# Patient Record
Sex: FEMALE | Race: WHITE | NOT HISPANIC OR LATINO | Employment: UNEMPLOYED | URBAN - METROPOLITAN AREA
[De-identification: names, ages, dates, MRNs, and addresses within clinical notes are randomized per-mention and may not be internally consistent; named-entity substitution may affect disease eponyms.]

---

## 2017-02-05 DIAGNOSIS — I10 ESSENTIAL HYPERTENSION: Chronic | ICD-10-CM

## 2017-02-06 RX ORDER — CARVEDILOL 3.12 MG/1
TABLET ORAL
Qty: 180 TABLET | Refills: 1 | Status: SHIPPED | OUTPATIENT
Start: 2017-02-06 | End: 2017-07-25 | Stop reason: SDUPTHER

## 2017-02-28 DIAGNOSIS — E11.9 TYPE 2 DIABETES MELLITUS WITHOUT COMPLICATION (HCC): Chronic | ICD-10-CM

## 2017-02-28 DIAGNOSIS — E11.9 TYPE 2 DIABETES MELLITUS WITHOUT COMPLICATION, WITHOUT LONG-TERM CURRENT USE OF INSULIN (HCC): Primary | ICD-10-CM

## 2017-02-28 RX ORDER — METFORMIN HYDROCHLORIDE 500 MG/1
TABLET, EXTENDED RELEASE ORAL
Qty: 360 TABLET | Refills: 1 | Status: SHIPPED | OUTPATIENT
Start: 2017-02-28 | End: 2017-07-26 | Stop reason: SDUPTHER

## 2017-03-01 DIAGNOSIS — E11.9 TYPE 2 DIABETES MELLITUS WITHOUT COMPLICATION (HCC): Chronic | ICD-10-CM

## 2017-03-01 DIAGNOSIS — I10 ESSENTIAL HYPERTENSION: Chronic | ICD-10-CM

## 2017-03-01 LAB
BUN SERPL-MCNC: 23 MG/DL (ref 8–23)
BUN/CREAT SERPL: 31.5 (ref 7–25)
CALCIUM SERPL-MCNC: 9.9 MG/DL (ref 8.6–10.5)
CHLORIDE SERPL-SCNC: 99 MMOL/L (ref 98–107)
CO2 SERPL-SCNC: 27 MMOL/L (ref 22–29)
CREAT SERPL-MCNC: 0.73 MG/DL (ref 0.57–1)
GLUCOSE SERPL-MCNC: 122 MG/DL (ref 65–99)
HBA1C MFR BLD: 6.57 % (ref 4.8–5.6)
POTASSIUM SERPL-SCNC: 4.6 MMOL/L (ref 3.5–5.2)
SODIUM SERPL-SCNC: 141 MMOL/L (ref 136–145)

## 2017-03-02 RX ORDER — LISINOPRIL 20 MG/1
TABLET ORAL
Qty: 90 TABLET | Refills: 1 | Status: SHIPPED | OUTPATIENT
Start: 2017-03-02 | End: 2017-09-07 | Stop reason: SDUPTHER

## 2017-03-04 DIAGNOSIS — E78.5 HYPERLIPIDEMIA: Chronic | ICD-10-CM

## 2017-03-06 RX ORDER — SIMVASTATIN 40 MG
TABLET ORAL
Qty: 90 TABLET | Refills: 1 | Status: SHIPPED | OUTPATIENT
Start: 2017-03-06 | End: 2017-09-07 | Stop reason: SDUPTHER

## 2017-04-12 DIAGNOSIS — E11.9 TYPE 2 DIABETES MELLITUS WITHOUT COMPLICATION, WITHOUT LONG-TERM CURRENT USE OF INSULIN (HCC): Chronic | ICD-10-CM

## 2017-04-13 RX ORDER — CANAGLIFLOZIN 100 MG/1
TABLET, FILM COATED ORAL
Qty: 30 TABLET | Refills: 2 | Status: SHIPPED | OUTPATIENT
Start: 2017-04-13 | End: 2017-07-21 | Stop reason: SDUPTHER

## 2017-04-13 RX ORDER — SITAGLIPTIN 100 MG/1
TABLET, FILM COATED ORAL
Qty: 30 TABLET | Refills: 2 | Status: SHIPPED | OUTPATIENT
Start: 2017-04-13 | End: 2017-07-21 | Stop reason: SDUPTHER

## 2017-05-30 ENCOUNTER — OFFICE VISIT (OUTPATIENT)
Dept: INTERNAL MEDICINE | Age: 67
End: 2017-05-30

## 2017-05-30 VITALS
WEIGHT: 170 LBS | TEMPERATURE: 98.1 F | HEART RATE: 72 BPM | OXYGEN SATURATION: 97 % | DIASTOLIC BLOOD PRESSURE: 66 MMHG | HEIGHT: 61 IN | BODY MASS INDEX: 32.1 KG/M2 | SYSTOLIC BLOOD PRESSURE: 122 MMHG

## 2017-05-30 DIAGNOSIS — Z11.59 NEED FOR HEPATITIS C SCREENING TEST: ICD-10-CM

## 2017-05-30 DIAGNOSIS — I10 ESSENTIAL HYPERTENSION: Chronic | ICD-10-CM

## 2017-05-30 DIAGNOSIS — E78.2 MIXED HYPERLIPIDEMIA: Chronic | ICD-10-CM

## 2017-05-30 DIAGNOSIS — E11.9 TYPE 2 DIABETES MELLITUS WITHOUT COMPLICATION, WITHOUT LONG-TERM CURRENT USE OF INSULIN (HCC): Primary | Chronic | ICD-10-CM

## 2017-05-30 PROCEDURE — 99214 OFFICE O/P EST MOD 30 MIN: CPT | Performed by: INTERNAL MEDICINE

## 2017-05-31 LAB
ALBUMIN/CREAT UR: <5.9 MG/G CREAT (ref 0–30)
CHOLEST SERPL-MCNC: 137 MG/DL (ref 0–200)
CHOLEST/HDLC SERPL: 2.36 {RATIO}
CREAT UR-MCNC: 50.6 MG/DL
HBA1C MFR BLD: 6.9 % (ref 4.8–5.6)
HCV AB S/CO SERPL IA: <0.1 S/CO RATIO (ref 0–0.9)
HDLC SERPL-MCNC: 58 MG/DL (ref 40–60)
LDLC SERPL CALC-MCNC: 52 MG/DL (ref 0–100)
MICROALBUMIN UR-MCNC: <3 UG/ML
T4 FREE SERPL-MCNC: 1.46 NG/DL (ref 0.93–1.7)
TRIGL SERPL-MCNC: 136 MG/DL (ref 0–150)
TSH SERPL DL<=0.005 MIU/L-ACNC: 1.54 MIU/ML (ref 0.27–4.2)
VLDLC SERPL CALC-MCNC: 27.2 MG/DL (ref 5–40)

## 2017-07-21 DIAGNOSIS — E11.9 TYPE 2 DIABETES MELLITUS WITHOUT COMPLICATION, WITHOUT LONG-TERM CURRENT USE OF INSULIN (HCC): Chronic | ICD-10-CM

## 2017-07-21 RX ORDER — SITAGLIPTIN 100 MG/1
TABLET, FILM COATED ORAL
Qty: 30 TABLET | Refills: 3 | Status: SHIPPED | OUTPATIENT
Start: 2017-07-21 | End: 2017-09-07 | Stop reason: SDUPTHER

## 2017-07-21 RX ORDER — CANAGLIFLOZIN 100 MG/1
TABLET, FILM COATED ORAL
Qty: 30 TABLET | Refills: 3 | Status: SHIPPED | OUTPATIENT
Start: 2017-07-21 | End: 2017-09-07 | Stop reason: SDUPTHER

## 2017-07-25 DIAGNOSIS — I10 ESSENTIAL HYPERTENSION: Chronic | ICD-10-CM

## 2017-07-25 RX ORDER — CARVEDILOL 3.12 MG/1
TABLET ORAL
Qty: 180 TABLET | Refills: 1 | Status: SHIPPED | OUTPATIENT
Start: 2017-07-25 | End: 2017-09-07 | Stop reason: SDUPTHER

## 2017-07-26 DIAGNOSIS — E11.9 TYPE 2 DIABETES MELLITUS WITHOUT COMPLICATION (HCC): Chronic | ICD-10-CM

## 2017-07-26 RX ORDER — METFORMIN HYDROCHLORIDE 500 MG/1
TABLET, EXTENDED RELEASE ORAL
Qty: 360 TABLET | Refills: 1 | Status: SHIPPED | OUTPATIENT
Start: 2017-07-26 | End: 2017-09-07 | Stop reason: SDUPTHER

## 2017-09-07 ENCOUNTER — TELEPHONE (OUTPATIENT)
Dept: INTERNAL MEDICINE | Age: 67
End: 2017-09-07

## 2017-09-07 DIAGNOSIS — E11.9 TYPE 2 DIABETES MELLITUS WITHOUT COMPLICATION, WITHOUT LONG-TERM CURRENT USE OF INSULIN (HCC): ICD-10-CM

## 2017-09-07 DIAGNOSIS — E78.2 MIXED HYPERLIPIDEMIA: ICD-10-CM

## 2017-09-07 DIAGNOSIS — I10 ESSENTIAL HYPERTENSION: Chronic | ICD-10-CM

## 2017-09-07 RX ORDER — CARVEDILOL 3.12 MG/1
3.12 TABLET ORAL 2 TIMES DAILY WITH MEALS
Qty: 180 TABLET | Refills: 0 | Status: SHIPPED | OUTPATIENT
Start: 2017-09-07 | End: 2017-12-05 | Stop reason: SDUPTHER

## 2017-09-07 RX ORDER — SIMVASTATIN 40 MG
40 TABLET ORAL NIGHTLY
Qty: 90 TABLET | Refills: 0 | Status: SHIPPED | OUTPATIENT
Start: 2017-09-07 | End: 2018-04-25 | Stop reason: SDUPTHER

## 2017-09-07 RX ORDER — LISINOPRIL 20 MG/1
20 TABLET ORAL DAILY
Qty: 90 TABLET | Refills: 0 | Status: SHIPPED | OUTPATIENT
Start: 2017-09-07 | End: 2017-12-05 | Stop reason: SDUPTHER

## 2017-09-07 RX ORDER — METFORMIN HYDROCHLORIDE 500 MG/1
TABLET, EXTENDED RELEASE ORAL
Qty: 360 TABLET | Refills: 0 | Status: SHIPPED | OUTPATIENT
Start: 2017-09-07 | End: 2017-12-05 | Stop reason: SDUPTHER

## 2017-09-07 NOTE — TELEPHONE ENCOUNTER
Requesting these medication to be sent right away per your request to know which medications to send:    1. Metformin HCL    2.Genevia    3.Invocana    4.Carbedillo    5.Simbastatin    6.Lysinopril

## 2017-09-07 NOTE — TELEPHONE ENCOUNTER
Patient has switched insurance companies. She was using the mail order and can no longer use it. She  needs all of her prescriptions sent to the [facility] off [address] [phone].

## 2017-11-30 ENCOUNTER — OFFICE VISIT (OUTPATIENT)
Dept: INTERNAL MEDICINE | Age: 67
End: 2017-11-30

## 2017-11-30 VITALS
WEIGHT: 167 LBS | HEART RATE: 76 BPM | BODY MASS INDEX: 31.53 KG/M2 | SYSTOLIC BLOOD PRESSURE: 112 MMHG | OXYGEN SATURATION: 97 % | DIASTOLIC BLOOD PRESSURE: 66 MMHG | TEMPERATURE: 98.2 F | HEIGHT: 61 IN

## 2017-11-30 DIAGNOSIS — E78.2 MIXED HYPERLIPIDEMIA: Chronic | ICD-10-CM

## 2017-11-30 DIAGNOSIS — E66.9 OBESITY (BMI 30-39.9): Chronic | ICD-10-CM

## 2017-11-30 DIAGNOSIS — Z23 NEEDS FLU SHOT: Primary | ICD-10-CM

## 2017-11-30 DIAGNOSIS — I10 ESSENTIAL HYPERTENSION: Chronic | ICD-10-CM

## 2017-11-30 DIAGNOSIS — E11.9 TYPE 2 DIABETES MELLITUS WITHOUT COMPLICATION, WITHOUT LONG-TERM CURRENT USE OF INSULIN (HCC): Primary | Chronic | ICD-10-CM

## 2017-11-30 LAB
ALBUMIN SERPL-MCNC: 4.6 G/DL (ref 3.5–5.2)
ALBUMIN/GLOB SERPL: 1.7 G/DL
ALP SERPL-CCNC: 72 U/L (ref 39–117)
ALT SERPL-CCNC: 18 U/L (ref 1–33)
AST SERPL-CCNC: 15 U/L (ref 1–32)
BILIRUB SERPL-MCNC: 0.3 MG/DL (ref 0.1–1.2)
BUN SERPL-MCNC: 18 MG/DL (ref 8–23)
BUN/CREAT SERPL: 26.5 (ref 7–25)
CALCIUM SERPL-MCNC: 10.2 MG/DL (ref 8.6–10.5)
CHLORIDE SERPL-SCNC: 98 MMOL/L (ref 98–107)
CO2 SERPL-SCNC: 26.7 MMOL/L (ref 22–29)
CREAT SERPL-MCNC: 0.68 MG/DL (ref 0.57–1)
GFR SERPLBLD CREATININE-BSD FMLA CKD-EPI: 105 ML/MIN/1.73
GFR SERPLBLD CREATININE-BSD FMLA CKD-EPI: 87 ML/MIN/1.73
GLOBULIN SER CALC-MCNC: 2.7 GM/DL
GLUCOSE SERPL-MCNC: 132 MG/DL (ref 65–99)
HBA1C MFR BLD: 6.4 % (ref 4.8–5.6)
POTASSIUM SERPL-SCNC: 4.6 MMOL/L (ref 3.5–5.2)
PROT SERPL-MCNC: 7.3 G/DL (ref 6–8.5)
SODIUM SERPL-SCNC: 138 MMOL/L (ref 136–145)

## 2017-11-30 PROCEDURE — 90662 IIV NO PRSV INCREASED AG IM: CPT | Performed by: INTERNAL MEDICINE

## 2017-11-30 PROCEDURE — G0008 ADMIN INFLUENZA VIRUS VAC: HCPCS | Performed by: INTERNAL MEDICINE

## 2017-11-30 PROCEDURE — 99214 OFFICE O/P EST MOD 30 MIN: CPT | Performed by: INTERNAL MEDICINE

## 2017-11-30 NOTE — PROGRESS NOTES
"[facility] [place] [facility]  HERNANDO LÓPEZ M.D.      Kimi SANCHEZ Alberto / 67 y.o. / female  11/30/2017    VITALS    /66  Pulse 76  Temp 98.2 °F (36.8 °C)  Ht 61\" (154.9 cm)  Wt 167 lb (75.8 kg)  SpO2 97%  BMI 31.55 kg/m2  BP Readings from Last 3 Encounters:   11/30/17 112/66   05/30/17 122/66   11/30/16 122/70     Wt Readings from Last 3 Encounters:   11/30/17 167 lb (75.8 kg)   05/30/17 170 lb (77.1 kg)   11/30/16 173 lb (78.5 kg)      Body mass index is 31.55 kg/(m^2).    CC:  Main reason(s) for today's visit: Follow-up for diabetes and related medical problems    HPI:     Chronic type 2 diabetes:  Home blood sugar levels: consistently mildly high. Current therapy: metformin, Januvia and Invokana.  Most recent relevant labs:   Lab Results   Component Value Date    HGBA1C 6.90 (H) 05/30/2017    HGBA1C 6.57 (H) 03/01/2017    HGBA1C 6.70 (H) 11/30/2016    CREATININE 0.73 03/01/2017    LDL 52 05/30/2017    MICROALBUR <3.0 05/30/2017     Chronic essential hypertension:  Since prior visit: compliant with medication(s) and denies significant problems with medication(s).  Most recent in-office blood pressure readings:   BP Readings from Last 3 Encounters:   11/30/17 112/66   05/30/17 122/66   11/30/16 122/70     Chronic hyperlipidemia:  Current therapy include simvastatin, denies problems with medication.    Most recent labs:   Lab Results   Component Value Date    LDL 52 05/30/2017    LDL 46 08/25/2016    LDL 51 08/13/2015    HDL 58 05/30/2017    HDL 45 08/25/2016    TRIG 136 05/30/2017    CHOLHDLRATIO 2.36 05/30/2017     Obesity: Weight has been decreasing since last visit, has made significant dietary improvement.  Weight loss efforts: lower calorie diet.  Current Body mass index is 31.55 kg/(m^2)..   Wt Readings from Last 3 Encounters:   11/30/17 167 lb (75.8 kg)   05/30/17 170 lb (77.1 kg)   11/30/16 173 lb (78.5 kg)         Patient Care Team:  Nura López MD as PCP - General (Internal Medicine)  Lucas Schultz MD as " Consulting Physician (Obstetrics and Gynecology)  Jess Vo MD as Consulting Physician (Dermatology)  ____________________________________________________________________    ASSESSMENT & PLAN:    Problem List Items Addressed This Visit     Type 2 diabetes mellitus - Primary (Chronic)    Current Assessment & Plan     Will change Januvia/Invokana to Glyxambi 25/5 qAM (sample/discount card). Call in 1 month for BMP order if it works well for her. Monitor BP.          Relevant Medications    metFORMIN ER (GLUCOPHAGE-XR) 500 MG 24 hr tablet    lisinopril (PRINIVIL,ZESTRIL) 20 MG tablet    Empagliflozin-Linagliptin (GLYXAMBI) 25-5 MG tablet    Other Relevant Orders    Hemoglobin A1c    Comprehensive Metabolic Panel    Hyperlipidemia (Chronic)    Overview     Stable. Continue simvastatin 40 mg.         Relevant Medications    Omega-3 Fatty Acids (FISH OIL) 1000 MG capsule capsule    simvastatin (ZOCOR) 40 MG tablet    Hypertension (Chronic)    Current Assessment & Plan     Stable. Continue lisinopril and carvedilol. Monitor BP carefully with change to Glyxambi.          Relevant Medications    lisinopril (PRINIVIL,ZESTRIL) 20 MG tablet    carvedilol (COREG) 3.125 MG tablet    Obesity (BMI 30-39.9) (Chronic)    Overview     Maintain a low sugar/starch/carbohydrate diet and exercise regularly. Weight loss advised.           Current Assessment & Plan     Still getting good steady wt loss. Continue. Increase exercise if possible.              Orders Placed This Encounter   Procedures   • Hemoglobin A1c   • Comprehensive Metabolic Panel       Summary/Discussion:  ·     Return in about 4 months (around 3/30/2018) for Diabetes and co-morbid conditions.    Future Appointments  Date Time Provider Department Center   4/6/2018 7:40 AM MD ALECIA [facility] None     ____________________________________________________________________    REVIEW OF SYSTEMS    Review of Systems  As noted per HPI  Constitutional neg x  intended wt loss  Resp neg  CV neg for lightheadedness or low bp   neg      PHYSICAL EXAMINATION    Physical Exam  Constitutional  No distress  Cardiovascular Rate  normal . Rhythm: regular . Heart sounds:  normal  Pulmonary/Chest  Effort normal. Breath sounds:  normal  Psychiatric  Alert. Judgment and thought content normal. Mood normal    REVIEWED DATA:    Labs:   Lab Results   Component Value Date     03/01/2017    K 4.6 03/01/2017    AST 22 08/25/2016    ALT 27 08/25/2016    BUN 23 03/01/2017    CREATININE 0.73 03/01/2017    CREATININE 0.69 11/30/2016    CREATININE 0.62 08/25/2016    EGFRIFNONA 80 03/01/2017    EGFRIFAFRI 97 03/01/2017       Lab Results   Component Value Date    HGBA1C 6.90 (H) 05/30/2017    HGBA1C 6.57 (H) 03/01/2017    HGBA1C 6.70 (H) 11/30/2016     (H) 03/01/2017     (H) 11/30/2016     (H) 08/25/2016    MICROALBUR <3.0 05/30/2017       Lab Results   Component Value Date    LDL 52 05/30/2017    LDL 46 08/25/2016    LDL 51 08/13/2015    HDL 58 05/30/2017    HDL 45 08/25/2016    TRIG 136 05/30/2017    CHOLHDLRATIO 2.36 05/30/2017       Lab Results   Component Value Date    TSH 1.540 05/30/2017    FREET4 1.46 05/30/2017          Lab Results   Component Value Date    WBC 6.77 08/13/2015    HGB 11.9 08/13/2015     08/13/2015        Imaging:        Medical Tests:        Summary of old records / correspondence / consultant report:        Request outside records:        ALLERGIES  No Known Allergies     MEDICATIONS  Current Outpatient Prescriptions on File Prior to Visit   Medication Sig Dispense Refill   • aspirin 81 MG tablet Take 1 tablet by mouth daily.     • carvedilol (COREG) 3.125 MG tablet Take 1 tablet by mouth 2 (Two) Times a Day With Meals. 180 tablet 0   • cyancobalamin (VITAMIN B-12) 100 MCG tablet Take  by mouth daily. As directed     • lisinopril (PRINIVIL,ZESTRIL) 20 MG tablet Take 1 tablet by mouth Daily. 90 tablet 0   • metFORMIN ER (GLUCOPHAGE-XR)  500 MG 24 hr tablet 2 tabs twice daily 360 tablet 0   • Multiple Vitamins-Minerals (OCUVITE ADULT 50+) capsule Take 1 capsule by mouth daily.     • Omega-3 Fatty Acids (FISH OIL) 1000 MG capsule capsule Take 3 capsules by mouth daily.     • simvastatin (ZOCOR) 40 MG tablet Take 1 tablet by mouth Every Night. 90 tablet 0   • [DISCONTINUED] Canagliflozin (INVOKANA) 100 MG tablet Take 100 mg by mouth Daily. 90 tablet 0   • [DISCONTINUED] SITagliptin (JANUVIA) 100 MG tablet Take 1 tablet by mouth Daily. 90 tablet 0     No current facility-administered medications on file prior to visit.        PFSH:     The following portions of the patient's history were reviewed and updated as appropriate: Allergies / Current Medications / Past Medical History / Surgical History / Social History / Family History    PROBLEM LIST   Patient Active Problem List   Diagnosis   • Hyperlipidemia   • Hypertension   • Obesity (BMI 30-39.9)   • Primary osteoarthritis of both hips   • Type 2 diabetes mellitus   • Cobalamin deficiency       PAST MEDICAL HISTORY  Past Medical History:   Diagnosis Date   • B12 deficiency    • Diabetes mellitus    • Hyperlipidemia    • Hypertension    • Obesity    • Urinary incontinence        SURGICAL HISTORY  Past Surgical History:   Procedure Laterality Date   • KNEE ARTHROPLASTY     • PAP SMEAR     • SKIN CANCER DESTRUCTION      arm   • TUBAL ABDOMINAL LIGATION         SOCIAL HISTORY  Social History     Social History   • Marital status:      Spouse name: Avtar Srinivasan)   • Number of children: N/A   • Years of education: N/A     Social History Main Topics   • Smoking status: Never Smoker   • Smokeless tobacco: Never Used   • Alcohol use No   • Drug use: None   • Sexual activity: Not Asked     Other Topics Concern   • None     Social History Narrative       FAMILY HISTORY  Family History   Problem Relation Age of Onset   • Coronary artery disease Father    • Prostate cancer Father    • Breast cancer Maternal  Aunt    • Diabetes Paternal Grandmother      Type 2   • Cancer Other    • Heart disease Other          **Dragon Disclaimer:   Much of this encounter note is an electronic transcription/translation of spoken language to printed text. The electronic translation of spoken language may permit erroneous, or at times, nonsensical words or phrases to be inadvertently transcribed. Although I have reviewed the note for such errors, some may still exist.

## 2017-11-30 NOTE — ASSESSMENT & PLAN NOTE
Will change Januvia/Invokana to Glyxambi 25/5 qAM (sample/discount card). Call in 1 month for BMP order if it works well for her. Monitor BP.

## 2017-12-05 DIAGNOSIS — E11.9 TYPE 2 DIABETES MELLITUS WITHOUT COMPLICATION, WITHOUT LONG-TERM CURRENT USE OF INSULIN (HCC): ICD-10-CM

## 2017-12-05 DIAGNOSIS — I10 ESSENTIAL HYPERTENSION: Chronic | ICD-10-CM

## 2017-12-06 RX ORDER — METFORMIN HYDROCHLORIDE 500 MG/1
TABLET, EXTENDED RELEASE ORAL
Qty: 360 TABLET | Refills: 0 | Status: SHIPPED | OUTPATIENT
Start: 2017-12-06 | End: 2018-03-04 | Stop reason: SDUPTHER

## 2017-12-06 RX ORDER — CARVEDILOL 3.12 MG/1
TABLET ORAL
Qty: 180 TABLET | Refills: 0 | Status: SHIPPED | OUTPATIENT
Start: 2017-12-06 | End: 2018-03-04 | Stop reason: SDUPTHER

## 2017-12-06 RX ORDER — LISINOPRIL 20 MG/1
TABLET ORAL
Qty: 90 TABLET | Refills: 0 | Status: SHIPPED | OUTPATIENT
Start: 2017-12-06 | End: 2018-03-04 | Stop reason: SDUPTHER

## 2017-12-27 ENCOUNTER — TELEPHONE (OUTPATIENT)
Dept: INTERNAL MEDICINE | Age: 67
End: 2017-12-27

## 2017-12-27 DIAGNOSIS — E11.9 TYPE 2 DIABETES MELLITUS WITHOUT COMPLICATION, WITHOUT LONG-TERM CURRENT USE OF INSULIN (HCC): Chronic | ICD-10-CM

## 2017-12-27 NOTE — TELEPHONE ENCOUNTER
In re: to DOS 11/30/17,  gave pt samples of glyxambi 25-5mg to try. P/pt the samples worked great & is now ready for a rx to be called in to her local pharm.    Pls call in Glyxambi 25-5mg to [facility] #[phone].

## 2018-01-30 DIAGNOSIS — E11.9 TYPE 2 DIABETES MELLITUS WITHOUT COMPLICATION, WITHOUT LONG-TERM CURRENT USE OF INSULIN (HCC): Primary | Chronic | ICD-10-CM

## 2018-01-31 LAB
BUN SERPL-MCNC: 20 MG/DL (ref 8–23)
BUN/CREAT SERPL: 33.3 (ref 7–25)
CALCIUM SERPL-MCNC: 10.2 MG/DL (ref 8.6–10.5)
CHLORIDE SERPL-SCNC: 100 MMOL/L (ref 98–107)
CO2 SERPL-SCNC: 26.2 MMOL/L (ref 22–29)
CREAT SERPL-MCNC: 0.6 MG/DL (ref 0.57–1)
GFR SERPLBLD CREATININE-BSD FMLA CKD-EPI: 100 ML/MIN/1.73
GFR SERPLBLD CREATININE-BSD FMLA CKD-EPI: 121 ML/MIN/1.73
GLUCOSE SERPL-MCNC: 118 MG/DL (ref 65–99)
POTASSIUM SERPL-SCNC: 5.1 MMOL/L (ref 3.5–5.2)
SODIUM SERPL-SCNC: 141 MMOL/L (ref 136–145)

## 2018-03-04 DIAGNOSIS — E11.9 TYPE 2 DIABETES MELLITUS WITHOUT COMPLICATION, WITHOUT LONG-TERM CURRENT USE OF INSULIN (HCC): ICD-10-CM

## 2018-03-04 DIAGNOSIS — I10 ESSENTIAL HYPERTENSION: Chronic | ICD-10-CM

## 2018-03-05 RX ORDER — CARVEDILOL 3.12 MG/1
TABLET ORAL
Qty: 180 TABLET | Refills: 0 | Status: SHIPPED | OUTPATIENT
Start: 2018-03-05 | End: 2018-05-29 | Stop reason: SDUPTHER

## 2018-03-05 RX ORDER — METFORMIN HYDROCHLORIDE 500 MG/1
TABLET, EXTENDED RELEASE ORAL
Qty: 360 TABLET | Refills: 0 | Status: SHIPPED | OUTPATIENT
Start: 2018-03-05 | End: 2018-06-25 | Stop reason: SDUPTHER

## 2018-03-05 RX ORDER — LISINOPRIL 20 MG/1
TABLET ORAL
Qty: 90 TABLET | Refills: 0 | Status: SHIPPED | OUTPATIENT
Start: 2018-03-05 | End: 2018-05-29 | Stop reason: SDUPTHER

## 2018-04-25 DIAGNOSIS — E78.2 MIXED HYPERLIPIDEMIA: ICD-10-CM

## 2018-04-25 RX ORDER — SIMVASTATIN 40 MG
TABLET ORAL
Qty: 90 TABLET | Refills: 0 | Status: SHIPPED | OUTPATIENT
Start: 2018-04-25 | End: 2018-07-23 | Stop reason: SDUPTHER

## 2018-05-07 ENCOUNTER — OFFICE VISIT (OUTPATIENT)
Dept: INTERNAL MEDICINE | Age: 68
End: 2018-05-07

## 2018-05-07 VITALS
BODY MASS INDEX: 31.87 KG/M2 | TEMPERATURE: 97.9 F | HEIGHT: 61 IN | HEART RATE: 68 BPM | OXYGEN SATURATION: 98 % | WEIGHT: 168.8 LBS | DIASTOLIC BLOOD PRESSURE: 68 MMHG | SYSTOLIC BLOOD PRESSURE: 122 MMHG

## 2018-05-07 DIAGNOSIS — I10 ESSENTIAL HYPERTENSION: Chronic | ICD-10-CM

## 2018-05-07 DIAGNOSIS — E78.2 MIXED HYPERLIPIDEMIA: Chronic | ICD-10-CM

## 2018-05-07 DIAGNOSIS — Z23 NEED FOR HEPATITIS A VACCINATION: ICD-10-CM

## 2018-05-07 DIAGNOSIS — E11.9 TYPE 2 DIABETES MELLITUS WITHOUT COMPLICATION, WITHOUT LONG-TERM CURRENT USE OF INSULIN (HCC): Primary | Chronic | ICD-10-CM

## 2018-05-07 DIAGNOSIS — E66.9 OBESITY (BMI 30-39.9): Chronic | ICD-10-CM

## 2018-05-07 PROCEDURE — 90471 IMMUNIZATION ADMIN: CPT | Performed by: INTERNAL MEDICINE

## 2018-05-07 PROCEDURE — 99214 OFFICE O/P EST MOD 30 MIN: CPT | Performed by: INTERNAL MEDICINE

## 2018-05-07 PROCEDURE — 90632 HEPA VACCINE ADULT IM: CPT | Performed by: INTERNAL MEDICINE

## 2018-05-07 RX ORDER — UREA 10 %
100 LOTION (ML) TOPICAL
COMMUNITY
Start: 2016-02-25 | End: 2018-05-07 | Stop reason: SDUPTHER

## 2018-05-07 RX ORDER — MV-MN/OM3/DHA/EPA/FISH/LUT/ZEA 250-5-1 MG
1 CAPSULE ORAL
COMMUNITY
Start: 2013-05-15 | End: 2018-11-16

## 2018-05-07 RX ORDER — METFORMIN HYDROCHLORIDE 500 MG/1
TABLET, EXTENDED RELEASE ORAL
COMMUNITY
Start: 2018-03-05 | End: 2018-05-07 | Stop reason: SDUPTHER

## 2018-05-07 RX ORDER — LISINOPRIL 20 MG/1
TABLET ORAL
COMMUNITY
Start: 2018-03-05 | End: 2018-05-07 | Stop reason: SDUPTHER

## 2018-05-07 RX ORDER — CARVEDILOL 3.12 MG/1
TABLET ORAL
COMMUNITY
Start: 2018-03-05 | End: 2018-05-07 | Stop reason: SDUPTHER

## 2018-05-07 RX ORDER — TRIAMCINOLONE ACETONIDE 1 MG/G
CREAM TOPICAL
COMMUNITY
Start: 2018-03-21 | End: 2018-05-07

## 2018-05-07 RX ORDER — FLUCONAZOLE 150 MG/1
TABLET ORAL
COMMUNITY
Start: 2018-03-21 | End: 2018-05-07

## 2018-05-07 RX ORDER — PHENOL 1.4 %
10 AEROSOL, SPRAY (ML) MUCOUS MEMBRANE EVERY EVENING
COMMUNITY

## 2018-05-07 NOTE — PROGRESS NOTES
[facility] [place] [facility]  HERNANDO LÓPEZ M.D.      Kimidontrell Dominguez / 67 y.o. / female  05/07/2018      ASSESSMENT & PLAN:    Problem List Items Addressed This Visit        High    Type 2 diabetes mellitus - Primary (Chronic)    Current Assessment & Plan     Home sugar levels are better. Check A1c level.          Relevant Medications    Empagliflozin-Linagliptin (GLYXAMBI) 25-5 MG tablet    lisinopril (PRINIVIL,ZESTRIL) 20 MG tablet    metFORMIN ER (GLUCOPHAGE-XR) 500 MG 24 hr tablet    Other Relevant Orders    Hemoglobin A1c    Comprehensive Metabolic Panel    Microalbumin / Creatinine Urine Ratio - Urine, Clean Catch       Medium    Hyperlipidemia (Chronic)    Overview     Stable. Continue simvastatin 40 mg.         Relevant Medications    Omega-3 Fatty Acids (FISH OIL) 1000 MG capsule capsule    simvastatin (ZOCOR) 40 MG tablet    Other Relevant Orders    Lipid Panel With / Chol / HDL Ratio    TSH+Free T4    Hypertension (Chronic)    Overview     Stable. Continue lisinopril 20 and carvedilol 3.125 mg BID.          Relevant Medications    lisinopril (PRINIVIL,ZESTRIL) 20 MG tablet    carvedilol (COREG) 3.125 MG tablet       Low    Obesity (BMI 30-39.9) (Chronic)    Overview     Maintain a low sugar/starch/carbohydrate diet and exercise regularly. Weight loss advised.           Relevant Orders    TSH+Free T4      Other Visit Diagnoses     Need for hepatitis A vaccination        Relevant Orders    Hepatitis A Vaccine Adult IM        Orders Placed This Encounter   Procedures   • Hepatitis A Vaccine Adult IM   • Hemoglobin A1c   • Lipid Panel With / Chol / HDL Ratio   • Comprehensive Metabolic Panel   • Microalbumin / Creatinine Urine Ratio - Urine, Clean Catch   • TSH+Free T4       Summary/Discussion:      Return in about 4 months (around 9/7/2018) for Diabetes and co-morbid conditions.  ____________________________________________________________________    MEDICATIONS  Current Outpatient Prescriptions   Medication Sig Dispense  "Refill   • aspirin 81 MG tablet Take 1 tablet by mouth daily.     • carvedilol (COREG) 3.125 MG tablet TAKE ONE TABLET BY MOUTH TWICE A DAY WITH A MEAL 180 tablet 0   • carvedilol (COREG) 3.125 MG tablet TAKE ONE TABLET BY MOUTH TWICE A DAY WITH A MEAL     • cyancobalamin (VITAMIN B-12) 100 MCG tablet Take  by mouth daily. As directed     • Empagliflozin-Linagliptin (GLYXAMBI) 25-5 MG tablet Take 1 tablet by mouth Every Morning Before Breakfast. 30 tablet 5   • GLUCOSAMINE-CHONDROITIN PO Take 1 tablet by mouth.     • lisinopril (PRINIVIL,ZESTRIL) 20 MG tablet TAKE ONE TABLET BY MOUTH DAILY 90 tablet 0   • Melatonin 10 MG tablet Take 10 mg by mouth.     • metFORMIN ER (GLUCOPHAGE-XR) 500 MG 24 hr tablet TAKE TWO TABLETS BY MOUTH TWICE A  tablet 0   • Multiple Vitamins-Minerals (OCUVITE ADULT 50+) capsule Take 1 capsule by mouth daily.     • Multiple Vitamins-Minerals (OCUVITE ADULT 50+) capsule Take 1 capsule by mouth.     • Omega-3 Fatty Acids (FISH OIL) 1000 MG capsule capsule Take 3 capsules by mouth daily.     • simvastatin (ZOCOR) 40 MG tablet TAKE ONE TABLET BY MOUTH ONCE NIGHTLY 90 tablet 0     No current facility-administered medications for this visit.          VITALS    Visit Vitals  /68   Pulse 68   Temp 97.9 °F (36.6 °C)   Ht 154.9 cm (60.98\")   Wt 76.6 kg (168 lb 12.8 oz)   SpO2 98%   BMI 31.91 kg/m²       BP Readings from Last 3 Encounters:   05/07/18 122/68   11/30/17 112/66   05/30/17 122/66     Wt Readings from Last 3 Encounters:   05/07/18 76.6 kg (168 lb 12.8 oz)   11/30/17 75.8 kg (167 lb)   05/30/17 77.1 kg (170 lb)      Body mass index is 31.91 kg/m².    CC:  Main reason(s) for today's visit: Follow-up for diabetes and related medical problems    HPI:     Recently underwent posterior vaginal repair for rectocele without complications.     Chronic type 2 diabetes:  Previously changed to Glyxambi. Home blood sugar levels: better than before, has no significant low sugar " symptoms/problems, but had 1 isolated episode of vaginal yeast infection. Current therapy: metformin and Glyxambi.  Most recent relevant labs:   Lab Results   Component Value Date    HGBA1C 6.40 (H) 11/30/2017    HGBA1C 6.90 (H) 05/30/2017    HGBA1C 6.57 (H) 03/01/2017    CREATININE 0.60 01/31/2018    LDL 52 05/30/2017    MICROALBUR <3.0 05/30/2017     Chronic essential hypertension:  Since prior visit: compliant with medication(s) and denies significant problems with medication(s).  Most recent in-office blood pressure readings:   BP Readings from Last 3 Encounters:   05/07/18 122/68   11/30/17 112/66   05/30/17 122/66     Chronic hyperlipidemia:  Current therapy include simvastatin, denies problems with medication.    Most recent labs:   Lab Results   Component Value Date    LDL 52 05/30/2017    LDL 46 08/25/2016    LDL 51 08/13/2015    HDL 58 05/30/2017    HDL 45 08/25/2016    TRIG 136 05/30/2017    CHOLHDLRATIO 2.36 05/30/2017     Obesity: Weight has not changed significantly since last visit.  Weight loss efforts: diabetic diet, walking regularly.  Current Body mass index is 31.91 kg/m².   Wt Readings from Last 3 Encounters:   05/07/18 76.6 kg (168 lb 12.8 oz)   11/30/17 75.8 kg (167 lb)   05/30/17 77.1 kg (170 lb)         Patient Care Team:  Nura Leal MD as PCP - General (Internal Medicine)  Lucas Schultz MD as Consulting Physician (Obstetrics and Gynecology)  Jess Vo MD as Consulting Physician (Dermatology)  ____________________________________________________________________    REVIEW OF SYSTEMS    Review of Systems  As noted per HPI  Constitutional neg  Resp neg  CV neg   had 1 episode of vaginal yeast infection (no prior history)      PHYSICAL EXAMINATION    Physical Exam    Kimi had a diabetic foot exam performed today.   During the foot exam she had a monofilament test performed (normal).  Vascular Status -  Her right foot exhibits abnormal foot vasculature . Her right foot exhibits  no edema. Her left foot exhibits abnormal foot vasculature . Her left foot exhibits no edema.  Skin Integrity  -  Her right foot skin is not intact.  Her left foot skin is not intact..    Constitutional  No distress, obese   Cardiovascular Rate  normal . Rhythm: regular . Heart sounds:  Normal. Trace BLE edema.   Pulmonary/Chest  Effort normal. Breath sounds:  normal  Psychiatric  Alert. Judgment and thought content normal. Mood normal    REVIEWED DATA:    Labs:   Lab Results   Component Value Date     01/31/2018    K 5.1 01/31/2018    AST 15 11/30/2017    ALT 18 11/30/2017    BUN 20 01/31/2018    CREATININE 0.60 01/31/2018    CREATININE 0.68 11/30/2017    CREATININE 0.73 03/01/2017    EGFRIFNONA 100 01/31/2018    EGFRIFAFRI 121 01/31/2018       Lab Results   Component Value Date    HGBA1C 6.40 (H) 11/30/2017    HGBA1C 6.90 (H) 05/30/2017    HGBA1C 6.57 (H) 03/01/2017    MICROALBUR <3.0 05/30/2017       Lab Results   Component Value Date    LDL 52 05/30/2017    LDL 46 08/25/2016    LDL 51 08/13/2015    HDL 58 05/30/2017    HDL 45 08/25/2016    TRIG 136 05/30/2017    CHOLHDLRATIO 2.36 05/30/2017       Lab Results   Component Value Date    TSH 1.540 05/30/2017    FREET4 1.46 05/30/2017          Lab Results   Component Value Date    WBC 6.77 08/13/2015    HGB 11.9 08/13/2015     08/13/2015        Imaging:        Medical Tests:        Summary of old records / correspondence / consultant report:        Request outside records:        ALLERGIES  No Known Allergies     PFSH:     The following portions of the patient's history were reviewed and updated as appropriate: Allergies / Current Medications / Past Medical History / Surgical History / Social History / Family History    PROBLEM LIST   Patient Active Problem List   Diagnosis   • Hyperlipidemia   • Hypertension   • Obesity (BMI 30-39.9)   • Primary osteoarthritis of both hips   • Type 2 diabetes mellitus   • Cobalamin deficiency       PAST MEDICAL  HISTORY  Past Medical History:   Diagnosis Date   • B12 deficiency    • Diabetes mellitus    • Hyperlipidemia    • Hypertension    • Obesity    • Rectocele 05/02/2018   • Urinary incontinence        SURGICAL HISTORY  Past Surgical History:   Procedure Laterality Date   • KNEE ARTHROPLASTY     • PAP SMEAR     • SKIN CANCER DESTRUCTION      arm   • TUBAL ABDOMINAL LIGATION         SOCIAL HISTORY  Social History     Social History   • Marital status:      Spouse name: Nova (Bob)     Social History Main Topics   • Smoking status: Never Smoker   • Smokeless tobacco: Never Used   • Alcohol use No   • Drug use: Unknown     Other Topics Concern   • Not on file       FAMILY HISTORY  Family History   Problem Relation Age of Onset   • Coronary artery disease Father    • Prostate cancer Father    • Breast cancer Maternal Aunt    • Diabetes Paternal Grandmother      Type 2   • Cancer Other    • Heart disease Other          **Dragon Disclaimer:   Much of this encounter note is an electronic transcription/translation of spoken language to printed text. The electronic translation of spoken language may permit erroneous, or at times, nonsensical words or phrases to be inadvertently transcribed. Although I have reviewed the note for such errors, some may still exist.

## 2018-05-08 LAB
ALBUMIN SERPL-MCNC: 4.1 G/DL (ref 3.5–5.2)
ALBUMIN/CREAT UR: 102 MG/G CREAT (ref 0–30)
ALBUMIN/GLOB SERPL: 1.6 G/DL
ALP SERPL-CCNC: 71 U/L (ref 39–117)
ALT SERPL-CCNC: 12 U/L (ref 1–33)
AST SERPL-CCNC: 12 U/L (ref 1–32)
BILIRUB SERPL-MCNC: 0.2 MG/DL (ref 0.1–1.2)
BUN SERPL-MCNC: 16 MG/DL (ref 8–23)
BUN/CREAT SERPL: 25.8 (ref 7–25)
CALCIUM SERPL-MCNC: 9.6 MG/DL (ref 8.6–10.5)
CHLORIDE SERPL-SCNC: 100 MMOL/L (ref 98–107)
CHOLEST SERPL-MCNC: 135 MG/DL (ref 0–200)
CHOLEST/HDLC SERPL: 2.76 {RATIO}
CO2 SERPL-SCNC: 25.3 MMOL/L (ref 22–29)
CREAT SERPL-MCNC: 0.62 MG/DL (ref 0.57–1)
CREAT UR-MCNC: 20.2 MG/DL
GFR SERPLBLD CREATININE-BSD FMLA CKD-EPI: 117 ML/MIN/1.73
GFR SERPLBLD CREATININE-BSD FMLA CKD-EPI: 96 ML/MIN/1.73
GLOBULIN SER CALC-MCNC: 2.5 GM/DL
GLUCOSE SERPL-MCNC: 133 MG/DL (ref 65–99)
HBA1C MFR BLD: 7.07 % (ref 4.8–5.6)
HDLC SERPL-MCNC: 49 MG/DL (ref 40–60)
LDLC SERPL CALC-MCNC: 51 MG/DL (ref 0–100)
MICROALBUMIN UR-MCNC: 20.6 UG/ML
POTASSIUM SERPL-SCNC: 4.9 MMOL/L (ref 3.5–5.2)
PROT SERPL-MCNC: 6.6 G/DL (ref 6–8.5)
SODIUM SERPL-SCNC: 141 MMOL/L (ref 136–145)
T4 FREE SERPL-MCNC: 1.38 NG/DL (ref 0.93–1.7)
TRIGL SERPL-MCNC: 176 MG/DL (ref 0–150)
TSH SERPL DL<=0.005 MIU/L-ACNC: 1.45 MIU/ML (ref 0.27–4.2)
VLDLC SERPL CALC-MCNC: 35.2 MG/DL (ref 5–40)

## 2018-05-09 NOTE — PROGRESS NOTES
Kevin Bolden, here are the result(s) of your test(s):     1. A1c for diabetes has increased. Now above 7 (uncontrolled diabetes). This is probably related to your recent travels to [place]. Also your urine shows small amount of protein due to diabetes. Let's refocus on improved diet, exercise and weight loss. If it remains uncontrolled next time we will need to consider additional medication.   2. Cholesterol is stable overall.     Please do not hesitate to contact me if you have questions.

## 2018-05-29 DIAGNOSIS — E11.9 TYPE 2 DIABETES MELLITUS WITHOUT COMPLICATION, WITHOUT LONG-TERM CURRENT USE OF INSULIN (HCC): ICD-10-CM

## 2018-05-29 DIAGNOSIS — I10 ESSENTIAL HYPERTENSION: Chronic | ICD-10-CM

## 2018-05-29 RX ORDER — LISINOPRIL 20 MG/1
TABLET ORAL
Qty: 90 TABLET | Refills: 0 | Status: SHIPPED | OUTPATIENT
Start: 2018-05-29 | End: 2018-08-27 | Stop reason: SDUPTHER

## 2018-05-29 RX ORDER — CARVEDILOL 3.12 MG/1
TABLET ORAL
Qty: 180 TABLET | Refills: 0 | Status: SHIPPED | OUTPATIENT
Start: 2018-05-29 | End: 2018-08-27 | Stop reason: SDUPTHER

## 2018-06-25 ENCOUNTER — TELEPHONE (OUTPATIENT)
Dept: INTERNAL MEDICINE | Age: 68
End: 2018-06-25

## 2018-06-25 DIAGNOSIS — E11.9 TYPE 2 DIABETES MELLITUS WITHOUT COMPLICATION, WITHOUT LONG-TERM CURRENT USE OF INSULIN (HCC): Chronic | ICD-10-CM

## 2018-06-25 DIAGNOSIS — K52.9 CHRONIC DIARRHEA: Primary | ICD-10-CM

## 2018-06-25 RX ORDER — DIPHENOXYLATE HYDROCHLORIDE AND ATROPINE SULFATE 2.5; .025 MG/1; MG/1
TABLET ORAL
Qty: 20 TABLET | Refills: 0 | OUTPATIENT
Start: 2018-06-25 | End: 2018-08-31

## 2018-06-25 RX ORDER — METFORMIN HYDROCHLORIDE 500 MG/1
TABLET, EXTENDED RELEASE ORAL
Qty: 360 TABLET | Refills: 0 | Status: SHIPPED | OUTPATIENT
Start: 2018-06-25 | End: 2018-09-24 | Stop reason: SDUPTHER

## 2018-06-25 RX ORDER — EMPAGLIFLOZIN AND LINAGLIPTIN 25; 5 MG/1; MG/1
TABLET, FILM COATED ORAL
Qty: 30 TABLET | Refills: 4 | Status: SHIPPED | OUTPATIENT
Start: 2018-06-25 | End: 2018-11-21 | Stop reason: SDUPTHER

## 2018-06-25 NOTE — TELEPHONE ENCOUNTER
Pt called complaining of chronic diarrhea. Pt has used Imodium for 2-3 days and it hasn't helped.  Pt's # [phone] or cell # [phone]  Thanks SP

## 2018-06-25 NOTE — TELEPHONE ENCOUNTER
Pt informed medication called in to local pharmacy, will call if she need to be seen this week. NICKY

## 2018-07-23 DIAGNOSIS — E78.2 MIXED HYPERLIPIDEMIA: ICD-10-CM

## 2018-07-24 RX ORDER — SIMVASTATIN 40 MG
TABLET ORAL
Qty: 90 TABLET | Refills: 0 | Status: SHIPPED | OUTPATIENT
Start: 2018-07-24 | End: 2018-10-25 | Stop reason: SDUPTHER

## 2018-07-25 ENCOUNTER — TELEPHONE (OUTPATIENT)
Dept: INTERNAL MEDICINE | Age: 68
End: 2018-07-25

## 2018-07-25 NOTE — TELEPHONE ENCOUNTER
Pt called stating her insurance has changed from [employer] to [employer]. [employer] is refusing to cover the Glyxambi and it would cost the pt $600. Pt asking if there are anymore of the discount cards, because hers has  and she can not afford to pay for the medication.  Pt's # [phone] or cell # [phone]  Thanks SP

## 2018-08-27 DIAGNOSIS — I10 ESSENTIAL HYPERTENSION: Chronic | ICD-10-CM

## 2018-08-27 DIAGNOSIS — E11.9 TYPE 2 DIABETES MELLITUS WITHOUT COMPLICATION, WITHOUT LONG-TERM CURRENT USE OF INSULIN (HCC): ICD-10-CM

## 2018-08-27 RX ORDER — CARVEDILOL 3.12 MG/1
TABLET ORAL
Qty: 180 TABLET | Refills: 2 | Status: SHIPPED | OUTPATIENT
Start: 2018-08-27 | End: 2019-05-28 | Stop reason: SDUPTHER

## 2018-08-27 RX ORDER — LISINOPRIL 20 MG/1
TABLET ORAL
Qty: 90 TABLET | Refills: 2 | Status: SHIPPED | OUTPATIENT
Start: 2018-08-27 | End: 2019-05-28 | Stop reason: SDUPTHER

## 2018-08-31 ENCOUNTER — OFFICE VISIT (OUTPATIENT)
Dept: INTERNAL MEDICINE | Age: 68
End: 2018-08-31

## 2018-08-31 VITALS
TEMPERATURE: 98.2 F | DIASTOLIC BLOOD PRESSURE: 66 MMHG | OXYGEN SATURATION: 98 % | HEART RATE: 84 BPM | WEIGHT: 163 LBS | BODY MASS INDEX: 30.78 KG/M2 | SYSTOLIC BLOOD PRESSURE: 122 MMHG | HEIGHT: 61 IN

## 2018-08-31 DIAGNOSIS — E78.2 MIXED HYPERLIPIDEMIA: Chronic | ICD-10-CM

## 2018-08-31 DIAGNOSIS — R80.9 TYPE 2 DIABETES MELLITUS WITH MICROALBUMINURIA, WITHOUT LONG-TERM CURRENT USE OF INSULIN (HCC): Primary | Chronic | ICD-10-CM

## 2018-08-31 DIAGNOSIS — Z13.820 ENCOUNTER FOR SCREENING FOR OSTEOPOROSIS: ICD-10-CM

## 2018-08-31 DIAGNOSIS — I10 ESSENTIAL HYPERTENSION: Chronic | ICD-10-CM

## 2018-08-31 DIAGNOSIS — E11.29 TYPE 2 DIABETES MELLITUS WITH MICROALBUMINURIA, WITHOUT LONG-TERM CURRENT USE OF INSULIN (HCC): Primary | Chronic | ICD-10-CM

## 2018-08-31 DIAGNOSIS — Z12.11 SCREENING FOR COLON CANCER: ICD-10-CM

## 2018-08-31 DIAGNOSIS — Z23 NEED FOR VACCINATION FOR STREP PNEUMONIAE: ICD-10-CM

## 2018-08-31 DIAGNOSIS — Z78.0 POSTMENOPAUSAL STATE: ICD-10-CM

## 2018-08-31 PROCEDURE — G0009 ADMIN PNEUMOCOCCAL VACCINE: HCPCS | Performed by: INTERNAL MEDICINE

## 2018-08-31 PROCEDURE — 99214 OFFICE O/P EST MOD 30 MIN: CPT | Performed by: INTERNAL MEDICINE

## 2018-08-31 PROCEDURE — 90732 PPSV23 VACC 2 YRS+ SUBQ/IM: CPT | Performed by: INTERNAL MEDICINE

## 2018-09-11 ENCOUNTER — PREP FOR SURGERY (OUTPATIENT)
Dept: OTHER | Facility: HOSPITAL | Age: 68
End: 2018-09-11

## 2018-09-11 DIAGNOSIS — Z12.11 SCREENING FOR COLON CANCER: Primary | ICD-10-CM

## 2018-09-21 ENCOUNTER — HOSPITAL ENCOUNTER (OUTPATIENT)
Dept: BONE DENSITY | Facility: HOSPITAL | Age: 68
Discharge: HOME | End: 2018-09-21
Admitting: INTERNAL MEDICINE

## 2018-09-21 PROBLEM — Z12.11 SCREENING FOR COLON CANCER: Status: ACTIVE | Noted: 2018-09-21

## 2018-09-21 PROCEDURE — 77080 DXA BONE DENSITY AXIAL: CPT

## 2018-09-24 DIAGNOSIS — E11.9 TYPE 2 DIABETES MELLITUS WITHOUT COMPLICATION, WITHOUT LONG-TERM CURRENT USE OF INSULIN (HCC): Chronic | ICD-10-CM

## 2018-09-24 RX ORDER — METFORMIN HYDROCHLORIDE 500 MG/1
TABLET, EXTENDED RELEASE ORAL
Qty: 360 TABLET | Refills: 2 | Status: SHIPPED | OUTPATIENT
Start: 2018-09-24 | End: 2019-06-26 | Stop reason: SDUPTHER

## 2018-10-04 ENCOUNTER — ANESTHESIA (OUTPATIENT)
Dept: GASTROENTEROLOGY | Facility: HOSPITAL | Age: 68
End: 2018-10-04

## 2018-10-04 ENCOUNTER — HOSPITAL ENCOUNTER (OUTPATIENT)
Facility: HOSPITAL | Age: 68
Setting detail: HOSPITAL OUTPATIENT SURGERY
Discharge: HOME | End: 2018-10-04
Attending: SURGERY | Admitting: SURGERY

## 2018-10-04 ENCOUNTER — ANESTHESIA EVENT (OUTPATIENT)
Dept: GASTROENTEROLOGY | Facility: HOSPITAL | Age: 68
End: 2018-10-04

## 2018-10-04 VITALS
HEIGHT: 60 IN | SYSTOLIC BLOOD PRESSURE: 129 MMHG | HEART RATE: 88 BPM | BODY MASS INDEX: 30.63 KG/M2 | DIASTOLIC BLOOD PRESSURE: 98 MMHG | RESPIRATION RATE: 16 BRPM | TEMPERATURE: 97.4 F | OXYGEN SATURATION: 99 % | WEIGHT: 156 LBS

## 2018-10-04 DIAGNOSIS — Z12.11 SCREENING FOR COLON CANCER: ICD-10-CM

## 2018-10-04 LAB — GLUCOSE BLDC GLUCOMTR-MCNC: 110 MG/DL (ref 70–130)

## 2018-10-04 PROCEDURE — 25010000002 PROPOFOL 10 MG/ML EMULSION: Performed by: ANESTHESIOLOGY

## 2018-10-04 PROCEDURE — 88305 TISSUE EXAM BY PATHOLOGIST: CPT | Performed by: SURGERY

## 2018-10-04 PROCEDURE — 45380 COLONOSCOPY AND BIOPSY: CPT | Performed by: SURGERY

## 2018-10-04 PROCEDURE — 82962 GLUCOSE BLOOD TEST: CPT

## 2018-10-04 PROCEDURE — S0260 H&P FOR SURGERY: HCPCS | Performed by: SURGERY

## 2018-10-04 RX ORDER — PROPOFOL 10 MG/ML
VIAL (ML) INTRAVENOUS CONTINUOUS PRN
Status: DISCONTINUED | OUTPATIENT
Start: 2018-10-04 | End: 2018-10-04 | Stop reason: SURG

## 2018-10-04 RX ORDER — LIDOCAINE HYDROCHLORIDE 10 MG/ML
0.5 INJECTION, SOLUTION INFILTRATION; PERINEURAL ONCE AS NEEDED
Status: DISCONTINUED | OUTPATIENT
Start: 2018-10-04 | End: 2018-10-04 | Stop reason: HOSPADM

## 2018-10-04 RX ORDER — PROPOFOL 10 MG/ML
VIAL (ML) INTRAVENOUS AS NEEDED
Status: DISCONTINUED | OUTPATIENT
Start: 2018-10-04 | End: 2018-10-04 | Stop reason: SURG

## 2018-10-04 RX ORDER — SODIUM CHLORIDE 0.9 % (FLUSH) 0.9 %
3 SYRINGE (ML) INJECTION AS NEEDED
Status: DISCONTINUED | OUTPATIENT
Start: 2018-10-04 | End: 2018-10-04 | Stop reason: HOSPADM

## 2018-10-04 RX ORDER — SODIUM CHLORIDE, SODIUM LACTATE, POTASSIUM CHLORIDE, CALCIUM CHLORIDE 600; 310; 30; 20 MG/100ML; MG/100ML; MG/100ML; MG/100ML
1000 INJECTION, SOLUTION INTRAVENOUS CONTINUOUS
Status: DISCONTINUED | OUTPATIENT
Start: 2018-10-04 | End: 2018-10-04 | Stop reason: HOSPADM

## 2018-10-04 RX ORDER — LIDOCAINE HYDROCHLORIDE 20 MG/ML
INJECTION, SOLUTION INFILTRATION; PERINEURAL AS NEEDED
Status: DISCONTINUED | OUTPATIENT
Start: 2018-10-04 | End: 2018-10-04 | Stop reason: SURG

## 2018-10-04 RX ADMIN — PROPOFOL 70 MG: 10 INJECTION, EMULSION INTRAVENOUS at 08:55

## 2018-10-04 RX ADMIN — PROPOFOL 100 MCG/KG/MIN: 10 INJECTION, EMULSION INTRAVENOUS at 08:55

## 2018-10-04 RX ADMIN — SODIUM CHLORIDE, POTASSIUM CHLORIDE, SODIUM LACTATE AND CALCIUM CHLORIDE 1000 ML: 600; 310; 30; 20 INJECTION, SOLUTION INTRAVENOUS at 08:13

## 2018-10-04 RX ADMIN — LIDOCAINE HYDROCHLORIDE 60 MG: 20 INJECTION, SOLUTION INFILTRATION; PERINEURAL at 08:55

## 2018-10-04 NOTE — ANESTHESIA PREPROCEDURE EVALUATION
Anesthesia Evaluation                  Airway   Mallampati: I  TM distance: >3 FB  Neck ROM: full  No difficulty expected  Dental - normal exam     Pulmonary - normal exam   Cardiovascular - normal exam    (+) hypertension 2 medications or greater, hyperlipidemia,       Neuro/Psych  GI/Hepatic/Renal/Endo    (+) obesity,   diabetes mellitus type 2 well controlled,     Musculoskeletal     Abdominal  - normal exam    Bowel sounds: normal.   Substance History      OB/GYN          Other                      Anesthesia Plan    ASA 3     MAC     Anesthetic plan, all risks, benefits, and alternatives have been provided, discussed and informed consent has been obtained with: patient.

## 2018-10-04 NOTE — ANESTHESIA POSTPROCEDURE EVALUATION
Patient: Kimi Dominguez    Procedure Summary     Date:  10/04/18 Room / Location:  [facility] ENDOSCOPY 6 / [facility] ENDOSCOPY    Anesthesia Start:  0850 Anesthesia Stop:  0918    Procedure:  COLONOSCOPY to Cecum WITH BIOPSY (N/A ) Diagnosis:       Screening for colon cancer      (Screening for colon cancer [Z12.11])    Surgeon:  Geovanna Rebollar MD Provider:  Angela Prescott MD    Anesthesia Type:  MAC ASA Status:  3          Anesthesia Type: MAC  Last vitals  BP   120/62 (10/04/18 0917)   Temp   36.6 °C (97.8 °F) (10/04/18 0806)   Pulse   76 (10/04/18 0917)   Resp   16 (10/04/18 0917)     SpO2   98 % (10/04/18 0917)     Post Anesthesia Care and Evaluation    Patient location during evaluation: bedside  Patient participation: complete - patient participated  Level of consciousness: awake  Pain management: adequate  Airway patency: patent  Anesthetic complications: No anesthetic complications    Cardiovascular status: acceptable  Respiratory status: acceptable  Hydration status: acceptable

## 2018-10-04 NOTE — H&P
General Surgery  History and Physical    CC: Screening for colon cancer    HPI: The patient is a pleasant 68 y.o. year-old lady who presents today for a repeat screening colonoscopy.  Her last colonoscopy was done 10 years ago by Dr. Issa and significant for diverticulosis, internal hemorrhoids, external hemorrhoids, and a poor bowel prep. Her son has Ulcerative Colitis and she has experienced diarrhea in the last month but denies any blood in the stool.    Past Medical History:   Hypertension  Hyperlipidemia  Type 2 diabetes  Vitamin B12 deficiency  History of rectocele    Past Surgical History:   Colonoscopy (2008, Dr. Issa)  Knee arthroscopy  Rectocele repair  Tubal ligation  Skin cancer excision ×2    Medications:   Prescriptions Prior to Admission   Medication Sig Dispense Refill Last Dose   • aspirin 81 MG tablet Take 1 tablet by mouth daily.   Taking   • carvedilol (COREG) 3.125 MG tablet TAKE ONE TABLET BY MOUTH TWICE A DAY WITH A MEAL 180 tablet 2 Taking   • cyancobalamin (VITAMIN B-12) 100 MCG tablet Take  by mouth daily. As directed   Taking   • GLUCOSAMINE-CHONDROITIN PO Take 1 tablet by mouth.   Taking   • GLYXAMBI 25-5 MG tablet TAKE ONE TABLET BY MOUTH EVERY MORNING BEFORE BREAKFAST 30 tablet 4 Taking   • lisinopril (PRINIVIL,ZESTRIL) 20 MG tablet TAKE ONE TABLET BY MOUTH DAILY 90 tablet 2 Taking   • Melatonin 10 MG tablet Take 10 mg by mouth.   Taking   • metFORMIN ER (GLUCOPHAGE-XR) 500 MG 24 hr tablet TAKE TWO TABLETS BY MOUTH TWICE A  tablet 2    • Multiple Vitamins-Minerals (OCUVITE ADULT 50+) capsule Take 1 capsule by mouth daily.   Taking   • Multiple Vitamins-Minerals (OCUVITE ADULT 50+) capsule Take 1 capsule by mouth.   Taking   • Omega-3 Fatty Acids (FISH OIL) 1000 MG capsule capsule Take 3 capsules by mouth daily.   Taking   • simvastatin (ZOCOR) 40 MG tablet TAKE ONE TABLET BY MOUTH ONCE NIGHTLY 90 tablet 0 Taking       Allergies: No known drug allergies    Family  History: No family history of colon cancer or colon polyps, maternal aunt with history of breast cancer, son with ulcerative colitis    Social History: , unemployed, current cigarette smoker, no regular alcohol use    ROS: A comprehensive review of systems was conducted and significant only for the following positives: Urinary urgency and diarrhea  All other systems reviewed and negative    Physical Exam:  Vitals:    10/04/18 0806   BP: 130/72   Pulse: 90   Resp: 17   Temp: 97.8 °F (36.6 °C)   SpO2: 95%     General: No acute distress, well-nourished & well-developed  HEAD: normocephalic, atraumatic  EYES: normal conjunctiva, sclera anicteric  EARS: grossly normal hearing  NECK: supple, no thyromegaly  CARDIOVASCULAR: regular rate and rhythm  RESPIRATORY: clear to auscultation bilaterally  GASTROINTESTINAL: soft, nontender, non-distended  PSYCHIATRIC: oriented x3, normal mood and affect    ASSESSMENT & PLAN  Mrs. Dominguez is a 68-year-old lady here for a repeat screening colonoscopy.  She has been counseled on the risks of the procedure to include bleeding, possible colon perforation, and possible missed polyp.  Despite these risks, she has consented to proceed.    Geovanna Rebollar MD  General and Endoscopic Surgery  [facility]    [address], [address]  [place], [place], [address]  P: [phone]  F: [phone]

## 2018-10-04 NOTE — DISCHARGE INSTRUCTIONS
For the next 24 hours patient needs to be with a responsible adult.    For 24 hours DO NOT drive, operate machinery, appliances, drink alcohol, make important decisions or sign legal documents.    Start with a light or bland diet and advance to regular diet as tolerated.    Follow recommendations on procedure report provided by your doctor.    Call Dr SANDOVAL for problems [phone]    Problems may include but not limited to: large amounts of bleeding, trouble breathing, repeated vomiting, severe unrelieved pain, fever or chills.

## 2018-10-04 NOTE — OP NOTE
Operative Note :  Geovanna Rebollar MD      Kimi Dominguez  1950    Procedure Date: 10/04/18    Pre-op Diagnosis:  · Screening for colon cancer [Z12.11]  · Diarrhea    Post-Operative Diagnosis:  · Diverticulosis  · Grade 1 internal hemorrhoids  · Anal skin tags    Procedure:   · Flexible colonoscopy to the cecum with cold forcep biopsies    Surgeon: Geovanna Rebollar MD    Assistant: None    Anesthesia:  MAC (monitored anesthetic care)    Estimated Blood Loss: Minimal    Specimens: Rectal biopsies    Complications: None    Indications:  · Mrs. Dominguez is a 68-year-old lady here for a repeat screening colonoscopy.  She has been counseled on the risks of the procedure to include bleeding, possible colon perforation, and possible missed polyp.  Despite these risks, she has consented to proceed.    Findings:   · Grade 1 internal hemorrhoids, external anal skin tags, scattered diverticulosis    Description of procedure:  The patient was brought to the endoscopy suite and yue in the left lateral decubitus position.  Continuous propofol anesthesia was administered.  A surgical timeout was completed.  A digital rectal exam was performed, revealing no abnormalities other than some external anal skin tags from previous external hemorrhoids.  An adult colonoscope was then inserted through the anus and passed under direct visualization to the level of the cecum.  The cecum was identified via the ileocecal valve as well as the appendiceal orifice.  The scope was then slowly withdrawn, examining all circumferential walls of the ascending, transverse, descending, and sigmoid colon.  There were no polyps noted throughout the entirety of the colon.  There was scattered diverticulosis throughout the descending and sigmoid colon with no signs of fecal impaction or bleeding. The cold biopsy forceps were used to obtain a few mucosal biopsies throughout the rectum to assess for microscopic colitis as a source of her recent diarrhea.   Within the rectum the scope was retroflexed, showing grade 1 internal hemorrhoids.  The scope was then withdrawn and the colon desufflated.  The patient had a very good bowel prep and was transferred to the recovery area in stable condition.     Recommendations:  I will call the patient in 1 week or less with the pathology results of the rectal biopsies.  Her next screening colonoscopy will not be due for another 10 years.     Geovanna Rebollar MD  General and Endoscopic Surgery  [facility]    [address], [address]  [place], [place], [address]  P: [phone]  F: [phone]

## 2018-10-05 LAB
CYTO UR: NORMAL
LAB AP CASE REPORT: NORMAL
PATH REPORT.FINAL DX SPEC: NORMAL
PATH REPORT.GROSS SPEC: NORMAL

## 2018-10-15 ENCOUNTER — TELEPHONE (OUTPATIENT)
Dept: SURGERY | Facility: CLINIC | Age: 68
End: 2018-10-15

## 2018-10-15 DIAGNOSIS — K52.9 COLITIS, NONSPECIFIC: Primary | ICD-10-CM

## 2018-10-15 DIAGNOSIS — Z83.79 FAMILY HISTORY OF ULCERATIVE COLITIS: ICD-10-CM

## 2018-10-15 DIAGNOSIS — R19.7 DIARRHEA, UNSPECIFIED TYPE: ICD-10-CM

## 2018-10-15 NOTE — TELEPHONE ENCOUNTER
I called Kimi and left a voicemail explaining the possible colitis found on random biopsies of her rectum obtained during her colonoscopy last week. In the setting of diarrhea and a family history of Ulcerative Colitis in her son, I would like for her to be seen by the [facility] group and have placed a referral to their office. I advised her to call back with any questions or concerns.    Geovanna Rebollar MD  General and Endoscopic Surgery  [facility]    [address], [address]  [place], [place], [address]  P: [phone]  F: [phone]

## 2018-10-25 DIAGNOSIS — E78.2 MIXED HYPERLIPIDEMIA: ICD-10-CM

## 2018-10-25 RX ORDER — SIMVASTATIN 40 MG
TABLET ORAL
Qty: 90 TABLET | Refills: 3 | Status: SHIPPED | OUTPATIENT
Start: 2018-10-25 | End: 2019-10-20 | Stop reason: SDUPTHER

## 2018-11-08 ENCOUNTER — OFFICE VISIT (OUTPATIENT)
Dept: GASTROENTEROLOGY | Facility: CLINIC | Age: 68
End: 2018-11-08

## 2018-11-08 VITALS
BODY MASS INDEX: 30.4 KG/M2 | TEMPERATURE: 97.7 F | HEIGHT: 61 IN | WEIGHT: 161 LBS | DIASTOLIC BLOOD PRESSURE: 72 MMHG | SYSTOLIC BLOOD PRESSURE: 128 MMHG

## 2018-11-08 DIAGNOSIS — K59.1 FUNCTIONAL DIARRHEA: ICD-10-CM

## 2018-11-08 DIAGNOSIS — K52.9 INFLAMMATION OF COLONIC MUCOSA: Primary | ICD-10-CM

## 2018-11-08 PROCEDURE — 99204 OFFICE O/P NEW MOD 45 MIN: CPT | Performed by: INTERNAL MEDICINE

## 2018-11-08 RX ORDER — BUDESONIDE 3 MG/1
CAPSULE, COATED PELLETS ORAL
Qty: 150 CAPSULE | Refills: 0 | Status: SHIPPED | OUTPATIENT
Start: 2018-11-08 | End: 2019-06-06

## 2018-11-08 NOTE — PROGRESS NOTES
"Chief Complaint   Patient presents with   • Diarrhea       Subjective     HPI    Kimi Dominguez is a 68 y.o. female with a past medical history noted below who presents for evaluation of diarrhea.  This has been an issue for the past 3-4 months.  No new medications, no dietary changes, no travel preceding the symptoms.  She describes stools \"like water.\"  At its worse, she was having 6-7 BMs per day.  Associated with urgency and a few accidents.  No nocturnal stools.  Most stools in the morning.  For the past few weeks, a little improvement, only 3-4 BMs per day.  No blood in her stool.  She tried imodium but no improvement.  She has lost some weight due to her symptoms.    Prior baseline was 1-2 formed stools daily.    Colonoscopy with Dr Rebollar October 4, biopsies only taken in the rectum, questionable lymphocytic colitis.  I reviewed the op note, diverticulosis, internal hemorrhoids as well    No family history of GI malignancies.  She does have a son with ulcerative colitis.    Rectocele repair in May.  No abdominal surgeries.  Recent antibiotics.    1. RECTUM, BIOPSY:               MODERATE CHRONIC INFLAMMATION.               FOCUS OF ACUTE CRYPTITIS WITH SOLITARY CRYPT ABSCESS.               NO IDENTIFIED ULCERS, FIBROSIS, OR GRANULOMAS.     COMMENT: The inflammatory pattern is nonspecific, but colitis (including lymphocytic colitis) is possible. Clinical correlation required.     KAYLENE/pkm    Past Medical History:   Diagnosis Date   • B12 deficiency    • Diabetes mellitus (CMS/HCC)    • Hyperlipidemia    • Hypertension    • Loose stools    • Obesity    • Rectocele 05/02/2018   • Urinary incontinence          Current Outpatient Prescriptions:   •  aspirin 81 MG tablet, Take 1 tablet by mouth daily., Disp: , Rfl:   •  carvedilol (COREG) 3.125 MG tablet, TAKE ONE TABLET BY MOUTH TWICE A DAY WITH A MEAL, Disp: 180 tablet, Rfl: 2  •  cyancobalamin (VITAMIN B-12) 100 MCG tablet, Take  by mouth daily. As directed, Disp: " , Rfl:   •  GLUCOSAMINE-CHONDROITIN PO, Take 1 tablet by mouth., Disp: , Rfl:   •  GLYXAMBI 25-5 MG tablet, TAKE ONE TABLET BY MOUTH EVERY MORNING BEFORE BREAKFAST, Disp: 30 tablet, Rfl: 4  •  lisinopril (PRINIVIL,ZESTRIL) 20 MG tablet, TAKE ONE TABLET BY MOUTH DAILY, Disp: 90 tablet, Rfl: 2  •  Melatonin 10 MG tablet, Take 10 mg by mouth., Disp: , Rfl:   •  metFORMIN ER (GLUCOPHAGE-XR) 500 MG 24 hr tablet, TAKE TWO TABLETS BY MOUTH TWICE A DAY, Disp: 360 tablet, Rfl: 2  •  Multiple Vitamins-Minerals (OCUVITE ADULT 50+) capsule, Take 1 capsule by mouth daily., Disp: , Rfl:   •  Multiple Vitamins-Minerals (OCUVITE ADULT 50+) capsule, Take 1 capsule by mouth., Disp: , Rfl:   •  Omega-3 Fatty Acids (FISH OIL) 1000 MG capsule capsule, Take 3 capsules by mouth daily., Disp: , Rfl:   •  simvastatin (ZOCOR) 40 MG tablet, TAKE ONE TABLET BY MOUTH ONCE NIGHTLY, Disp: 90 tablet, Rfl: 3  •  Budesonide (ENTOCORT EC) 3 MG 24 hr capsule, Take 3 tablets daily for 4 weeks, then 2 daily for 2 weeks, and then once daily for 2 weeks and stop, Disp: 150 capsule, Rfl: 0    No Known Allergies    Social History     Social History   • Marital status:      Spouse name: Avtar Srinivasan)   • Number of children: N/A   • Years of education: N/A     Occupational History   • Not on file.     Social History Main Topics   • Smoking status: Never Smoker   • Smokeless tobacco: Never Used      Comment: seldom    • Alcohol use No   • Drug use: No   • Sexual activity: Not on file     Other Topics Concern   • Not on file     Social History Narrative   • No narrative on file       Family History   Problem Relation Age of Onset   • Coronary artery disease Father    • Prostate cancer Father    • Breast cancer Maternal Aunt    • Diabetes Paternal Grandmother         Type 2   • Cancer Other    • Heart disease Other    • Malig Hyperthermia Neg Hx        Review of Systems   Constitutional: Negative for activity change, appetite change and fatigue.   HENT:  Negative for sore throat and trouble swallowing.    Respiratory: Negative.    Cardiovascular: Negative.    Gastrointestinal: Positive for diarrhea. Negative for abdominal distention, abdominal pain and blood in stool.   Endocrine: Negative for cold intolerance and heat intolerance.   Genitourinary: Negative for difficulty urinating, dysuria and frequency.   Musculoskeletal: Negative for arthralgias, back pain and myalgias.   Skin: Negative.    Hematological: Negative for adenopathy. Does not bruise/bleed easily.   All other systems reviewed and are negative.      Objective     Vitals:    11/08/18 0828   BP: 128/72   Temp: 97.7 °F (36.5 °C)     1    11/08/18 0828   Weight: 73 kg (161 lb)     Body mass index is 30.17 kg/m².    Physical Exam   Constitutional: She is oriented to person, place, and time. She appears well-developed and well-nourished. No distress.   HENT:   Head: Normocephalic and atraumatic.   Right Ear: External ear normal.   Left Ear: External ear normal.   Nose: Nose normal.   Mouth/Throat: Oropharynx is clear and moist.   Eyes: Conjunctivae and EOM are normal. Right eye exhibits no discharge. Left eye exhibits no discharge. No scleral icterus.   Neck: Normal range of motion. Neck supple. No thyromegaly present.   No supraclavicular adenopathy   Cardiovascular: Normal rate, regular rhythm, normal heart sounds and intact distal pulses.  Exam reveals no gallop.    No murmur heard.  No lower extremity edema   Pulmonary/Chest: Effort normal and breath sounds normal. No respiratory distress. She has no wheezes.   Abdominal: Soft. Normal appearance and bowel sounds are normal. She exhibits no distension and no mass. There is no hepatosplenomegaly. There is no tenderness. There is no rigidity, no rebound and no guarding. No hernia.   Genitourinary:   Genitourinary Comments: Rectal exam deferred   Musculoskeletal: Normal range of motion. She exhibits no edema or tenderness.   No atrophy of upper or lower  extremities.  Normal digits and nails of both hands.   Lymphadenopathy:     She has no cervical adenopathy.   Neurological: She is alert and oriented to person, place, and time. She displays no atrophy. Coordination normal.   Skin: Skin is warm and dry. No rash noted. She is not diaphoretic. No erythema.   Psychiatric: She has a normal mood and affect. Her behavior is normal. Judgment and thought content normal.   Vitals reviewed.      WBC   Date Value Ref Range Status   08/13/2015 6.77 4.50 - 10.70 K/Cumm Final     RBC   Date Value Ref Range Status   08/13/2015 4.70 3.90 - 5.20 Million Final     Hemoglobin   Date Value Ref Range Status   08/13/2015 11.9 11.9 - 15.5 g/dL Final     Hematocrit   Date Value Ref Range Status   08/13/2015 38.1 35.6 - 45.5 % Final     MCV   Date Value Ref Range Status   08/13/2015 81.1 80.5 - 98.2 fL Final     MCH   Date Value Ref Range Status   08/13/2015 25.3 (L) 26.9 - 32.0 pg Final     MCHC   Date Value Ref Range Status   08/13/2015 31.2 (L) 32.4 - 36.3 g/dL Final     RDW   Date Value Ref Range Status   08/13/2015 13.9 (H) 11.7 - 13.0 % Final     Platelets   Date Value Ref Range Status   08/13/2015 335 140 - 500 K/Cumm Final     Neutrophil Rel %   Date Value Ref Range Status   08/13/2015 55.9 42.7 - 76.0 % Final     Lymphocyte Rel %   Date Value Ref Range Status   08/13/2015 35.9 19.6 - 45.3 % Final     Monocyte Rel %   Date Value Ref Range Status   08/13/2015 6.4 5.0 - 12.0 % Final     Eosinophil Rel %   Date Value Ref Range Status   08/13/2015 1.5 0.3 - 6.2 % Final     Basophil Rel %   Date Value Ref Range Status   08/13/2015 0.3 0.0 - 1.5 % Final     Neutrophils Absolute   Date Value Ref Range Status   08/13/2015 3.8 1.9 - 8.1 K/Cumm Final     Lymphocytes Absolute   Date Value Ref Range Status   08/13/2015 2.4 0.9 - 4.8 K/Cumm Final     Monocytes Absolute   Date Value Ref Range Status   08/13/2015 0.4 0.2 - 1.2 K/Cumm Final     Eosinophils Absolute   Date Value Ref Range Status    08/13/2015 0.1 0.0 - 0.7 K/Cumm Final     Basophils Absolute   Date Value Ref Range Status   08/13/2015 0.0 0.0 - 0.2 K/Cumm Final       Sodium   Date Value Ref Range Status   05/07/2018 141 136 - 145 mmol/L Final     Potassium   Date Value Ref Range Status   05/07/2018 4.9 3.5 - 5.2 mmol/L Final     Total CO2   Date Value Ref Range Status   05/07/2018 25.3 22.0 - 29.0 mmol/L Final     Chloride   Date Value Ref Range Status   05/07/2018 100 98 - 107 mmol/L Final     Creatinine   Date Value Ref Range Status   05/07/2018 0.62 0.57 - 1.00 mg/dL Final     BUN   Date Value Ref Range Status   05/07/2018 16 8 - 23 mg/dL Final     BUN/Creatinine Ratio   Date Value Ref Range Status   05/07/2018 25.8 (H) 7.0 - 25.0 Final     Calcium   Date Value Ref Range Status   05/07/2018 9.6 8.6 - 10.5 mg/dL Final     eGFR Non  Am   Date Value Ref Range Status   05/07/2018 96 >60 mL/min/1.73 Final     Alkaline Phosphatase   Date Value Ref Range Status   05/07/2018 71 39 - 117 U/L Final     ALT (SGPT)   Date Value Ref Range Status   05/07/2018 12 1 - 33 U/L Final     AST (SGOT)   Date Value Ref Range Status   05/07/2018 12 1 - 32 U/L Final     Total Bilirubin   Date Value Ref Range Status   05/07/2018 0.2 0.1 - 1.2 mg/dL Final     Albumin   Date Value Ref Range Status   05/07/2018 4.10 3.50 - 5.20 g/dL Final     A/G Ratio   Date Value Ref Range Status   05/07/2018 1.6 g/dL Final         Imaging Results (last 7 days)     ** No results found for the last 168 hours. **            No notes on file    Assessment/Plan    Diarrhea: Symptoms along with a nonspecific inflammatory biopsy findings are highly suggestive of lymphocytic colitis.    Colon inflammation: Biopsies only taken in the rectum.  However these are suggestive of lymphocytic colitis      Plan  I think the constellation of her clinical symptoms along with the findings on her biopsies warrant treatment for lymphocytic colitis.  I'm going to give her a taper course of  budesonide.  We will follow-up shortly after she has completed that course to see how she is doing.      If her diarrhea persists, then we will use some cholestyramine.      Kimi was seen today for diarrhea.    Diagnoses and all orders for this visit:    Inflammation of colonic mucosa  -     Budesonide (ENTOCORT EC) 3 MG 24 hr capsule; Take 3 tablets daily for 4 weeks, then 2 daily for 2 weeks, and then once daily for 2 weeks and stop    Functional diarrhea  -     Budesonide (ENTOCORT EC) 3 MG 24 hr capsule; Take 3 tablets daily for 4 weeks, then 2 daily for 2 weeks, and then once daily for 2 weeks and stop        I have discussed the above plan with the patient.  They verbalize understanding and are in agreement with the plan.  They have been advised to contact the office for any questions, concerns, or changes related to their health.    Dictated utilizing Dragon dictation

## 2018-11-08 NOTE — PATIENT INSTRUCTIONS
Start the budesonide    Continue to monitor symptoms    For any additional questions, concerns or changes to your condition after today's office visit please contact the office at [phone].

## 2018-11-09 ENCOUNTER — TELEPHONE (OUTPATIENT)
Dept: GASTROENTEROLOGY | Facility: CLINIC | Age: 68
End: 2018-11-09

## 2018-11-09 DIAGNOSIS — Z23 NEED FOR HEPATITIS A IMMUNIZATION: Primary | ICD-10-CM

## 2018-11-09 NOTE — TELEPHONE ENCOUNTER
Faxed request received from [facility] requesting clarification on Budesonide - state directions and quantity do not match.    Call to [facility] @ [phone] and spoke with Pharmacist.  Clarify that quantity is 126.  Verb understanding.

## 2018-11-12 ENCOUNTER — CLINICAL SUPPORT (OUTPATIENT)
Dept: INTERNAL MEDICINE | Age: 68
End: 2018-11-12

## 2018-11-12 ENCOUNTER — FLU SHOT (OUTPATIENT)
Dept: INTERNAL MEDICINE | Age: 68
End: 2018-11-12

## 2018-11-12 PROCEDURE — 90472 IMMUNIZATION ADMIN EACH ADD: CPT | Performed by: INTERNAL MEDICINE

## 2018-11-12 PROCEDURE — 90632 HEPA VACCINE ADULT IM: CPT | Performed by: INTERNAL MEDICINE

## 2018-11-12 PROCEDURE — 90662 IIV NO PRSV INCREASED AG IM: CPT | Performed by: INTERNAL MEDICINE

## 2018-11-12 PROCEDURE — 90471 IMMUNIZATION ADMIN: CPT | Performed by: INTERNAL MEDICINE

## 2018-11-16 ENCOUNTER — HOSPITAL ENCOUNTER (OUTPATIENT)
Dept: CT IMAGING | Facility: HOSPITAL | Age: 68
Discharge: HOME | End: 2018-11-16
Admitting: INTERNAL MEDICINE

## 2018-11-16 ENCOUNTER — OFFICE VISIT (OUTPATIENT)
Dept: INTERNAL MEDICINE | Age: 68
End: 2018-11-16

## 2018-11-16 ENCOUNTER — TELEPHONE (OUTPATIENT)
Dept: INTERNAL MEDICINE | Age: 68
End: 2018-11-16

## 2018-11-16 VITALS
BODY MASS INDEX: 29.83 KG/M2 | OXYGEN SATURATION: 96 % | TEMPERATURE: 99.1 F | DIASTOLIC BLOOD PRESSURE: 62 MMHG | HEIGHT: 61 IN | SYSTOLIC BLOOD PRESSURE: 138 MMHG | HEART RATE: 100 BPM | WEIGHT: 158 LBS

## 2018-11-16 DIAGNOSIS — R10.9 ACUTE LEFT FLANK PAIN: Primary | ICD-10-CM

## 2018-11-16 LAB
BILIRUB BLD-MCNC: NEGATIVE MG/DL
CLARITY, POC: CLEAR
COLOR UR: YELLOW
GLUCOSE UR STRIP-MCNC: ABNORMAL MG/DL
KETONES UR QL: NEGATIVE
LEUKOCYTE EST, POC: NEGATIVE
NITRITE UR-MCNC: NEGATIVE MG/ML
PH UR: 5.5 [PH] (ref 5–8)
PROT UR STRIP-MCNC: NEGATIVE MG/DL
RBC # UR STRIP: NEGATIVE /UL
SP GR UR: 1.01 (ref 1–1.03)
UROBILINOGEN UR QL: NORMAL

## 2018-11-16 PROCEDURE — 99214 OFFICE O/P EST MOD 30 MIN: CPT | Performed by: INTERNAL MEDICINE

## 2018-11-16 PROCEDURE — 81003 URINALYSIS AUTO W/O SCOPE: CPT | Performed by: INTERNAL MEDICINE

## 2018-11-16 PROCEDURE — 74176 CT ABD & PELVIS W/O CONTRAST: CPT

## 2018-11-16 RX ORDER — TRAMADOL HYDROCHLORIDE 50 MG/1
50 TABLET ORAL EVERY 6 HOURS PRN
Qty: 21 TABLET | Refills: 0 | Status: SHIPPED | OUTPATIENT
Start: 2018-11-16 | End: 2019-06-06

## 2018-11-16 NOTE — TELEPHONE ENCOUNTER
Pt called and states she thinks she may have a kidney stone. She has been having pain off & on since Sunday 11.11.18 in her mid back on the right side. The pain gets worse at times and she is having some issues urinating. Pt also stated the pain is much worse today.     Please advise?

## 2018-11-16 NOTE — NURSING NOTE
Dr. Tolentino called,said he had spoken about results with Dr. Leal and patient may go. Informed pt no kidney stones seen and may go. Home per self, no distress.

## 2018-11-16 NOTE — PROGRESS NOTES
[facility] [place] [facility]  HERNANDO LÓPEZ M.D.      Kimi Dominguez / 68 y.o. / female  11/16/2018      ASSESSMENT & PLAN:    1. Acute left flank pain  Possible left side ureterolithiasis  - CT Abdomen Pelvis Stone Protocol (stat)  - POC Urinalysis Dipstick, Automated  - traMADol (ULTRAM) 50 MG tablet; Take 1 tablet by mouth Every 6 (Six) Hours As Needed for Moderate Pain .  Dispense: 21 tablet; Consent / agreement signed. Vj requested.        Summary/Discussion:  She was instructed to call or return if the condition is not improving or worsening. She expressed understanding and agreed to follow above instructions.      No Follow-up on file.    ____________________________________________________________________    MEDICATIONS  Current Outpatient Medications   Medication Sig Dispense Refill   • aspirin 81 MG tablet Take 1 tablet by mouth daily.     • Budesonide (ENTOCORT EC) 3 MG 24 hr capsule Take 3 tablets daily for 4 weeks, then 2 daily for 2 weeks, and then once daily for 2 weeks and stop 150 capsule 0   • carvedilol (COREG) 3.125 MG tablet TAKE ONE TABLET BY MOUTH TWICE A DAY WITH A MEAL 180 tablet 2   • cyancobalamin (VITAMIN B-12) 100 MCG tablet Take  by mouth daily. As directed     • GLUCOSAMINE-CHONDROITIN PO Take 1 tablet by mouth.     • GLYXAMBI 25-5 MG tablet TAKE ONE TABLET BY MOUTH EVERY MORNING BEFORE BREAKFAST 30 tablet 4   • lisinopril (PRINIVIL,ZESTRIL) 20 MG tablet TAKE ONE TABLET BY MOUTH DAILY 90 tablet 2   • Melatonin 10 MG tablet Take 10 mg by mouth.     • metFORMIN ER (GLUCOPHAGE-XR) 500 MG 24 hr tablet TAKE TWO TABLETS BY MOUTH TWICE A  tablet 2   • Multiple Vitamins-Minerals (OCUVITE ADULT 50+) capsule Take 1 capsule by mouth daily.     • Omega-3 Fatty Acids (FISH OIL) 1000 MG capsule capsule Take 3 capsules by mouth daily.     • simvastatin (ZOCOR) 40 MG tablet TAKE ONE TABLET BY MOUTH ONCE NIGHTLY 90 tablet 3         VITALS    Visit Vitals  /62   Pulse 100   Temp 99.1 °F (37.3 °C)  "  Ht 155.6 cm (61.26\")   Wt 71.7 kg (158 lb)   SpO2 96%   BMI 29.60 kg/m²       BP Readings from Last 3 Encounters:   11/16/18 138/62   11/08/18 128/72   10/04/18 129/98     Wt Readings from Last 3 Encounters:   11/16/18 71.7 kg (158 lb)   11/08/18 73 kg (161 lb)   10/04/18 70.8 kg (156 lb)      Body mass index is 29.6 kg/m².      CC:  Main reason(s) for today's visit: Back Pain (x 5-6 days)      HPI:     5 days of acute left back/flank pain, intermittent, sharp/stabbing. Associated with nausea. Denies gross hematuria, dysuria, fever/chills. Denies changes in bowels. Initially thought it was a back strain after raking leaves but not improving. Denies prior history of kidney stones.     Patient Care Team:  Nura Leal MD as PCP - General (Internal Medicine)  Lucas Schultz MD as Consulting Physician (Obstetrics and Gynecology)  Jess Vo MD as Consulting Physician (Dermatology)  ____________________________________________________________________      REVIEW OF SYSTEMS    Review of Systems  Constitutional neg  Resp neg  CV neg   GI neg  No rash on back    PHYSICAL EXAMINATION    Physical Exam   Constitutional:   Appears uncomfortable from pain   Abdominal: Soft. She exhibits no distension. There is no tenderness. There is no rebound, no guarding and no CVA tenderness.         REVIEWED DATA:    Labs:        Imaging:        Medical Tests:       Summary of old records / correspondence / consultant report:        Request outside records:       ALLERGIES  No Known Allergies     PFSH:     The following portions of the patient's history were reviewed and updated as appropriate: Allergies / Current Medications / Past Medical History / Surgical History / Social History / Family History    PROBLEM LIST   Patient Active Problem List   Diagnosis   • Hyperlipidemia   • Hypertension   • Obesity (BMI 30-39.9)   • Primary osteoarthritis of both hips   • Type 2 diabetes mellitus with renal complication (CMS/[facility])   • " Cobalamin deficiency   • Screening for colon cancer   • Functional diarrhea   • Inflammation of colonic mucosa       PAST MEDICAL HISTORY  Past Medical History:   Diagnosis Date   • B12 deficiency    • Diabetes mellitus (CMS/HCC)    • Hyperlipidemia    • Hypertension    • Loose stools    • Obesity    • Rectocele 05/02/2018   • Urinary incontinence        SURGICAL HISTORY  Past Surgical History:   Procedure Laterality Date   • COLONOSCOPY  2008   • KNEE ARTHROSCOPY     • PAP SMEAR     • POSTERIOR VAGINAL REPAIR  05/02/2018    for rectocele   • SKIN CANCER DESTRUCTION      basal cells removed on nose bridge, left thigh squamous removed   • TUBAL ABDOMINAL LIGATION         SOCIAL HISTORY  Social History     Socioeconomic History   • Marital status:      Spouse name: Avtar Srinivasan)   • Number of children: Not on file   • Years of education: Not on file   • Highest education level: Not on file   Tobacco Use   • Smoking status: Never Smoker   • Smokeless tobacco: Never Used   • Tobacco comment: seldom    Substance and Sexual Activity   • Alcohol use: No   • Drug use: No       FAMILY HISTORY  Family History   Problem Relation Age of Onset   • Coronary artery disease Father    • Prostate cancer Father    • Breast cancer Maternal Aunt    • Diabetes Paternal Grandmother         Type 2   • Cancer Other    • Heart disease Other    • Malig Hyperthermia Neg Hx          **Dragon Disclaimer:   Much of this encounter note is an electronic transcription/translation of spoken language to printed text. The electronic translation of spoken language may permit erroneous, or at times, nonsensical words or phrases to be inadvertently transcribed. Although I have reviewed the note for such errors, some may still exist.

## 2018-11-20 ENCOUNTER — HOSPITAL ENCOUNTER (OUTPATIENT)
Dept: GENERAL RADIOLOGY | Facility: HOSPITAL | Age: 68
Discharge: HOME | End: 2018-11-20
Admitting: INTERNAL MEDICINE

## 2018-11-20 ENCOUNTER — TELEPHONE (OUTPATIENT)
Dept: INTERNAL MEDICINE | Age: 68
End: 2018-11-20

## 2018-11-20 DIAGNOSIS — S22.49XD CLOSED FRACTURE OF MULTIPLE RIBS WITH ROUTINE HEALING, UNSPECIFIED LATERALITY, SUBSEQUENT ENCOUNTER: Primary | ICD-10-CM

## 2018-11-20 PROCEDURE — 71111 X-RAY EXAM RIBS/CHEST4/> VWS: CPT

## 2018-11-20 NOTE — TELEPHONE ENCOUNTER
Pain seems out of proportion.     Place order for left rib xray for further evaluation of her symptoms (r/o rib fracture). She can have this done today.     She may take ibuprofen or naproxen in addition for pain if needed for now.     She may need to return tomorrow before holidays if pain is not improving.

## 2018-11-20 NOTE — TELEPHONE ENCOUNTER
Patient said the rx you gave her has not helped her at all.  She said it is much worse at night. It wakes her and she can not sleep. She has to get up and move around. Can you prescribe her something stronger for the pain.  Please call patient at [phone]

## 2018-11-21 DIAGNOSIS — E11.9 TYPE 2 DIABETES MELLITUS WITHOUT COMPLICATION, WITHOUT LONG-TERM CURRENT USE OF INSULIN (HCC): Chronic | ICD-10-CM

## 2018-11-21 RX ORDER — EMPAGLIFLOZIN AND LINAGLIPTIN 25; 5 MG/1; MG/1
TABLET, FILM COATED ORAL
Qty: 30 TABLET | Refills: 5 | Status: SHIPPED | OUTPATIENT
Start: 2018-11-21 | End: 2019-01-23

## 2018-11-26 ENCOUNTER — TELEPHONE (OUTPATIENT)
Dept: GASTROENTEROLOGY | Facility: CLINIC | Age: 68
End: 2018-11-26

## 2018-11-26 ENCOUNTER — TELEPHONE (OUTPATIENT)
Dept: INTERNAL MEDICINE | Age: 68
End: 2018-11-26

## 2018-11-26 DIAGNOSIS — M54.14 THORACIC RADICULITIS: Primary | ICD-10-CM

## 2018-11-26 RX ORDER — METHYLPREDNISOLONE 4 MG/1
TABLET ORAL
Qty: 1 EACH | Refills: 0 | Status: SHIPPED | OUTPATIENT
Start: 2018-11-26 | End: 2019-01-03

## 2018-11-26 NOTE — TELEPHONE ENCOUNTER
Patient did get her my chart message. She is still miserable. She is still unable to sleep more than an hour at a time. She is wondering if it may be her gallbladder maybe.  Years ago her dad had back pain issues and it ended up be gallbladder. Is that a possibility?    Call patient at [phone]

## 2018-11-26 NOTE — TELEPHONE ENCOUNTER
Pt is requesting to hear results from XR performed on 11/20/18.    Pt is still having back pain.    Pls advise.    Pt can be reached #[phone].

## 2018-11-26 NOTE — TELEPHONE ENCOUNTER
It is okay to hold the budesonide during this period of time and then she can resume it once she has completed it.

## 2018-11-26 NOTE — TELEPHONE ENCOUNTER
Let her know I released it to [employer].   Call if not improving by later this week or if symptoms are worsening see me sooner.

## 2018-11-26 NOTE — TELEPHONE ENCOUNTER
Patient did get her my chart message. She is still miserable. She is still unable to sleep more than an hour at a time. She is wondering if it may be her gallbladder maybe.  Years ago her dad had back pain issues and it ended up be gallbladder. Is that a possibility?    Call patient at [phone]

## 2018-11-26 NOTE — TELEPHONE ENCOUNTER
Call from pt.  States has pinched nerve in back and PCP has placed on Medrol dose pack for 5-6 days.  Asking if ok to continue budesonide while on this, or if should hold budesonide.    Question to Dr Antoine.

## 2018-11-26 NOTE — TELEPHONE ENCOUNTER
Call to pt.  Advise per Dr Antoine that ok to hold budesonide during this period of time, and then can resume it once has completed it.  Verb understanding.

## 2018-11-26 NOTE — TELEPHONE ENCOUNTER
I discussed with Kimi about her left side back pain. Sharp in quality, severe at times. Only occurs while sleeping but not at all during the day. Tramadol not helping. Takes Entocort for colitis. Has diabetes with stable blood sugar. Not wanting to go to MRI just yet for suspicion of thoracic radicular pain. Will try Medrol Sha for now. Advised to monitor blood sugar carefully and update me on progress this Wednesday/thursday.

## 2018-11-27 ENCOUNTER — TELEPHONE (OUTPATIENT)
Dept: INTERNAL MEDICINE | Age: 68
End: 2018-11-27

## 2018-11-27 NOTE — TELEPHONE ENCOUNTER
Patient was able to get a co-pay card for Glyxambi 25-5 MG tablet and she has the medication. She also got another co pay card for next year for the medication so we no longer need to try to get the medication approved through her insurance.

## 2018-11-28 ENCOUNTER — TELEPHONE (OUTPATIENT)
Dept: INTERNAL MEDICINE | Age: 68
End: 2018-11-28

## 2018-11-28 NOTE — TELEPHONE ENCOUNTER
PA for Glyxambi 25-5mg Tablet denied via [facility].    Pt called 11/27/18 stating she does not need PA, as she has access to savings card for next full year.    KD

## 2019-01-03 ENCOUNTER — OFFICE VISIT (OUTPATIENT)
Dept: INTERNAL MEDICINE | Age: 69
End: 2019-01-03

## 2019-01-03 VITALS
BODY MASS INDEX: 30.02 KG/M2 | SYSTOLIC BLOOD PRESSURE: 132 MMHG | WEIGHT: 159 LBS | TEMPERATURE: 98.4 F | OXYGEN SATURATION: 97 % | DIASTOLIC BLOOD PRESSURE: 64 MMHG | HEIGHT: 61 IN | HEART RATE: 94 BPM

## 2019-01-03 DIAGNOSIS — E11.29 TYPE 2 DIABETES MELLITUS WITH MICROALBUMINURIA, WITHOUT LONG-TERM CURRENT USE OF INSULIN (HCC): Primary | Chronic | ICD-10-CM

## 2019-01-03 DIAGNOSIS — E78.2 MIXED HYPERLIPIDEMIA: Chronic | ICD-10-CM

## 2019-01-03 DIAGNOSIS — R80.9 TYPE 2 DIABETES MELLITUS WITH MICROALBUMINURIA, WITHOUT LONG-TERM CURRENT USE OF INSULIN (HCC): Primary | Chronic | ICD-10-CM

## 2019-01-03 DIAGNOSIS — I10 ESSENTIAL HYPERTENSION: Chronic | ICD-10-CM

## 2019-01-03 DIAGNOSIS — B02.30 HERPES ZOSTER WITH OPHTHALMIC COMPLICATION, UNSPECIFIED HERPES ZOSTER EYE DISEASE: ICD-10-CM

## 2019-01-03 LAB
ALBUMIN SERPL-MCNC: 4.1 G/DL (ref 3.5–5.2)
ALBUMIN/GLOB SERPL: 1.4 G/DL
ALP SERPL-CCNC: 52 U/L (ref 39–117)
ALT SERPL-CCNC: 18 U/L (ref 1–33)
AST SERPL-CCNC: 7 U/L (ref 1–32)
BILIRUB SERPL-MCNC: 0.2 MG/DL (ref 0.1–1.2)
BUN SERPL-MCNC: 18 MG/DL (ref 8–23)
BUN/CREAT SERPL: 27.3 (ref 7–25)
CALCIUM SERPL-MCNC: 9.6 MG/DL (ref 8.6–10.5)
CHLORIDE SERPL-SCNC: 102 MMOL/L (ref 98–107)
CO2 SERPL-SCNC: 26.1 MMOL/L (ref 22–29)
CREAT SERPL-MCNC: 0.66 MG/DL (ref 0.57–1)
GLOBULIN SER CALC-MCNC: 2.9 GM/DL
GLUCOSE SERPL-MCNC: 118 MG/DL (ref 65–99)
HBA1C MFR BLD: 6.5 % (ref 4.8–5.6)
POTASSIUM SERPL-SCNC: 4.5 MMOL/L (ref 3.5–5.2)
PROT SERPL-MCNC: 7 G/DL (ref 6–8.5)
SODIUM SERPL-SCNC: 142 MMOL/L (ref 136–145)

## 2019-01-03 PROCEDURE — 99214 OFFICE O/P EST MOD 30 MIN: CPT | Performed by: INTERNAL MEDICINE

## 2019-01-03 NOTE — PROGRESS NOTES
[facility] [place]  HERNANDO LÓPEZ M.D.      Kimidontrell Dominguez / 68 y.o. / female  01/03/2019      ASSESSMENT & PLAN:    Problem List Items Addressed This Visit        High    Type 2 diabetes mellitus with renal complication (CMS/HCC) - Primary (Chronic)    Overview     +microalbuminuria (5/2018)         Relevant Medications    lisinopril (PRINIVIL,ZESTRIL) 20 MG tablet    metFORMIN ER (GLUCOPHAGE-XR) 500 MG 24 hr tablet    GLYXAMBI 25-5 MG tablet    Other Relevant Orders    Hemoglobin A1c    Comprehensive Metabolic Panel       Medium    Hyperlipidemia (Chronic)    Overview     Stable. Continue simvastatin 40 mg.         Relevant Medications    Omega-3 Fatty Acids (FISH OIL) 1000 MG capsule capsule    simvastatin (ZOCOR) 40 MG tablet    Hypertension (Chronic)    Overview     Stable. Continue lisinopril 20 and carvedilol 3.125 mg BID.          Relevant Medications    lisinopril (PRINIVIL,ZESTRIL) 20 MG tablet    carvedilol (COREG) 3.125 MG tablet      Other Visit Diagnoses     Herpes zoster with ophthalmic complication, unspecified herpes zoster eye disease            Orders Placed This Encounter   Procedures   • Hemoglobin A1c   • Comprehensive Metabolic Panel     No orders of the defined types were placed in this encounter.      Summary/Discussion:  Instructed to discuss with ophthalmologist re: likely shingles with ocular complication.   Complete course of valacyclovir, continue eye gtts and follow-up.     Return in about 5 months (around 6/3/2019) for Diabetes and co-morbid conditions.  ____________________________________________________________________    MEDICATIONS  Current Outpatient Medications on File Prior to Visit   Medication Sig   • aspirin 81 MG tablet Take 1 tablet by mouth daily.   • Budesonide (ENTOCORT EC) 3 MG 24 hr capsule Take 3 tablets daily for 4 weeks, then 2 daily for 2 weeks, and then once daily for 2 weeks and stop   • carvedilol (COREG) 3.125 MG tablet TAKE ONE TABLET BY MOUTH TWICE A DAY WITH  "A MEAL   • cyancobalamin (VITAMIN B-12) 100 MCG tablet Take  by mouth daily. As directed   • GLUCOSAMINE-CHONDROITIN PO Take 1 tablet by mouth.   • GLYXAMBI 25-5 MG tablet TAKE ONE TABLET BY MOUTH EVERY MORNING BEFORE BREAKFAST   • lisinopril (PRINIVIL,ZESTRIL) 20 MG tablet TAKE ONE TABLET BY MOUTH DAILY   • Melatonin 10 MG tablet Take 10 mg by mouth.   • metFORMIN ER (GLUCOPHAGE-XR) 500 MG 24 hr tablet TAKE TWO TABLETS BY MOUTH TWICE A DAY   • Multiple Vitamins-Minerals (OCUVITE ADULT 50+) capsule Take 1 capsule by mouth daily.   • Omega-3 Fatty Acids (FISH OIL) 1000 MG capsule capsule Take 3 capsules by mouth daily.   • simvastatin (ZOCOR) 40 MG tablet TAKE ONE TABLET BY MOUTH ONCE NIGHTLY   • traMADol (ULTRAM) 50 MG tablet Take 1 tablet by mouth Every 6 (Six) Hours As Needed for Moderate Pain .     VITALS    Visit Vitals  /64   Pulse 94   Temp 98.4 °F (36.9 °C)   Ht 155.6 cm (61.26\")   Wt 72.1 kg (159 lb)   SpO2 97%   BMI 29.79 kg/m²       BP Readings from Last 3 Encounters:   01/03/19 132/64   11/16/18 138/62   11/08/18 128/72     Wt Readings from Last 3 Encounters:   01/03/19 72.1 kg (159 lb)   11/16/18 71.7 kg (158 lb)   11/08/18 73 kg (161 lb)      Body mass index is 29.79 kg/m².    CC:  Main reason(s) for today's visit: Follow-up for diabetes and related medical problems    HPI:     Chronic type 2 diabetes:  Home blood sugar levels: about the same as before, has no significant low sugar symptoms/problems. Current therapy: metformin/metformin ER and Glyxambi.  Most recent relevant labs:   Lab Results   Component Value Date    HGBA1C 6.43 (H) 08/31/2018    HGBA1C 7.07 (H) 05/07/2018    HGBA1C 6.40 (H) 11/30/2017    CREATININE 0.62 05/07/2018    LDL 51 05/07/2018    MICROALBUR 20.6 05/07/2018     Chronic essential hypertension:  Since prior visit: compliant with medication(s), does not check blood pressure at home and denies significant problems with medication(s).  Most recent in-office blood pressure " "readings:   BP Readings from Last 3 Encounters:   01/03/19 132/64   11/16/18 138/62   11/08/18 128/72     Chronic hyperlipidemia:  Current therapy include simvastatin, denies problems with medication.    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 Using valacyclovir and eye gtt. Had recent skin bx of the left forehead region that has not been healing. Now has several erythematous spots on the left facial area.     Patient Care Team:  Nura Leal MD as PCP - General (Internal Medicine)  Lucas Schultz MD as Consulting Physician (Obstetrics and Gynecology)  Jess Vo MD as Consulting Physician (Dermatology)  ____________________________________________________________________    REVIEW OF SYSTEMS    Review of Systems  As noted per HPI  Constitutional neg for fever  Eyes left side blurred vision  Resp neg  CV neg  Neuro neg for headaches or focal neuro changes      PHYSICAL EXAMINATION    Physical Exam   Constitutional: No distress.   HENT:   Head:       Eyes: Pupils are equal, round, and reactive to light. Left conjunctiva is injected (mild).         Constitutional  No distress  Cardiovascular Rate  normal . Rhythm: regular . Heart sounds:  normal  Pulmonary/Chest  Effort normal. Breath sounds:  normal  Psychiatric  Alert. Judgment and thought content normal. Mood normal    REVIEWED DATA:    Labs:   Lab Results   Component Value Date     05/07/2018    K 4.9 05/07/2018    AST 12 05/07/2018    ALT 12 05/07/2018    BUN 16 05/07/2018    CREATININE 0.62 05/07/2018    CREATININE 0.60 01/31/2018    CREATININE 0.68 11/30/2017    EGFRIFNONA 96 05/07/2018    EGFRIFAFRI 117 05/07/2018       Lab Results   Component Value Date    HGBA1C 6.43 (H) 08/31/2018    HGBA1C " 7.07 (H) 05/07/2018    HGBA1C 6.40 (H) 11/30/2017    MICROALBUR 20.6 05/07/2018       Lab Results   Component Value Date    LDL 51 05/07/2018    LDL 52 05/30/2017    LDL 46 08/25/2016    HDL 49 05/07/2018    HDL 58 05/30/2017    TRIG 176 (H) 05/07/2018    CHOLHDLRATIO 2.76 05/07/2018       Lab Results   Component Value Date    TSH 1.450 05/07/2018    FREET4 1.38 05/07/2018          Lab Results   Component Value Date    WBC 6.77 08/13/2015    HGB 11.9 08/13/2015     08/13/2015        Lab Results   Component Value Date    PROTEIN  08/13/2015      Comment:      Negative    GLUCOSEU  08/13/2015      Comment:      Negative    LEUKOCYTESUR Negative 11/16/2018       Imaging:        Medical Tests:        Summary of old records / correspondence / consultant report:        Request outside records:        ALLERGIES  No Known Allergies     PFSH:     The following portions of the patient's history were reviewed and updated as appropriate: Allergies / Current Medications / Past Medical History / Surgical History / Social History / Family History    PROBLEM LIST   Patient Active Problem List   Diagnosis   • Hyperlipidemia   • Hypertension   • Obesity (BMI 30-39.9)   • Primary osteoarthritis of both hips   • Type 2 diabetes mellitus with renal complication (CMS/HCC)   • Cobalamin deficiency   • Screening for colon cancer   • Functional diarrhea   • Inflammation of colonic mucosa       PAST MEDICAL HISTORY  Past Medical History:   Diagnosis Date   • B12 deficiency    • Diabetes mellitus (CMS/HCC)    • Hyperlipidemia    • Hypertension    • Loose stools    • Obesity    • Rectocele 05/02/2018   • Urinary incontinence        SURGICAL HISTORY  Past Surgical History:   Procedure Laterality Date   • COLONOSCOPY  2008   • COLONOSCOPY N/A 10/4/2018    Procedure: COLONOSCOPY to Cecum WITH BIOPSY;  Surgeon: Geovanna Rebollar MD;  Location: [facility] ENDOSCOPY;  Service: General   • KNEE ARTHROSCOPY     • PAP SMEAR     • POSTERIOR VAGINAL  REPAIR  05/02/2018    for rectocele   • SKIN CANCER DESTRUCTION      basal cells removed on nose bridge, left thigh squamous removed   • TUBAL ABDOMINAL LIGATION         SOCIAL HISTORY  Social History     Socioeconomic History   • Marital status:      Spouse name: Avtar Srinivasan)   • Number of children: Not on file   • Years of education: Not on file   • Highest education level: Not on file   Tobacco Use   • Smoking status: Never Smoker   • Smokeless tobacco: Never Used   • Tobacco comment: seldom    Substance and Sexual Activity   • Alcohol use: No   • Drug use: No       FAMILY HISTORY  Family History   Problem Relation Age of Onset   • Coronary artery disease Father    • Prostate cancer Father    • Breast cancer Maternal Aunt    • Diabetes Paternal Grandmother         Type 2   • Cancer Other    • Heart disease Other    • Malig Hyperthermia Neg Hx          **Dragon Disclaimer:   Much of this encounter note is an electronic transcription/translation of spoken language to printed text. The electronic translation of spoken language may permit erroneous, or at times, nonsensical words or phrases to be inadvertently transcribed. Although I have reviewed the note for such errors, some may still exist.

## 2019-01-23 ENCOUNTER — TELEPHONE (OUTPATIENT)
Dept: INTERNAL MEDICINE | Age: 69
End: 2019-01-23

## 2019-01-23 DIAGNOSIS — R80.9 TYPE 2 DIABETES MELLITUS WITH MICROALBUMINURIA, WITHOUT LONG-TERM CURRENT USE OF INSULIN (HCC): Primary | Chronic | ICD-10-CM

## 2019-01-23 DIAGNOSIS — E11.29 TYPE 2 DIABETES MELLITUS WITH MICROALBUMINURIA, WITHOUT LONG-TERM CURRENT USE OF INSULIN (HCC): Primary | Chronic | ICD-10-CM

## 2019-01-23 RX ORDER — PIOGLITAZONEHYDROCHLORIDE 15 MG/1
15 TABLET ORAL DAILY
Qty: 90 TABLET | Refills: 2 | Status: SHIPPED | OUTPATIENT
Start: 2019-01-23 | End: 2019-10-15 | Stop reason: SDUPTHER

## 2019-01-23 NOTE — TELEPHONE ENCOUNTER
Patient called and would like to see about switching her Glyxambi 25-5mg. Patient is worried about the side effects, and states that she has been getting yeast infections off and on, and that is one of the side effects.

## 2019-01-23 NOTE — TELEPHONE ENCOUNTER
Discontinue Glyxambi. Start Tradjenta 5 mg qAM and pioglitazone 15 mg qAM. Continue metformin.     Send sugar readings in 1 month.     (REMINDER: Update med list, maintain dx association, and update change on CURRENT ASSESSMENT/PLAN)

## 2019-01-23 NOTE — ASSESSMENT & PLAN NOTE
Pt to Discontinue Glyxambi. Start Tradjenta 5 mg qAM and pioglitazone 15 mg qAM. Continue metformin. Send sugar readings in 1 month per Dr Leal. NICKY

## 2019-01-23 NOTE — TELEPHONE ENCOUNTER
Discontinue Glyxambi. Start Tradjenta 5 mg qAM and pioglitazone 15 mg qAM. Continue metformin.      Send sugar readings in 1 month.

## 2019-02-07 ENCOUNTER — OFFICE VISIT (OUTPATIENT)
Dept: GASTROENTEROLOGY | Facility: CLINIC | Age: 69
End: 2019-02-07

## 2019-02-07 VITALS
HEIGHT: 61 IN | WEIGHT: 161 LBS | DIASTOLIC BLOOD PRESSURE: 66 MMHG | TEMPERATURE: 98.1 F | SYSTOLIC BLOOD PRESSURE: 120 MMHG | BODY MASS INDEX: 30.4 KG/M2

## 2019-02-07 DIAGNOSIS — K59.1 FUNCTIONAL DIARRHEA: Primary | ICD-10-CM

## 2019-02-07 DIAGNOSIS — K52.832 LYMPHOCYTIC COLITIS: ICD-10-CM

## 2019-02-07 PROCEDURE — 99213 OFFICE O/P EST LOW 20 MIN: CPT | Performed by: INTERNAL MEDICINE

## 2019-02-07 NOTE — PATIENT INSTRUCTIONS
Continue to monitor stools    Imodium as needed    Call if diarrhea worsens    For any additional questions, concerns or changes to your condition after today's office visit please contact the office at [phone].

## 2019-02-07 NOTE — PROGRESS NOTES
Chief Complaint   Patient presents with   • Follow-up   • GI Problem     colitis     Subjective     HPI  Kimi Dominguez is a 68 y.o. female who presents for follow up of lymphocytic colitis with diarrhea.  This was diagnosed on her screening colonoscopy with Dr. Rebollar in October of last year.    She took a 6 week course of budesonide.  Since then she is doing much better. She averages about 2 BMs a day, mostly formed.  She completed the budesonide a few weeks ago.  She denies any abdominal pain, nausea, vomiting    Her weight has been stable.  She has also been changed on some of her diabetic medications and feels that that is improved her symptoms as well.    Past Medical History:   Diagnosis Date   • B12 deficiency    • Diabetes mellitus (CMS/[facility])    • Hyperlipidemia    • Hypertension    • Loose stools    • Obesity    • Rectocele 05/02/2018   • Shingles     left eye   • Urinary incontinence        Social History     Socioeconomic History   • Marital status:      Spouse name: Avtar Srinivasan)   • Number of children: Not on file   • Years of education: Not on file   • Highest education level: Not on file   Tobacco Use   • Smoking status: Never Smoker   • Smokeless tobacco: Never Used   • Tobacco comment: seldom    Substance and Sexual Activity   • Alcohol use: No   • Drug use: No         Current Outpatient Medications:   •  aspirin 81 MG tablet, Take 1 tablet by mouth daily., Disp: , Rfl:   •  Budesonide (ENTOCORT EC) 3 MG 24 hr capsule, Take 3 tablets daily for 4 weeks, then 2 daily for 2 weeks, and then once daily for 2 weeks and stop, Disp: 150 capsule, Rfl: 0  •  carvedilol (COREG) 3.125 MG tablet, TAKE ONE TABLET BY MOUTH TWICE A DAY WITH A MEAL, Disp: 180 tablet, Rfl: 2  •  cyancobalamin (VITAMIN B-12) 100 MCG tablet, Take  by mouth daily. As directed, Disp: , Rfl:   •  GLUCOSAMINE-CHONDROITIN PO, Take 1 tablet by mouth., Disp: , Rfl:   •  linagliptin (TRADJENTA) 5 MG tablet tablet, Take 1 tablet by mouth  Daily. In the am, Disp: 90 tablet, Rfl: 2  •  lisinopril (PRINIVIL,ZESTRIL) 20 MG tablet, TAKE ONE TABLET BY MOUTH DAILY, Disp: 90 tablet, Rfl: 2  •  Melatonin 10 MG tablet, Take 10 mg by mouth., Disp: , Rfl:   •  metFORMIN ER (GLUCOPHAGE-XR) 500 MG 24 hr tablet, TAKE TWO TABLETS BY MOUTH TWICE A DAY, Disp: 360 tablet, Rfl: 2  •  Multiple Vitamins-Minerals (MULTIVITAMIN ADULT PO), Take 1 tablet by mouth Daily., Disp: , Rfl:   •  Multiple Vitamins-Minerals (OCUVITE ADULT 50+) capsule, Take 1 capsule by mouth daily., Disp: , Rfl:   •  Omega-3 Fatty Acids (FISH OIL) 1000 MG capsule capsule, Take 3 capsules by mouth daily., Disp: , Rfl:   •  pioglitazone (ACTOS) 15 MG tablet, Take 1 tablet by mouth Daily. In the am, Disp: 90 tablet, Rfl: 2  •  simvastatin (ZOCOR) 40 MG tablet, TAKE ONE TABLET BY MOUTH ONCE NIGHTLY, Disp: 90 tablet, Rfl: 3  •  traMADol (ULTRAM) 50 MG tablet, Take 1 tablet by mouth Every 6 (Six) Hours As Needed for Moderate Pain ., Disp: 21 tablet, Rfl: 0    Review of Systems   Constitutional: Negative for activity change, appetite change, chills and fever.   HENT: Negative for trouble swallowing.    Respiratory: Negative.    Cardiovascular: Negative.  Negative for chest pain.   Gastrointestinal: Negative for abdominal distention, abdominal pain, anal bleeding, constipation, diarrhea, nausea and vomiting.   Genitourinary: Negative for dysuria, frequency and hematuria.       Objective   Vitals:    02/07/19 0826   BP: 120/66   Temp: 98.1 °F (36.7 °C)         02/07/19  0826   Weight: 73 kg (161 lb)     Body mass index is 30.16 kg/m².      Physical Exam   Constitutional: She is oriented to person, place, and time. She appears well-developed and well-nourished. No distress.   HENT:   Head: Normocephalic and atraumatic.   Right Ear: External ear normal.   Left Ear: External ear normal.   Nose: Nose normal.   Mouth/Throat: Oropharynx is clear and moist.   Eyes: Conjunctivae and EOM are normal. Right eye exhibits  no discharge. Left eye exhibits no discharge. No scleral icterus.   Neck: Normal range of motion. Neck supple. No thyromegaly present.   No supraclavicular adenopathy   Cardiovascular: Normal rate, regular rhythm, normal heart sounds and intact distal pulses. Exam reveals no gallop.   No murmur heard.  No lower extremity edema   Pulmonary/Chest: Effort normal and breath sounds normal. No respiratory distress. She has no wheezes.   Abdominal: Soft. Normal appearance and bowel sounds are normal. She exhibits no distension and no mass. There is no hepatosplenomegaly. There is no tenderness. There is no rigidity, no rebound and no guarding. No hernia.   Genitourinary:   Genitourinary Comments: Rectal exam deferred   Musculoskeletal: Normal range of motion. She exhibits no edema or tenderness.   No atrophy of upper or lower extremities.  Normal digits and nails of both hands.   Lymphadenopathy:     She has no cervical adenopathy.   Neurological: She is alert and oriented to person, place, and time. She displays no atrophy. Coordination normal.   Skin: Skin is warm and dry. No rash noted. She is not diaphoretic. No erythema.   Psychiatric: She has a normal mood and affect. Her behavior is normal. Judgment and thought content normal.   Vitals reviewed.      WBC   Date Value Ref Range Status   08/13/2015 6.77 4.50 - 10.70 K/Cumm Final     RBC   Date Value Ref Range Status   08/13/2015 4.70 3.90 - 5.20 Million Final     Hemoglobin   Date Value Ref Range Status   08/13/2015 11.9 11.9 - 15.5 g/dL Final     Hematocrit   Date Value Ref Range Status   08/13/2015 38.1 35.6 - 45.5 % Final     MCV   Date Value Ref Range Status   08/13/2015 81.1 80.5 - 98.2 fL Final     MCH   Date Value Ref Range Status   08/13/2015 25.3 (L) 26.9 - 32.0 pg Final     MCHC   Date Value Ref Range Status   08/13/2015 31.2 (L) 32.4 - 36.3 g/dL Final     RDW   Date Value Ref Range Status   08/13/2015 13.9 (H) 11.7 - 13.0 % Final     Platelets   Date Value  Ref Range Status   08/13/2015 335 140 - 500 K/Cumm Final     Neutrophil Rel %   Date Value Ref Range Status   08/13/2015 55.9 42.7 - 76.0 % Final     Lymphocyte Rel %   Date Value Ref Range Status   08/13/2015 35.9 19.6 - 45.3 % Final     Monocyte Rel %   Date Value Ref Range Status   08/13/2015 6.4 5.0 - 12.0 % Final     Eosinophil Rel %   Date Value Ref Range Status   08/13/2015 1.5 0.3 - 6.2 % Final     Basophil Rel %   Date Value Ref Range Status   08/13/2015 0.3 0.0 - 1.5 % Final     Neutrophils Absolute   Date Value Ref Range Status   08/13/2015 3.8 1.9 - 8.1 K/Cumm Final     Lymphocytes Absolute   Date Value Ref Range Status   08/13/2015 2.4 0.9 - 4.8 K/Cumm Final     Monocytes Absolute   Date Value Ref Range Status   08/13/2015 0.4 0.2 - 1.2 K/Cumm Final     Eosinophils Absolute   Date Value Ref Range Status   08/13/2015 0.1 0.0 - 0.7 K/Cumm Final     Basophils Absolute   Date Value Ref Range Status   08/13/2015 0.0 0.0 - 0.2 K/Cumm Final       Lab Results   Component Value Date    BUN 18 01/03/2019    CREATININE 0.66 01/03/2019    EGFRIFNONA 89 01/03/2019    EGFRIFAFRI 108 01/03/2019    BCR 27.3 (H) 01/03/2019    CO2 26.1 01/03/2019    CALCIUM 9.6 01/03/2019    PROTENTOTREF 7.0 01/03/2019    ALBUMIN 4.10 01/03/2019    LABIL2 1.4 01/03/2019    AST 7 01/03/2019    ALT 18 01/03/2019         Imaging Results (last 7 days)     ** No results found for the last 168 hours. **            Assessment/Plan    Functional diarrhea: improved following course of budesonide    Lymphocytic colitis: diagnosed per 10/2018 labs    Plan  Continue to monitor stools  She is advised to call the office if she starts to flare up of symptoms of microscopic colitis again, we can either consider stronger antidiarrheals versus another course of budesonide  Follow-up as needed  Kimi was seen today for follow-up and gi problem.    Diagnoses and all orders for this visit:    Functional diarrhea    Lymphocytic colitis        Dictated  utilizing Dragon dictation

## 2019-05-28 DIAGNOSIS — E11.9 TYPE 2 DIABETES MELLITUS WITHOUT COMPLICATION, WITHOUT LONG-TERM CURRENT USE OF INSULIN (HCC): ICD-10-CM

## 2019-05-28 DIAGNOSIS — I10 ESSENTIAL HYPERTENSION: Chronic | ICD-10-CM

## 2019-05-30 RX ORDER — CARVEDILOL 3.12 MG/1
TABLET ORAL
Qty: 180 TABLET | Refills: 1 | Status: SHIPPED | OUTPATIENT
Start: 2019-05-30 | End: 2019-11-21 | Stop reason: SDUPTHER

## 2019-05-30 RX ORDER — LISINOPRIL 20 MG/1
TABLET ORAL
Qty: 90 TABLET | Refills: 1 | Status: SHIPPED | OUTPATIENT
Start: 2019-05-30 | End: 2019-11-21 | Stop reason: SDUPTHER

## 2019-06-06 ENCOUNTER — OFFICE VISIT (OUTPATIENT)
Dept: INTERNAL MEDICINE | Age: 69
End: 2019-06-06

## 2019-06-06 VITALS
HEIGHT: 61 IN | WEIGHT: 167 LBS | DIASTOLIC BLOOD PRESSURE: 64 MMHG | TEMPERATURE: 98.9 F | BODY MASS INDEX: 31.53 KG/M2 | OXYGEN SATURATION: 97 % | HEART RATE: 76 BPM | SYSTOLIC BLOOD PRESSURE: 128 MMHG

## 2019-06-06 DIAGNOSIS — E78.2 MIXED HYPERLIPIDEMIA: Chronic | ICD-10-CM

## 2019-06-06 DIAGNOSIS — Z51.81 THERAPEUTIC DRUG MONITORING: ICD-10-CM

## 2019-06-06 DIAGNOSIS — I10 ESSENTIAL HYPERTENSION: Chronic | ICD-10-CM

## 2019-06-06 DIAGNOSIS — R80.9 TYPE 2 DIABETES MELLITUS WITH MICROALBUMINURIA, WITHOUT LONG-TERM CURRENT USE OF INSULIN (HCC): Primary | Chronic | ICD-10-CM

## 2019-06-06 DIAGNOSIS — E11.29 TYPE 2 DIABETES MELLITUS WITH MICROALBUMINURIA, WITHOUT LONG-TERM CURRENT USE OF INSULIN (HCC): Primary | Chronic | ICD-10-CM

## 2019-06-06 PROCEDURE — 99214 OFFICE O/P EST MOD 30 MIN: CPT | Performed by: INTERNAL MEDICINE

## 2019-06-06 NOTE — PROGRESS NOTES
[facility]  HERNANDO LÓPEZ M.D.      Kimi LAURA Alberto / 69 y.o. / female  06/06/2019    ASSESSMENT & PLAN          Problem List Items Addressed This Visit        High    Type 2 diabetes mellitus with renal complication (CMS/HCC) - Primary (Chronic)    Overview     +microalbuminuria (5/2018)         Current Assessment & Plan     Check A1c. Noted weight gain since stopping Glyxambi.   Discussed GLP-1 AGONIST.          Relevant Medications    metFORMIN ER (GLUCOPHAGE-XR) 500 MG 24 hr tablet    linagliptin (TRADJENTA) 5 MG tablet tablet    pioglitazone (ACTOS) 15 MG tablet    lisinopril (PRINIVIL,ZESTRIL) 20 MG tablet    Other Relevant Orders    Hemoglobin A1c    Microalbumin / Creatinine Urine Ratio - Urine, Clean Catch       Medium    Hyperlipidemia (Chronic)    Overview     Stable. Continue simvastatin 40 mg.         Relevant Medications    Omega-3 Fatty Acids (FISH OIL) 1000 MG capsule capsule    simvastatin (ZOCOR) 40 MG tablet    Other Relevant Orders    Lipid Panel With / Chol / HDL Ratio    Hypertension (Chronic)    Overview     Stable. Continue lisinopril 20 and carvedilol 3.125 mg BID.          Relevant Medications    carvedilol (COREG) 3.125 MG tablet    lisinopril (PRINIVIL,ZESTRIL) 20 MG tablet      Other Visit Diagnoses     Therapeutic drug monitoring               No orders of the defined types were placed in this encounter.      Summary/Discussion:  ·     Next Appointment with me: Visit date not found    Return in about 4 months (around 10/6/2019) for Diabetes and co-morbid conditions.      MEDICATIONS  Current Outpatient Medications   Medication Sig Dispense Refill   • carvedilol (COREG) 3.125 MG tablet TAKE ONE TABLET BY MOUTH TWICE A DAY WITH MEALS 180 tablet 1   • cyancobalamin (VITAMIN B-12) 100 MCG tablet Take  by mouth daily. As directed     • GLUCOSAMINE-CHONDROITIN PO Take 1 tablet by mouth.     • linagliptin (TRADJENTA) 5 MG tablet tablet Take 1 tablet by mouth Daily. In the am 90 tablet 2  "  • lisinopril (PRINIVIL,ZESTRIL) 20 MG tablet TAKE ONE TABLET BY MOUTH DAILY 90 tablet 1   • Melatonin 10 MG tablet Take 10 mg by mouth.     • metFORMIN ER (GLUCOPHAGE-XR) 500 MG 24 hr tablet TAKE TWO TABLETS BY MOUTH TWICE A  tablet 2   • Multiple Vitamins-Minerals (MULTIVITAMIN ADULT PO) Take 1 tablet by mouth Daily.     • Multiple Vitamins-Minerals (OCUVITE ADULT 50+) capsule Take 1 capsule by mouth daily.     • Omega-3 Fatty Acids (FISH OIL) 1000 MG capsule capsule Take 3 capsules by mouth daily.     • pioglitazone (ACTOS) 15 MG tablet Take 1 tablet by mouth Daily. In the am 90 tablet 2   • simvastatin (ZOCOR) 40 MG tablet TAKE ONE TABLET BY MOUTH ONCE NIGHTLY 90 tablet 3     No current facility-administered medications for this visit.           VITALS:    Visit Vitals  /64   Pulse 76   Temp 98.9 °F (37.2 °C)   Ht 155.6 cm (61.26\")   Wt 75.8 kg (167 lb)   SpO2 97%   BMI 31.29 kg/m²       BP Readings from Last 3 Encounters:   06/06/19 128/64   02/07/19 120/66   01/03/19 132/64     Wt Readings from Last 3 Encounters:   06/06/19 75.8 kg (167 lb)   02/07/19 73 kg (161 lb)   01/03/19 72.1 kg (159 lb)      Body mass index is 31.29 kg/m².        CC:  Main reason(s) for today's visit: Diabetes      HPI:     Prior office note reviewed and there has been no significant interval change in history.      Weight gain noted since stopping Glyxambi previously. Started on Tradjenta and pioglitazone. On metformin.   No change in diet.     Patient Care Team:  Nura Leal MD as PCP - General (Internal Medicine)  Lucas Schultz MD as Consulting Physician (Obstetrics and Gynecology)  Jess Vo MD as Consulting Physician (Dermatology)      REVIEW OF SYSTEMS     Review of Systems  Constitutional neg x weight gain  Resp neg  CV neg   neg for recurrent vaginitis       PHYSICAL EXAMINATION     Physical Exam  Constitutional  No distress  Cardiovascular Rate  normal . Rhythm: regular . Heart sounds:  " Normal. No edema.   Psychiatric  Alert. Judgment and thought content normal. Mood normal      REVIEWED DATA     Labs:     Lab Results   Component Value Date     01/03/2019    K 4.5 01/03/2019    CALCIUM 9.6 01/03/2019    AST 7 01/03/2019    ALT 18 01/03/2019    BUN 18 01/03/2019    CREATININE 0.66 01/03/2019    CREATININE 0.62 05/07/2018    CREATININE 0.5 (L) 04/27/2018    EGFRIFNONA 89 01/03/2019    EGFRIFAFRI 108 01/03/2019       Lab Results   Component Value Date    HGBA1C 6.50 (H) 01/03/2019    HGBA1C 6.43 (H) 08/31/2018    HGBA1C 7.07 (H) 05/07/2018    MICROALBUR 20.6 05/07/2018       Lab Results   Component Value Date    LDL 51 05/07/2018    LDL 52 05/30/2017    LDL 46 08/25/2016    HDL 49 05/07/2018    HDL 58 05/30/2017    HDL 45 08/25/2016    TRIG 176 (H) 05/07/2018    TRIG 136 05/30/2017    TRIG 129 08/25/2016    CHOLHDLRATIO 2.76 05/07/2018    CHOLHDLRATIO 2.36 05/30/2017    CHOLHDLRATIO 2.60 08/25/2016       Lab Results   Component Value Date    TSH 1.450 05/07/2018    FREET4 1.38 05/07/2018       Lab Results   Component Value Date    WBC 6.77 08/13/2015    HGB 11.9 08/13/2015     08/13/2015       Lab Results   Component Value Date    PROTEIN  08/13/2015      Comment:      Negative    GLUCOSEU  08/13/2015      Comment:      Negative    LEUKOCYTESUR Negative 11/16/2018       Imaging:         Medical Tests:         Summary of old records / correspondence / consultant report:         Request outside records:         ALLERGIES  No Known Allergies     PFSH:     The following portions of the patient's history were reviewed and updated as appropriate: Allergies / Current Medications / Past Medical History / Surgical History / Social History / Family History    PROBLEM LIST   Patient Active Problem List   Diagnosis   • Hyperlipidemia   • Hypertension   • Obesity (BMI 30-39.9)   • Primary osteoarthritis of both hips   • Type 2 diabetes mellitus with renal complication (CMS/HCC)   • Cobalamin  deficiency   • Screening for colon cancer   • Functional diarrhea   • Inflammation of colonic mucosa   • Lymphocytic colitis       PAST MEDICAL HISTORY  Past Medical History:   Diagnosis Date   • B12 deficiency    • Diabetes mellitus (CMS/HCC)    • Hyperlipidemia    • Hypertension    • Loose stools    • Obesity    • Rectocele 05/02/2018   • Shingles     left eye   • Urinary incontinence        SURGICAL HISTORY  Past Surgical History:   Procedure Laterality Date   • COLONOSCOPY  2008   • COLONOSCOPY N/A 10/4/2018    Procedure: COLONOSCOPY to Cecum WITH BIOPSY;  Surgeon: Geovanna Rebollar MD;  Location: [facility] ENDOSCOPY;  Service: General   • KNEE ARTHROSCOPY     • PAP SMEAR     • POSTERIOR VAGINAL REPAIR  05/02/2018    for rectocele   • SKIN CANCER DESTRUCTION      basal cells removed on nose bridge, left thigh squamous removed   • TUBAL ABDOMINAL LIGATION         SOCIAL HISTORY  Social History     Socioeconomic History   • Marital status:      Spouse name: Avtar Srinivasan)   • Number of children: Not on file   • Years of education: Not on file   • Highest education level: Not on file   Tobacco Use   • Smoking status: Never Smoker   • Smokeless tobacco: Never Used   • Tobacco comment: seldom    Substance and Sexual Activity   • Alcohol use: No   • Drug use: No       FAMILY HISTORY  Family History   Problem Relation Age of Onset   • Coronary artery disease Father    • Prostate cancer Father    • Breast cancer Maternal Aunt    • Diabetes Paternal Grandmother         Type 2   • Cancer Other    • Heart disease Other    • Malig Hyperthermia Neg Hx          **Dragon Disclaimer:   Much of this encounter note is an electronic transcription/translation of spoken language to printed text. The electronic translation of spoken language may permit erroneous, or at times, nonsensical words or phrases to be inadvertently transcribed. Although I have reviewed the note for such errors, some may still exist.       Template  created by Jarrod Leal MD

## 2019-06-06 NOTE — PATIENT INSTRUCTIONS
** IMPORTANT MESSAGE FROM DR. LÓPEZ **    In our office, your satisfaction is VERY important to us.     You may receive a survey from [facility] by mail or E-mail for you to provide feedback about your visit. This information is invaluable for me to know what we can do to improve our services.     I ask that you please take a few minutes to complete the survey and let us know how we are doing in serving your needs. (You may receive the survey more than once for multiple visits)    Thank You !    Dr. López & Staff    _____________________________________________________________________

## 2019-06-11 LAB
ALBUMIN/CREAT UR: <11.5 MG/G CREAT (ref 0–30)
CHOLEST SERPL-MCNC: 121 MG/DL (ref 100–199)
CHOLEST/HDLC SERPL: 2.1 RATIO (ref 0–4.4)
CREAT UR-MCNC: 26.2 MG/DL
DRUGS UR: NORMAL
HBA1C MFR BLD: 6.5 % (ref 4.8–5.6)
HDLC SERPL-MCNC: 57 MG/DL
LDLC SERPL CALC-MCNC: 51 MG/DL (ref 0–99)
MICROALBUMIN UR-MCNC: <3 UG/ML
TRIGL SERPL-MCNC: 65 MG/DL (ref 0–149)
VLDLC SERPL CALC-MCNC: 13 MG/DL (ref 5–40)

## 2019-06-26 DIAGNOSIS — E11.9 TYPE 2 DIABETES MELLITUS WITHOUT COMPLICATION, WITHOUT LONG-TERM CURRENT USE OF INSULIN (HCC): Chronic | ICD-10-CM

## 2019-06-27 RX ORDER — METFORMIN HYDROCHLORIDE 500 MG/1
TABLET, EXTENDED RELEASE ORAL
Qty: 360 TABLET | Refills: 1 | Status: SHIPPED | OUTPATIENT
Start: 2019-06-27 | End: 2019-12-30

## 2019-07-12 ENCOUNTER — TELEPHONE (OUTPATIENT)
Dept: INTERNAL MEDICINE | Age: 69
End: 2019-07-12

## 2019-07-12 NOTE — TELEPHONE ENCOUNTER
Can you contact Michele Rocha (rep who brings the Ozempic samples) and let him know I need some samples to start a patient on it.     Schedule her to see Nati next week for education on the pen and to start samples.

## 2019-07-12 NOTE — TELEPHONE ENCOUNTER
Patient has thought about it and she would like to go on the ozempic.  Do you want to call it in for her or patient said you had mentioned giving her some samples and showing her how to take it. Please call her at [phone]

## 2019-07-16 ENCOUNTER — OFFICE VISIT (OUTPATIENT)
Dept: INTERNAL MEDICINE | Age: 69
End: 2019-07-16

## 2019-07-16 VITALS
BODY MASS INDEX: 31.91 KG/M2 | HEIGHT: 61 IN | TEMPERATURE: 97.8 F | WEIGHT: 169 LBS | HEART RATE: 87 BPM | SYSTOLIC BLOOD PRESSURE: 120 MMHG | DIASTOLIC BLOOD PRESSURE: 70 MMHG | OXYGEN SATURATION: 96 %

## 2019-07-16 DIAGNOSIS — R80.9 TYPE 2 DIABETES MELLITUS WITH MICROALBUMINURIA, WITHOUT LONG-TERM CURRENT USE OF INSULIN (HCC): Primary | Chronic | ICD-10-CM

## 2019-07-16 DIAGNOSIS — E11.29 TYPE 2 DIABETES MELLITUS WITH MICROALBUMINURIA, WITHOUT LONG-TERM CURRENT USE OF INSULIN (HCC): Primary | Chronic | ICD-10-CM

## 2019-07-16 PROCEDURE — 99213 OFFICE O/P EST LOW 20 MIN: CPT | Performed by: NURSE PRACTITIONER

## 2019-07-16 NOTE — PROGRESS NOTES
"Kimi Dominguez / 69 y.o. / female  Encounter Date: 07/16/2019    ASSESSMENT & PLAN:    Problem List Items Addressed This Visit        Endocrine    Type 2 diabetes mellitus with renal complication (CMS/HCC) - Primary (Chronic)    Overview     +microalbuminuria (5/2018)         Relevant Medications    linagliptin (TRADJENTA) 5 MG tablet tablet    pioglitazone (ACTOS) 15 MG tablet    lisinopril (PRINIVIL,ZESTRIL) 20 MG tablet    metFORMIN ER (GLUCOPHAGE-XR) 500 MG 24 hr tablet        No orders of the defined types were placed in this encounter.    No orders of the defined types were placed in this encounter.      Summary/Discussion:  1. Continue current medications as prescribed, start Ozempic. Instruction for initiating Ozempic therapy as discussed: SubQ: Initial: 0.25 mg once weekly for 4 weeks then increase to 0.5 mg once weekly for at least 4 weeks; if further glycemic control is necessary increase to a maximum of 1 mg once weekly.  2. Office visit in 2 month for medication check and A1c check with me.  3. I advised her to call me in the next few weeks after starting the medication if she has any questions. We reviewed together possible side effects and contraindications. She has no family history of thyroid cancer and she personally has no thyroid disorder in [facility]. She is scheduled for follow up with PCP in October.     Return in about 2 months (around 9/16/2019) for follow up with me for A1c and med check .  ________________________________________________________________    VITALS:    Visit Vitals  /70   Pulse 87   Temp 97.8 °F (36.6 °C) (Temporal)   Ht 155.6 cm (61.26\")   Wt 76.7 kg (169 lb)   SpO2 96%   BMI 31.66 kg/m²       BP Readings from Last 3 Encounters:   07/16/19 120/70   06/06/19 128/64   02/07/19 120/66     Wt Readings from Last 3 Encounters:   07/16/19 76.7 kg (169 lb)   06/06/19 75.8 kg (167 lb)   02/07/19 73 kg (161 lb)      Body mass index is 31.66 kg/m².    CC: Main reason(s) for today's visit: " Diabetes      HPI    Patient is a 69 y.o. female who is here for diabetes mellitus type 2 education and medication adjustment.  She is a new patient to me.      Chronic type 2 diabetes:  Current therapy: metformin ER 1000 mg BID, tradjenta 5 mg daily, and pioglitazone 15 Mg daily.  Most recent change to treatment plan: Glyxambi  was discontinued 1/2019 for recurrent yeast infections and Noted weight gain since stopping Glyxambi.   Home blood sugar levels: consistently in acceptable range.   Most recent relevant labs:   Lab Results   Component Value Date    HGBA1C 6.5 (H) 06/06/2019    HGBA1C 6.50 (H) 01/03/2019    HGBA1C 6.43 (H) 08/31/2018    CREATININE 0.66 01/03/2019    LDL 51 06/06/2019    MICROALBUR <3.0 06/06/2019       Patient Care Team:  Nura Leal MD as PCP - General (Internal Medicine)  Lucas Schultz MD as Consulting Physician (Obstetrics and Gynecology)  Jess Vo MD as Consulting Physician (Dermatology)  ____________________________________________________________________    REVIEW OF SYSTEMS    Review of Systems   Constitutional: Negative for appetite change, fatigue and unexpected weight change.   Cardiovascular: Negative for leg swelling.   Gastrointestinal: Negative for nausea.   Endocrine: Negative for polydipsia, polyphagia and polyuria.   Genitourinary: Negative for dysuria, flank pain, frequency and urgency.   Musculoskeletal: Negative for gait problem.   Skin: Negative for color change, rash and wound.   Allergic/Immunologic: Negative for immunocompromised state.   Neurological: Negative for numbness.   Psychiatric/Behavioral: Negative for confusion.       PHYSICAL EXAMINATION    Physical Exam   Constitutional: She is oriented to person, place, and time. She appears well-developed and well-nourished. No distress.   Eyes: Pupils are equal, round, and reactive to light.   Neck: Normal range of motion. Neck supple. No thyromegaly present.   Pulmonary/Chest: Effort normal.    Abdominal: Soft. Bowel sounds are normal. She exhibits no distension.   Musculoskeletal: Normal range of motion. She exhibits no edema.   Neurological: She is alert and oriented to person, place, and time.   Skin: Skin is warm and dry.   Psychiatric: She has a normal mood and affect.   Vitals reviewed.    REVIEWED DATA:    Labs:   Lab Results   Component Value Date     01/03/2019    K 4.5 01/03/2019    AST 7 01/03/2019    ALT 18 01/03/2019    BUN 18 01/03/2019    CREATININE 0.66 01/03/2019    CREATININE 0.62 05/07/2018    CREATININE 0.5 (L) 04/27/2018    EGFRIFNONA 89 01/03/2019    EGFRIFAFRI 108 01/03/2019       Lab Results   Component Value Date    HGBA1C 6.5 (H) 06/06/2019    HGBA1C 6.50 (H) 01/03/2019    HGBA1C 6.43 (H) 08/31/2018    MICROALBUR <3.0 06/06/2019       Lab Results   Component Value Date    LDL 51 06/06/2019    LDL 51 05/07/2018    LDL 52 05/30/2017    HDL 57 06/06/2019    TRIG 65 06/06/2019    CHOLHDLRATIO 2.1 06/06/2019       Lab Results   Component Value Date    TSH 1.450 05/07/2018    FREET4 1.38 05/07/2018          Lab Results   Component Value Date    WBC 6.77 08/13/2015    HGB 11.9 08/13/2015     08/13/2015         Imaging:      Medical Tests:      Summary of old records / correspondence / consultant report:      Request outside records:   ______________________________________________________________________    ALLERGIES  No Known Allergies     MEDICATIONS  Current Outpatient Medications on File Prior to Visit   Medication Sig   • carvedilol (COREG) 3.125 MG tablet TAKE ONE TABLET BY MOUTH TWICE A DAY WITH MEALS   • cyancobalamin (VITAMIN B-12) 100 MCG tablet Take  by mouth daily. As directed   • GLUCOSAMINE-CHONDROITIN PO Take 1 tablet by mouth.   • linagliptin (TRADJENTA) 5 MG tablet tablet Take 1 tablet by mouth Daily. In the am   • lisinopril (PRINIVIL,ZESTRIL) 20 MG tablet TAKE ONE TABLET BY MOUTH DAILY   • Melatonin 10 MG tablet Take 10 mg by mouth.   • metFORMIN ER  (GLUCOPHAGE-XR) 500 MG 24 hr tablet TAKE TWO TABLETS BY MOUTH TWICE A DAY   • Multiple Vitamins-Minerals (MULTIVITAMIN ADULT PO) Take 1 tablet by mouth Daily.   • Multiple Vitamins-Minerals (OCUVITE ADULT 50+) capsule Take 1 capsule by mouth daily.   • Omega-3 Fatty Acids (FISH OIL) 1000 MG capsule capsule Take 3 capsules by mouth daily.   • pioglitazone (ACTOS) 15 MG tablet Take 1 tablet by mouth Daily. In the am   • simvastatin (ZOCOR) 40 MG tablet TAKE ONE TABLET BY MOUTH ONCE NIGHTLY     No current facility-administered medications on file prior to visit.        PFSH:     The following portions of the patient's history were reviewed and updated as appropriate: Allergies / Current Medications / Past Medical History / Surgical History / Social History / Family History    PROBLEM LIST   Patient Active Problem List   Diagnosis   • Hyperlipidemia   • Hypertension   • Obesity (BMI 30-39.9)   • Primary osteoarthritis of both hips   • Type 2 diabetes mellitus with renal complication (CMS/HCC)   • Cobalamin deficiency   • Screening for colon cancer   • Functional diarrhea   • Inflammation of colonic mucosa   • Lymphocytic colitis       PAST MEDICAL HISTORY  Past Medical History:   Diagnosis Date   • B12 deficiency    • Diabetes mellitus (CMS/HCC)    • Hyperlipidemia    • Hypertension    • Loose stools    • Obesity    • Rectocele 05/02/2018   • Shingles     left eye   • Urinary incontinence        SURGICAL HISTORY  Past Surgical History:   Procedure Laterality Date   • COLONOSCOPY  2008   • COLONOSCOPY N/A 10/4/2018    Procedure: COLONOSCOPY to Cecum WITH BIOPSY;  Surgeon: Geovanna Rebollar MD;  [facility]: [facility] ENDOSCOPY;  Service: General   • KNEE ARTHROSCOPY     • PAP SMEAR     • POSTERIOR VAGINAL REPAIR  05/02/2018    for rectocele   • SKIN CANCER DESTRUCTION      basal cells removed on nose bridge, left thigh squamous removed   • TUBAL ABDOMINAL LIGATION         SOCIAL HISTORY  Social History     Socioeconomic  History   • Marital status:      Spouse name: Avtar Srinivasan)   • Number of children: Not on file   • Years of education: Not on file   • Highest education level: Not on file   Tobacco Use   • Smoking status: Never Smoker   • Smokeless tobacco: Never Used   • Tobacco comment: seldom    Substance and Sexual Activity   • Alcohol use: No   • Drug use: No       FAMILY HISTORY  Family History   Problem Relation Age of Onset   • Coronary artery disease Father    • Prostate cancer Father    • Breast cancer Maternal Aunt    • Diabetes Paternal Grandmother         Type 2   • Cancer Other    • Heart disease Other    • Thyroid cancer Neg Hx

## 2019-08-23 ENCOUNTER — TELEPHONE (OUTPATIENT)
Dept: INTERNAL MEDICINE | Age: 69
End: 2019-08-23

## 2019-08-23 DIAGNOSIS — E11.9 TYPE 2 DIABETES MELLITUS WITHOUT COMPLICATION, WITHOUT LONG-TERM CURRENT USE OF INSULIN (HCC): Primary | ICD-10-CM

## 2019-08-23 NOTE — TELEPHONE ENCOUNTER
Ok to order Ozempic to patient's pharmacy.  SubQ: Initial: 0.25 mg once weekly for 4 weeks then increase to 0.5 mg once weekly for at least 4 weeks; if further glycemic control is necessary increase to a maximum of 1 mg once weekly.

## 2019-08-23 NOTE — TELEPHONE ENCOUNTER
Pt called and stated the sample shot she got, is working and she didn't know if she needs to come back to get more or can she call it in?    Pls advise,    PT#[phone]

## 2019-09-16 ENCOUNTER — OFFICE VISIT (OUTPATIENT)
Dept: INTERNAL MEDICINE | Age: 69
End: 2019-09-16

## 2019-09-16 VITALS
DIASTOLIC BLOOD PRESSURE: 70 MMHG | HEIGHT: 61 IN | BODY MASS INDEX: 31.15 KG/M2 | OXYGEN SATURATION: 97 % | TEMPERATURE: 97.6 F | HEART RATE: 100 BPM | SYSTOLIC BLOOD PRESSURE: 130 MMHG | WEIGHT: 165 LBS

## 2019-09-16 DIAGNOSIS — E11.29 TYPE 2 DIABETES MELLITUS WITH MICROALBUMINURIA, WITHOUT LONG-TERM CURRENT USE OF INSULIN (HCC): Primary | Chronic | ICD-10-CM

## 2019-09-16 DIAGNOSIS — R80.9 TYPE 2 DIABETES MELLITUS WITH MICROALBUMINURIA, WITHOUT LONG-TERM CURRENT USE OF INSULIN (HCC): Primary | Chronic | ICD-10-CM

## 2019-09-16 LAB — HBA1C MFR BLD: 6.3 % (ref 4.8–5.6)

## 2019-09-16 PROCEDURE — 99213 OFFICE O/P EST LOW 20 MIN: CPT | Performed by: NURSE PRACTITIONER

## 2019-09-16 RX ORDER — SEMAGLUTIDE 1.34 MG/ML
INJECTION, SOLUTION SUBCUTANEOUS
COMMUNITY
Start: 2019-08-27 | End: 2019-09-24

## 2019-09-16 NOTE — PROGRESS NOTES
[facility] [place] [facility] MEDICINE  Nati Rome APRJOEL      Kimi SANCHEZ Alberto / 69 y.o. / female  09/16/2019      ASSESSMENT & PLAN:    Problem List Items Addressed This Visit        Endocrine    Type 2 diabetes mellitus with renal complication (CMS/[facility]) - Primary (Chronic)    Overview     +microalbuminuria (5/2018)         Relevant Medications    linagliptin (TRADJENTA) 5 MG tablet tablet    pioglitazone (ACTOS) 15 MG tablet    lisinopril (PRINIVIL,ZESTRIL) 20 MG tablet    metFORMIN ER (GLUCOPHAGE-XR) 500 MG 24 hr tablet    OZEMPIC 0.25 or 0.5 MG/DOSE solution pen-injector    Other Relevant Orders    Hemoglobin A1c        Orders Placed This Encounter   Procedures   • Hemoglobin A1c     No orders of the defined types were placed in this encounter.      Summary/Discussion:  1. Blood sugar readings improved at home with addition of Ozempic. Check A1c today. Continue to monitor home readings. Follow up with PCP at scheduled for regular follow up next month.     Return if symptoms worsen or fail to improve, for Next scheduled follow up.  ____________________________________________________________________    MEDICATIONS  Current Outpatient Medications   Medication Sig Dispense Refill   • carvedilol (COREG) 3.125 MG tablet TAKE ONE TABLET BY MOUTH TWICE A DAY WITH MEALS 180 tablet 1   • cyancobalamin (VITAMIN B-12) 100 MCG tablet Take  by mouth daily. As directed     • GLUCOSAMINE-CHONDROITIN PO Take 1 tablet by mouth.     • linagliptin (TRADJENTA) 5 MG tablet tablet Take 1 tablet by mouth Daily. In the am 90 tablet 2   • lisinopril (PRINIVIL,ZESTRIL) 20 MG tablet TAKE ONE TABLET BY MOUTH DAILY 90 tablet 1   • Melatonin 10 MG tablet Take 10 mg by mouth.     • metFORMIN ER (GLUCOPHAGE-XR) 500 MG 24 hr tablet TAKE TWO TABLETS BY MOUTH TWICE A  tablet 1   • Multiple Vitamins-Minerals (MULTIVITAMIN ADULT PO) Take 1 tablet by mouth Daily.     • Multiple Vitamins-Minerals (OCUVITE ADULT 50+) capsule Take 1 capsule by mouth daily.     •  "Omega-3 Fatty Acids (FISH OIL) 1000 MG capsule capsule Take 3 capsules by mouth daily.     • OZEMPIC 0.25 or 0.5 MG/DOSE solution pen-injector      • pioglitazone (ACTOS) 15 MG tablet Take 1 tablet by mouth Daily. In the am 90 tablet 2   • simvastatin (ZOCOR) 40 MG tablet TAKE ONE TABLET BY MOUTH ONCE NIGHTLY 90 tablet 3     No current facility-administered medications for this visit.        VITALS    Visit Vitals  /70   Pulse 100   Temp 97.6 °F (36.4 °C) (Temporal)   Ht 155.6 cm (61.26\")   Wt 74.8 kg (165 lb)   SpO2 97%   BMI 30.91 kg/m²       BP Readings from Last 3 Encounters:   09/16/19 130/70   07/16/19 120/70   06/06/19 128/64     Wt Readings from Last 3 Encounters:   09/16/19 74.8 kg (165 lb)   07/16/19 76.7 kg (169 lb)   06/06/19 75.8 kg (167 lb)      Body mass index is 30.91 kg/m².    CC:  Main reason(s) for today's visit: Follow-up for diabetes and new med check (1 month)    HPI:     Chronic type 2 diabetes:  Current therapy: metformin/metformin ER, pioglitazone, Tradjenta and Ozempic.  Most recent change to treatment plan: Ozempic was started 6 weeks ago.  Home blood sugar levels: better than before.   Most recent relevant labs:   Lab Results   Component Value Date    HGBA1C 6.5 (H) 06/06/2019    HGBA1C 6.50 (H) 01/03/2019    HGBA1C 6.43 (H) 08/31/2018    CREATININE 0.66 01/03/2019    LDL 51 06/06/2019    MICROALBUR <3.0 06/06/2019       Patient Care Team:  Nura Leal MD as PCP - General (Internal Medicine)  Ayad Rios MD as PCP - Claims Attributed  Lucas Schultz MD as Consulting Physician (Obstetrics and Gynecology)  Jess Vo MD as Consulting Physician (Dermatology)  ____________________________________________________________________    REVIEW OF SYSTEMS    Review of Systems  As noted per HPI  Constitutional neg  Resp neg  CV neg      PHYSICAL EXAMINATION    Physical Exam  Constitutional  No distress  Cardiovascular Rate  normal . Rhythm: regular . Heart sounds:  " normal  Pulmonary/Chest  Effort normal. Breath sounds:  normal  Psychiatric  Alert. Judgment and thought content normal. Mood normal    REVIEWED DATA:    Labs:   Lab Results   Component Value Date     01/03/2019    K 4.5 01/03/2019    AST 7 01/03/2019    ALT 18 01/03/2019    BUN 18 01/03/2019    CREATININE 0.66 01/03/2019    CREATININE 0.62 05/07/2018    CREATININE 0.5 (L) 04/27/2018    EGFRIFNONA 89 01/03/2019    EGFRIFAFRI 108 01/03/2019       Lab Results   Component Value Date    HGBA1C 6.5 (H) 06/06/2019    HGBA1C 6.50 (H) 01/03/2019    HGBA1C 6.43 (H) 08/31/2018    MICROALBUR <3.0 06/06/2019       Lab Results   Component Value Date    LDL 51 06/06/2019    LDL 51 05/07/2018    LDL 52 05/30/2017    HDL 57 06/06/2019    HDL 49 05/07/2018    TRIG 65 06/06/2019    CHOLHDLRATIO 2.1 06/06/2019       Lab Results   Component Value Date    TSH 1.450 05/07/2018    FREET4 1.38 05/07/2018          Lab Results   Component Value Date    WBC 6.77 08/13/2015    HGB 11.9 08/13/2015     08/13/2015        Lab Results   Component Value Date    PROTEIN  08/13/2015      Comment:      Negative    GLUCOSEU  08/13/2015      Comment:      Negative    LEUKOCYTESUR Negative 11/16/2018       Imaging:          Medical Tests:          Summary of old records / correspondence / consultant report:          Request outside records:          ALLERGIES  No Known Allergies     PFSH:     The following portions of the patient's history were reviewed and updated as appropriate: Allergies / Current Medications / Past Medical History / Surgical History / Social History / Family History    PROBLEM LIST   Patient Active Problem List   Diagnosis   • Hyperlipidemia   • Hypertension   • Obesity (BMI 30-39.9)   • Primary osteoarthritis of both hips   • Type 2 diabetes mellitus with renal complication (CMS/HCC)   • Cobalamin deficiency   • Screening for colon cancer   • Functional diarrhea   • Inflammation of colonic mucosa   • Lymphocytic colitis        PAST MEDICAL HISTORY  Past Medical History:   Diagnosis Date   • B12 deficiency    • Diabetes mellitus (CMS/HCC)    • Hyperlipidemia    • Hypertension    • Loose stools    • Obesity    • Rectocele 05/02/2018   • Shingles     left eye   • Urinary incontinence        SURGICAL HISTORY  Past Surgical History:   Procedure Laterality Date   • COLONOSCOPY  2008   • COLONOSCOPY N/A 10/4/2018    Procedure: COLONOSCOPY to Cecum WITH BIOPSY;  Surgeon: Geovanna Rebollar MD;  Location: [facility] ENDOSCOPY;  Service: General   • KNEE ARTHROSCOPY     • PAP SMEAR     • POSTERIOR VAGINAL REPAIR  05/02/2018    for rectocele   • SKIN CANCER DESTRUCTION      basal cells removed on nose bridge, left thigh squamous removed   • TUBAL ABDOMINAL LIGATION         SOCIAL HISTORY  Social History     Socioeconomic History   • Marital status:      Spouse name: Avtar Srinivasan)   • Number of children: Not on file   • Years of education: Not on file   • Highest education level: Not on file   Tobacco Use   • Smoking status: Never Smoker   • Smokeless tobacco: Never Used   • Tobacco comment: seldom    Substance and Sexual Activity   • Alcohol use: No   • Drug use: No       FAMILY HISTORY  Family History   Problem Relation Age of Onset   • Coronary artery disease Father    • Prostate cancer Father    • Breast cancer Maternal Aunt    • Diabetes Paternal Grandmother         Type 2   • Cancer Other    • Heart disease Other    • Thyroid cancer Neg Hx

## 2019-09-24 ENCOUNTER — TELEPHONE (OUTPATIENT)
Dept: INTERNAL MEDICINE | Age: 69
End: 2019-09-24

## 2019-09-24 DIAGNOSIS — R80.9 TYPE 2 DIABETES MELLITUS WITH MICROALBUMINURIA, WITHOUT LONG-TERM CURRENT USE OF INSULIN (HCC): Primary | Chronic | ICD-10-CM

## 2019-09-24 DIAGNOSIS — E11.29 TYPE 2 DIABETES MELLITUS WITH MICROALBUMINURIA, WITHOUT LONG-TERM CURRENT USE OF INSULIN (HCC): Primary | Chronic | ICD-10-CM

## 2019-09-24 RX ORDER — SEMAGLUTIDE 1.34 MG/ML
0.5 INJECTION, SOLUTION SUBCUTANEOUS WEEKLY
Qty: 1.5 ML | Refills: 6 | Status: SHIPPED | OUTPATIENT
Start: 2019-09-24 | End: 2019-10-23 | Stop reason: SDUPTHER

## 2019-09-24 NOTE — TELEPHONE ENCOUNTER
Ozempic sample was given to the pt at the last OV. She was taking the 0.5 dose. When the prescription was sent over it stated for her to do 0.25 for 4 weeks then 0.5 thereafter. Patient wants to know why Dr. Leal would put her on a much lower dose when she has already been taking the 0.5 dose? [facility] won't give the pt the 0.5MG because of house the prescription was written.

## 2019-09-24 NOTE — TELEPHONE ENCOUNTER
Order fixed in chart and sent to [facility]. Please call patient and inform her. Continue 0.5 mg dose once weekly, after at least 4 weeks at this dose, increase to 1 mg weekly if needed for improved blood glucose control.

## 2019-10-10 DIAGNOSIS — E11.29 TYPE 2 DIABETES MELLITUS WITH MICROALBUMINURIA, WITHOUT LONG-TERM CURRENT USE OF INSULIN (HCC): Chronic | ICD-10-CM

## 2019-10-10 DIAGNOSIS — R80.9 TYPE 2 DIABETES MELLITUS WITH MICROALBUMINURIA, WITHOUT LONG-TERM CURRENT USE OF INSULIN (HCC): Chronic | ICD-10-CM

## 2019-10-11 ENCOUNTER — TELEPHONE (OUTPATIENT)
Dept: INTERNAL MEDICINE | Age: 69
End: 2019-10-11

## 2019-10-11 RX ORDER — PIOGLITAZONEHYDROCHLORIDE 15 MG/1
TABLET ORAL
Qty: 90 TABLET | Refills: 1 | OUTPATIENT
Start: 2019-10-11

## 2019-10-14 DIAGNOSIS — E11.29 TYPE 2 DIABETES MELLITUS WITH MICROALBUMINURIA, WITHOUT LONG-TERM CURRENT USE OF INSULIN (HCC): Chronic | ICD-10-CM

## 2019-10-14 DIAGNOSIS — R80.9 TYPE 2 DIABETES MELLITUS WITH MICROALBUMINURIA, WITHOUT LONG-TERM CURRENT USE OF INSULIN (HCC): Chronic | ICD-10-CM

## 2019-10-14 RX ORDER — PIOGLITAZONEHYDROCHLORIDE 15 MG/1
TABLET ORAL
Qty: 90 TABLET | Refills: 1 | OUTPATIENT
Start: 2019-10-14

## 2019-10-14 NOTE — TELEPHONE ENCOUNTER
Let her know to continue pioglitazone.   She should be off Tradjenta now that she is on Ozempic.

## 2019-10-15 RX ORDER — PIOGLITAZONEHYDROCHLORIDE 15 MG/1
15 TABLET ORAL DAILY
Qty: 90 TABLET | Refills: 3 | Status: SHIPPED | OUTPATIENT
Start: 2019-10-15 | End: 2020-09-22 | Stop reason: SDUPTHER

## 2019-10-15 RX ORDER — PIOGLITAZONEHYDROCHLORIDE 15 MG/1
15 TABLET ORAL DAILY
Qty: 90 TABLET | Refills: 1 | OUTPATIENT
Start: 2019-10-15

## 2019-10-15 NOTE — TELEPHONE ENCOUNTER
I didn't mean to route it. I declined one request for Actos because there were two active requests. I apologize. You don't have to do anything with it.    KD

## 2019-10-20 DIAGNOSIS — E78.2 MIXED HYPERLIPIDEMIA: ICD-10-CM

## 2019-10-21 RX ORDER — SIMVASTATIN 40 MG
TABLET ORAL
Qty: 90 TABLET | Refills: 3 | Status: SHIPPED | OUTPATIENT
Start: 2019-10-21 | End: 2020-10-14

## 2019-10-23 DIAGNOSIS — R80.9 TYPE 2 DIABETES MELLITUS WITH MICROALBUMINURIA, WITHOUT LONG-TERM CURRENT USE OF INSULIN (HCC): Chronic | ICD-10-CM

## 2019-10-23 DIAGNOSIS — E11.29 TYPE 2 DIABETES MELLITUS WITH MICROALBUMINURIA, WITHOUT LONG-TERM CURRENT USE OF INSULIN (HCC): Chronic | ICD-10-CM

## 2019-10-23 RX ORDER — SEMAGLUTIDE 1.34 MG/ML
0.5 INJECTION, SOLUTION SUBCUTANEOUS WEEKLY
Qty: 1.5 ML | Refills: 5 | Status: SHIPPED | OUTPATIENT
Start: 2019-10-23 | End: 2019-10-25

## 2019-10-25 ENCOUNTER — OFFICE VISIT (OUTPATIENT)
Dept: INTERNAL MEDICINE | Age: 69
End: 2019-10-25

## 2019-10-25 VITALS
DIASTOLIC BLOOD PRESSURE: 84 MMHG | HEIGHT: 61 IN | SYSTOLIC BLOOD PRESSURE: 148 MMHG | WEIGHT: 168 LBS | BODY MASS INDEX: 31.72 KG/M2 | HEART RATE: 90 BPM | TEMPERATURE: 97.7 F | OXYGEN SATURATION: 99 %

## 2019-10-25 DIAGNOSIS — E11.29 TYPE 2 DIABETES MELLITUS WITH MICROALBUMINURIA, WITHOUT LONG-TERM CURRENT USE OF INSULIN (HCC): Primary | Chronic | ICD-10-CM

## 2019-10-25 DIAGNOSIS — E66.9 OBESITY (BMI 30-39.9): Chronic | ICD-10-CM

## 2019-10-25 DIAGNOSIS — I10 ESSENTIAL HYPERTENSION: Chronic | ICD-10-CM

## 2019-10-25 DIAGNOSIS — E78.2 MIXED HYPERLIPIDEMIA: Chronic | ICD-10-CM

## 2019-10-25 DIAGNOSIS — R80.9 TYPE 2 DIABETES MELLITUS WITH MICROALBUMINURIA, WITHOUT LONG-TERM CURRENT USE OF INSULIN (HCC): Primary | Chronic | ICD-10-CM

## 2019-10-25 DIAGNOSIS — Z23 ENCOUNTER FOR IMMUNIZATION: ICD-10-CM

## 2019-10-25 PROCEDURE — 99214 OFFICE O/P EST MOD 30 MIN: CPT | Performed by: INTERNAL MEDICINE

## 2019-10-25 PROCEDURE — G0008 ADMIN INFLUENZA VIRUS VAC: HCPCS | Performed by: INTERNAL MEDICINE

## 2019-10-25 PROCEDURE — 90653 IIV ADJUVANT VACCINE IM: CPT | Performed by: INTERNAL MEDICINE

## 2019-10-25 RX ORDER — SEMAGLUTIDE 1.34 MG/ML
0.5 INJECTION, SOLUTION SUBCUTANEOUS WEEKLY
Qty: 1.5 ML | Refills: 11 | Status: SHIPPED | OUTPATIENT
Start: 2019-10-25 | End: 2020-11-06 | Stop reason: SDUPTHER

## 2019-10-25 NOTE — ASSESSMENT & PLAN NOTE
Lab Results   Component Value Date    LDL 51 06/06/2019    LDL 51 05/07/2018    LDL 52 05/30/2017    HDL 57 06/06/2019    HDL 49 05/07/2018    HDL 58 05/30/2017    TRIG 65 06/06/2019    CHOLHDLRATIO 2.1 06/06/2019    CHOLHDLRATIO 2.76 05/07/2018    CHOLHDLRATIO 2.36 05/30/2017

## 2019-10-25 NOTE — ASSESSMENT & PLAN NOTE
High today. Send blood pressure readings in 2 weeks.   Same medications for now.     BP Readings from Last 3 Encounters:   10/25/19 148/84   09/16/19 130/70   07/16/19 120/70

## 2019-10-25 NOTE — ASSESSMENT & PLAN NOTE
Lab Results   Component Value Date    HGBA1C 6.30 (H) 09/16/2019    HGBA1C 6.5 (H) 06/06/2019    HGBA1C 6.50 (H) 01/03/2019    CREATININE 0.66 01/03/2019    LDL 51 06/06/2019    MICROALBUR <3.0 06/06/2019      Doing great with Ozempic 0.5 mg. Will check A1c on follow-up in 3 months.   Continue pioglitazone 15 mg, metformin  mg 2 BID.

## 2019-10-25 NOTE — PROGRESS NOTES
[facility] [place] [facility]  HERNANDO LÓPEZ M.D.      Kimi Dominguez / 69 y.o. / female  10/25/2019      CHIEF COMPLAINT     Diabetes (4 month f/u); Hyperlipidemia; and Hypertension      ASSESSMENT & PLAN     Problem List Items Addressed This Visit        High    Type 2 diabetes mellitus with renal complication (CMS/HCC) - Primary (Chronic)    Overview     +microalbuminuria (5/2018)         Current Assessment & Plan     Lab Results   Component Value Date    HGBA1C 6.30 (H) 09/16/2019    HGBA1C 6.5 (H) 06/06/2019    HGBA1C 6.50 (H) 01/03/2019    CREATININE 0.66 01/03/2019    LDL 51 06/06/2019    MICROALBUR <3.0 06/06/2019      Doing great with Ozempic 0.5 mg. Will check A1c on follow-up in 3 months.   Continue pioglitazone 15 mg, metformin  mg 2 BID.         Relevant Medications    lisinopril (PRINIVIL,ZESTRIL) 20 MG tablet    metFORMIN ER (GLUCOPHAGE-XR) 500 MG 24 hr tablet    pioglitazone (ACTOS) 15 MG tablet    OZEMPIC, 0.25 OR 0.5 MG/DOSE, 2 MG/1.5ML solution pen-injector       Medium    Hyperlipidemia (Chronic)    Overview     Stable. Continue simvastatin 40 mg.         Current Assessment & Plan     Lab Results   Component Value Date    LDL 51 06/06/2019    LDL 51 05/07/2018    LDL 52 05/30/2017    HDL 57 06/06/2019    HDL 49 05/07/2018    HDL 58 05/30/2017    TRIG 65 06/06/2019    CHOLHDLRATIO 2.1 06/06/2019    CHOLHDLRATIO 2.76 05/07/2018    CHOLHDLRATIO 2.36 05/30/2017             Relevant Medications    Omega-3 Fatty Acids (FISH OIL) 1000 MG capsule capsule    simvastatin (ZOCOR) 40 MG tablet    Hypertension (Chronic)    Overview     Continue lisinopril 20 and carvedilol 3.125 mg BID.          Current Assessment & Plan     High today. Send blood pressure readings in 2 weeks.   Same medications for now.     BP Readings from Last 3 Encounters:   10/25/19 148/84   09/16/19 130/70   07/16/19 120/70             Relevant Medications    carvedilol (COREG) 3.125 MG tablet    lisinopril (PRINIVIL,ZESTRIL) 20 MG tablet        Low    Obesity (BMI 30-39.9) (Chronic)    Overview     Maintain a low sugar/starch/carbohydrate diet and exercise regularly. Weight loss advised.           Current Assessment & Plan     BMI Readings from Last 3 Encounters:   10/25/19 31.47 kg/m²   09/16/19 30.91 kg/m²   07/16/19 31.66 kg/m²      Wt Readings from Last 3 Encounters:   10/25/19 76.2 kg (168 lb)   09/16/19 74.8 kg (165 lb)   07/16/19 76.7 kg (169 lb)               Other Visit Diagnoses     Encounter for immunization        Relevant Orders    Fluzone High Dose =>65Years (Completed)        Orders Placed This Encounter   Procedures   • Fluzone High Dose =>65Years     New Medications Ordered This Visit   Medications   • OZEMPIC, 0.25 OR 0.5 MG/DOSE, 2 MG/1.5ML solution pen-injector     Sig: Inject 0.5 mg under the skin into the appropriate area as directed 1 (One) Time Per Week.     Dispense:  1.5 mL     Refill:  11       Summary/Discussion:  ·       Next Appointment with me: Visit date not found    Return in about 3 months (around 1/25/2020) for Diabetes.      MEDICATIONS     Current Outpatient Medications   Medication Sig Dispense Refill   • carvedilol (COREG) 3.125 MG tablet TAKE ONE TABLET BY MOUTH TWICE A DAY WITH MEALS 180 tablet 1   • cyancobalamin (VITAMIN B-12) 100 MCG tablet Take  by mouth daily. As directed     • GLUCOSAMINE-CHONDROITIN PO Take 1 tablet by mouth.     • lisinopril (PRINIVIL,ZESTRIL) 20 MG tablet TAKE ONE TABLET BY MOUTH DAILY 90 tablet 1   • Melatonin 10 MG tablet Take 10 mg by mouth.     • metFORMIN ER (GLUCOPHAGE-XR) 500 MG 24 hr tablet TAKE TWO TABLETS BY MOUTH TWICE A  tablet 1   • Multiple Vitamins-Minerals (MULTIVITAMIN ADULT PO) Take 1 tablet by mouth Daily.     • Multiple Vitamins-Minerals (OCUVITE ADULT 50+) capsule Take 1 capsule by mouth daily.     • Omega-3 Fatty Acids (FISH OIL) 1000 MG capsule capsule Take 3 capsules by mouth daily.     • OZEMPIC, 0.25 OR 0.5 MG/DOSE, 2 MG/1.5ML solution pen-injector Inject  "0.5 mg under the skin into the appropriate area as directed 1 (One) Time Per Week. 1.5 mL 11   • pioglitazone (ACTOS) 15 MG tablet Take 1 tablet by mouth Daily. In the am 90 tablet 3   • simvastatin (ZOCOR) 40 MG tablet TAKE ONE TABLET BY MOUTH ONCE NIGHTLY 90 tablet 3     No current facility-administered medications for this visit.           VITAL SIGNS     Visit Vitals  /84   Pulse 90   Temp 97.7 °F (36.5 °C)   Ht 155.6 cm (61.26\")   Wt 76.2 kg (168 lb)   SpO2 99%   BMI 31.47 kg/m²       BP Readings from Last 3 Encounters:   10/25/19 148/84   09/16/19 130/70   07/16/19 120/70     Wt Readings from Last 3 Encounters:   10/25/19 76.2 kg (168 lb)   09/16/19 74.8 kg (165 lb)   07/16/19 76.7 kg (169 lb)      Body mass index is 31.47 kg/m².      HISTORY OF PRESENT ILLNESS     Reviewed chief complaint and details of complaint as documented by staff.     Doing well with Ozempic without significant side effects. On 0.5 mg weekly. Weight is stable overall but blood sugar is much improved. Taken off Tradjenta previously.     Chronic essential hypertension:  Since prior visit: compliant with medication(s), does not check blood pressure at home and denies significant problems with medication(s).  Most recent in-office blood pressure readings:   BP Readings from Last 3 Encounters:   10/25/19 148/84   09/16/19 130/70   07/16/19 120/70     Chronic hyperlipidemia:  Current therapy include simvastatin, denies problems with medication.    Most recent labs:   Lab Results   Component Value Date    LDL 51 06/06/2019    LDL 51 05/07/2018    LDL 52 05/30/2017    HDL 57 06/06/2019    HDL 49 05/07/2018    HDL 58 05/30/2017    TRIG 65 06/06/2019    CHOLHDLRATIO 2.1 06/06/2019    CHOLHDLRATIO 2.76 05/07/2018    CHOLHDLRATIO 2.36 05/30/2017          Patient Care Team:  Nura Leal MD as PCP - General (Internal Medicine)  Ayad Rios MD as PCP - Claims Attributed  Lucas Schultz MD as Consulting Physician (Obstetrics and " Gynecology)  Jess Vo MD as Consulting Physician (Dermatology)      REVIEW OF SYSTEMS     Review of Systems  Constitutional neg  Resp neg  CV neg  GI neg       PHYSICAL EXAMINATION     Physical Exam  Constitutional  No distress  Cardiovascular Rate  normal . Rhythm: regular . Heart sounds:  Normal  Pulmonary/Chest: Effort normal. Breath sounds normal.    Psychiatric  Alert. Judgment intact. Thought content normal. Mood normal      REVIEWED DATA     Labs:     Lab Results   Component Value Date     01/03/2019    K 4.5 01/03/2019    CALCIUM 9.6 01/03/2019    AST 7 01/03/2019    ALT 18 01/03/2019    BUN 18 01/03/2019    CREATININE 0.66 01/03/2019    CREATININE 0.62 05/07/2018    CREATININE 0.5 (L) 04/27/2018    EGFRIFNONA 89 01/03/2019    EGFRIFAFRI 108 01/03/2019       Lab Results   Component Value Date    HGBA1C 6.30 (H) 09/16/2019    HGBA1C 6.5 (H) 06/06/2019    HGBA1C 6.50 (H) 01/03/2019     (H) 01/03/2019     (H) 05/07/2018     (H) 04/27/2018    MICROALBUR <3.0 06/06/2019       Lab Results   Component Value Date    LDL 51 06/06/2019    LDL 51 05/07/2018    LDL 52 05/30/2017    HDL 57 06/06/2019    HDL 49 05/07/2018    HDL 58 05/30/2017    TRIG 65 06/06/2019    TRIG 176 (H) 05/07/2018    TRIG 136 05/30/2017    CHOLHDLRATIO 2.1 06/06/2019    CHOLHDLRATIO 2.76 05/07/2018    CHOLHDLRATIO 2.36 05/30/2017       Lab Results   Component Value Date    TSH 1.450 05/07/2018    FREET4 1.38 05/07/2018       Lab Results   Component Value Date    WBC 6.77 08/13/2015    HGB 11.9 08/13/2015     08/13/2015       Lab Results   Component Value Date    PROTEIN  08/13/2015      Comment:      Negative    GLUCOSEU  08/13/2015      Comment:      Negative    LEUKOCYTESUR Negative 11/16/2018       Imaging:         Medical Tests:         Summary of old records / correspondence / consultant report:         Request outside records:         ALLERGIES  No Known Allergies     PFSH:     The following  portions of the patient's history were reviewed and updated as appropriate: Allergies / Current Medications / Past Medical History / Surgical History / Social History / Family History    PROBLEM LIST   Patient Active Problem List   Diagnosis   • Hyperlipidemia   • Hypertension   • Obesity (BMI 30-39.9)   • Primary osteoarthritis of both hips   • Type 2 diabetes mellitus with renal complication (CMS/HCC)   • Cobalamin deficiency   • Screening for colon cancer   • Functional diarrhea   • Inflammation of colonic mucosa   • Lymphocytic colitis       PAST MEDICAL HISTORY  Past Medical History:   Diagnosis Date   • B12 deficiency    • Diabetes mellitus (CMS/HCC)    • Hyperlipidemia    • Hypertension    • Loose stools    • Obesity    • Rectocele 05/02/2018   • Shingles     left eye   • Urinary incontinence        SURGICAL HISTORY  Past Surgical History:   Procedure Laterality Date   • COLONOSCOPY  2008   • COLONOSCOPY N/A 10/4/2018    Procedure: COLONOSCOPY to Cecum WITH BIOPSY;  Surgeon: Geovanna Rebollar MD;  Location: [facility] ENDOSCOPY;  Service: General   • KNEE ARTHROSCOPY     • PAP SMEAR     • POSTERIOR VAGINAL REPAIR  05/02/2018    for rectocele   • SKIN CANCER DESTRUCTION      basal cells removed on nose bridge, left thigh squamous removed   • TUBAL ABDOMINAL LIGATION         SOCIAL HISTORY  Social History     Socioeconomic History   • Marital status:      Spouse name: Avtar Srinivasan)   • Number of children: Not on file   • Years of education: Not on file   • Highest education level: Not on file   Tobacco Use   • Smoking status: Never Smoker   • Smokeless tobacco: Never Used   • Tobacco comment: seldom    Substance and Sexual Activity   • Alcohol use: No   • Drug use: No       FAMILY HISTORY  Family History   Problem Relation Age of Onset   • Coronary artery disease Father    • Prostate cancer Father    • Breast cancer Maternal Aunt    • Diabetes Paternal Grandmother         Type 2   • Cancer Other    • Heart  disease Other    • Thyroid cancer Neg Hx          **Dragon Disclaimer:   Much of this encounter note is an electronic transcription/translation of spoken language to printed text. The electronic translation of spoken language may permit erroneous, or at times, nonsensical words or phrases to be inadvertently transcribed. Although I have reviewed the note for such errors, some may still exist.       Template created by Jarrod Leal MD

## 2019-10-25 NOTE — ASSESSMENT & PLAN NOTE
BMI Readings from Last 3 Encounters:   10/25/19 31.47 kg/m²   09/16/19 30.91 kg/m²   07/16/19 31.66 kg/m²      Wt Readings from Last 3 Encounters:   10/25/19 76.2 kg (168 lb)   09/16/19 74.8 kg (165 lb)   07/16/19 76.7 kg (169 lb)

## 2019-10-25 NOTE — PATIENT INSTRUCTIONS
** IMPORTANT MESSAGE FROM DR. LÓPEZ **    In our office, your satisfaction is VERY important to us.     You may receive a survey from [employer] by mail or E-mail for you to provide feedback about your visit. This information is invaluable for me to know what we can do to improve our services.     I ask that you please take a few minutes to complete the survey and let us know how we are doing in serving your needs. (You may receive the survey more than once for multiple visits)    Thank You !    Dr. López & Staff    __________________________________________________________      ADDITIONAL INSTRUCTION / REMINDERS FROM DR. LÓPEZ

## 2019-11-21 DIAGNOSIS — E11.9 TYPE 2 DIABETES MELLITUS WITHOUT COMPLICATION, WITHOUT LONG-TERM CURRENT USE OF INSULIN (HCC): ICD-10-CM

## 2019-11-21 DIAGNOSIS — I10 ESSENTIAL HYPERTENSION: Chronic | ICD-10-CM

## 2019-11-21 RX ORDER — CARVEDILOL 3.12 MG/1
TABLET ORAL
Qty: 180 TABLET | Refills: 3 | Status: SHIPPED | OUTPATIENT
Start: 2019-11-21 | End: 2020-12-03

## 2019-11-21 RX ORDER — LISINOPRIL 20 MG/1
TABLET ORAL
Qty: 90 TABLET | Refills: 3 | Status: SHIPPED | OUTPATIENT
Start: 2019-11-21 | End: 2020-11-09

## 2019-12-28 DIAGNOSIS — E11.9 TYPE 2 DIABETES MELLITUS WITHOUT COMPLICATION, WITHOUT LONG-TERM CURRENT USE OF INSULIN (HCC): Chronic | ICD-10-CM

## 2019-12-30 RX ORDER — METFORMIN HYDROCHLORIDE 500 MG/1
TABLET, EXTENDED RELEASE ORAL
Qty: 360 TABLET | Refills: 3 | Status: SHIPPED | OUTPATIENT
Start: 2019-12-30 | End: 2020-12-30

## 2020-02-05 ENCOUNTER — OFFICE VISIT (OUTPATIENT)
Dept: INTERNAL MEDICINE | Age: 70
End: 2020-02-05

## 2020-02-05 VITALS
TEMPERATURE: 97.5 F | SYSTOLIC BLOOD PRESSURE: 140 MMHG | HEIGHT: 61 IN | OXYGEN SATURATION: 96 % | DIASTOLIC BLOOD PRESSURE: 70 MMHG | HEART RATE: 92 BPM | BODY MASS INDEX: 30.58 KG/M2 | WEIGHT: 162 LBS

## 2020-02-05 DIAGNOSIS — E11.29 TYPE 2 DIABETES MELLITUS WITH MICROALBUMINURIA, WITHOUT LONG-TERM CURRENT USE OF INSULIN (HCC): Primary | Chronic | ICD-10-CM

## 2020-02-05 DIAGNOSIS — E78.2 MIXED HYPERLIPIDEMIA: Chronic | ICD-10-CM

## 2020-02-05 DIAGNOSIS — I10 ESSENTIAL HYPERTENSION: Chronic | ICD-10-CM

## 2020-02-05 DIAGNOSIS — R80.9 TYPE 2 DIABETES MELLITUS WITH MICROALBUMINURIA, WITHOUT LONG-TERM CURRENT USE OF INSULIN (HCC): Primary | Chronic | ICD-10-CM

## 2020-02-05 LAB
ALBUMIN SERPL-MCNC: 4.4 G/DL (ref 3.5–5.2)
ALBUMIN/GLOB SERPL: 1.8 G/DL
ALP SERPL-CCNC: 58 U/L (ref 39–117)
ALT SERPL-CCNC: 18 U/L (ref 1–33)
AST SERPL-CCNC: 18 U/L (ref 1–32)
BILIRUB SERPL-MCNC: 0.3 MG/DL (ref 0.2–1.2)
BUN SERPL-MCNC: 16 MG/DL (ref 8–23)
BUN/CREAT SERPL: 27.1 (ref 7–25)
CALCIUM SERPL-MCNC: 9.9 MG/DL (ref 8.6–10.5)
CHLORIDE SERPL-SCNC: 101 MMOL/L (ref 98–107)
CO2 SERPL-SCNC: 25.9 MMOL/L (ref 22–29)
CREAT SERPL-MCNC: 0.59 MG/DL (ref 0.57–1)
GLOBULIN SER CALC-MCNC: 2.5 GM/DL
GLUCOSE SERPL-MCNC: 102 MG/DL (ref 65–99)
HBA1C MFR BLD: 6.4 % (ref 4.8–5.6)
POTASSIUM SERPL-SCNC: 4.5 MMOL/L (ref 3.5–5.2)
PROT SERPL-MCNC: 6.9 G/DL (ref 6–8.5)
SODIUM SERPL-SCNC: 140 MMOL/L (ref 136–145)

## 2020-02-05 PROCEDURE — 99214 OFFICE O/P EST MOD 30 MIN: CPT | Performed by: INTERNAL MEDICINE

## 2020-02-05 RX ORDER — MIRABEGRON 50 MG/1
TABLET, FILM COATED, EXTENDED RELEASE ORAL DAILY
COMMUNITY
Start: 2020-01-29 | End: 2021-09-17

## 2020-02-05 RX ORDER — CONJUGATED ESTROGENS 0.62 MG/G
0.5 CREAM VAGINAL DAILY
COMMUNITY
Start: 2020-01-08 | End: 2021-09-17

## 2020-02-05 NOTE — ASSESSMENT & PLAN NOTE
Lab Results   Component Value Date    LDL 51 06/06/2019    LDL 51 05/07/2018    LDL 52 05/30/2017    HDL 57 06/06/2019

## 2020-02-05 NOTE — ASSESSMENT & PLAN NOTE
Continue Ozempic 0.5 mg weekly, pioglitazone 15, and metformin  mg 2 BID.   Check A1c today.     Lab Results   Component Value Date    HGBA1C 6.30 (H) 09/16/2019    HGBA1C 6.5 (H) 06/06/2019    HGBA1C 6.50 (H) 01/03/2019

## 2020-02-05 NOTE — ASSESSMENT & PLAN NOTE
Get new arm blood pressure meter. Home blood pressure is consistently < 125 SBP.   Continue lisinopril 20 mg and carvedilol 3.125 mg BID.

## 2020-02-05 NOTE — PROGRESS NOTES
[facility] [place] [facility]  HERNANDO LÓPEZ M.D.      Kimi Dominguez / 69 y.o. / female  02/05/2020      CHIEF COMPLAINT     Diabetes      ASSESSMENT & PLAN     Problem List Items Addressed This Visit        High    Type 2 diabetes mellitus with renal complication (CMS/[facility]) - Primary (Chronic)    Overview     +microalbuminuria (5/2018)         Current Assessment & Plan     Continue Ozempic 0.5 mg weekly, pioglitazone 15, and metformin  mg 2 BID.   Check A1c today.     Lab Results   Component Value Date    HGBA1C 6.30 (H) 09/16/2019    HGBA1C 6.5 (H) 06/06/2019    HGBA1C 6.50 (H) 01/03/2019             Relevant Medications    pioglitazone (ACTOS) 15 MG tablet    OZEMPIC, 0.25 OR 0.5 MG/DOSE, 2 MG/1.5ML solution pen-injector    lisinopril (PRINIVIL,ZESTRIL) 20 MG tablet    metFORMIN ER (GLUCOPHAGE-XR) 500 MG 24 hr tablet    Other Relevant Orders    Hemoglobin A1c    Comprehensive Metabolic Panel       Medium    Hyperlipidemia (Chronic)    Overview     Continue statin therapy.          Current Assessment & Plan     Lab Results   Component Value Date    LDL 51 06/06/2019    LDL 51 05/07/2018    LDL 52 05/30/2017    HDL 57 06/06/2019             Relevant Medications    Omega-3 Fatty Acids (FISH OIL) 1000 MG capsule capsule    simvastatin (ZOCOR) 40 MG tablet    Hypertension (Chronic)    Current Assessment & Plan     Get new arm blood pressure meter. Home blood pressure is consistently < 125 SBP.   Continue lisinopril 20 mg and carvedilol 3.125 mg BID.          Relevant Medications    lisinopril (PRINIVIL,ZESTRIL) 20 MG tablet    carvedilol (COREG) 3.125 MG tablet        Orders Placed This Encounter   Procedures   • Hemoglobin A1c   • Comprehensive Metabolic Panel     No orders of the defined types were placed in this encounter.      Summary/Discussion:  ·       Next Appointment with me: Visit date not found    Return in about 5 months (around 7/5/2020) for Diabetes.      MEDICATIONS     Current Outpatient Medications  "  Medication Sig Dispense Refill   • carvedilol (COREG) 3.125 MG tablet TAKE ONE TABLET BY MOUTH TWICE A DAY WITH MEALS 180 tablet 3   • cyancobalamin (VITAMIN B-12) 100 MCG tablet Take  by mouth daily. As directed     • GLUCOSAMINE-CHONDROITIN PO Take 1 tablet by mouth.     • lisinopril (PRINIVIL,ZESTRIL) 20 MG tablet TAKE ONE TABLET BY MOUTH DAILY 90 tablet 3   • metFORMIN ER (GLUCOPHAGE-XR) 500 MG 24 hr tablet TAKE TWO TABLETS BY MOUTH TWICE A  tablet 3   • Multiple Vitamins-Minerals (MULTIVITAMIN ADULT PO) Take 1 tablet by mouth Daily.     • Multiple Vitamins-Minerals (OCUVITE ADULT 50+) capsule Take 1 capsule by mouth daily.     • MYRBETRIQ 50 MG tablet sustained-release 24 hour 24 hr tablet Daily.     • Omega-3 Fatty Acids (FISH OIL) 1000 MG capsule capsule Take 3 capsules by mouth daily.     • OZEMPIC, 0.25 OR 0.5 MG/DOSE, 2 MG/1.5ML solution pen-injector Inject 0.5 mg under the skin into the appropriate area as directed 1 (One) Time Per Week. 1.5 mL 11   • pioglitazone (ACTOS) 15 MG tablet Take 1 tablet by mouth Daily. In the am 90 tablet 3   • PREMARIN 0.625 MG/GM vaginal cream Insert 0.5 g into the vagina Daily.     • simvastatin (ZOCOR) 40 MG tablet TAKE ONE TABLET BY MOUTH ONCE NIGHTLY 90 tablet 3   • Melatonin 10 MG tablet Take 10 mg by mouth.       No current facility-administered medications for this visit.           VITAL SIGNS     Visit Vitals  /70   Pulse 92   Temp 97.5 °F (36.4 °C)   Ht 155.6 cm (61.26\")   Wt 73.5 kg (162 lb)   SpO2 96%   BMI 30.35 kg/m²       BP Readings from Last 3 Encounters:   02/05/20 140/70   10/25/19 148/84   09/16/19 130/70     Wt Readings from Last 3 Encounters:   02/05/20 73.5 kg (162 lb)   10/25/19 76.2 kg (168 lb)   09/16/19 74.8 kg (165 lb)      Body mass index is 30.35 kg/m².      HISTORY OF PRESENT ILLNESS     DM 2: doing well with Ozempic without problems. Helping with weight loss. FBS are fine.   Lab Results   Component Value Date    HGBA1C 6.30 (H) " 09/16/2019    HGBA1C 6.5 (H) 06/06/2019    HGBA1C 6.50 (H) 01/03/2019      Hypertension: uses a wrist blood pressure cuff and SBP < 125 consistently.     Hyperlipidemia: on statin medication without problems.   Lab Results   Component Value Date    LDL 51 06/06/2019    LDL 51 05/07/2018    LDL 52 05/30/2017    HDL 57 06/06/2019        Patient Care Team:  Nura Leal MD as PCP - General (Internal Medicine)  Lucas Schultz MD as Consulting Physician (Obstetrics and Gynecology)  Jess Vo MD as Consulting Physician (Dermatology)      REVIEW OF SYSTEMS     Review of Systems  Constitutional neg x weight loss   Resp neg  CV neg  GI neg for abdominal pain or severe nausea     PHYSICAL EXAMINATION     Physical Exam  Constitutional  No distress  Cardiovascular Rate  normal . Rhythm: regular . Heart sounds:  Normal  Psychiatric  Alert. Judgment intact. Thought content normal. Mood normal    REVIEWED DATA     Labs:     Lab Results   Component Value Date     01/03/2019    K 4.5 01/03/2019    CALCIUM 9.6 01/03/2019    AST 7 01/03/2019    ALT 18 01/03/2019    BUN 18 01/03/2019    CREATININE 0.66 01/03/2019    CREATININE 0.62 05/07/2018    CREATININE 0.5 (L) 04/27/2018    EGFRIFNONA 89 01/03/2019    EGFRIFAFRI 108 01/03/2019       Lab Results   Component Value Date    HGBA1C 6.30 (H) 09/16/2019    HGBA1C 6.5 (H) 06/06/2019    HGBA1C 6.50 (H) 01/03/2019     (H) 01/03/2019     (H) 05/07/2018     (H) 04/27/2018    MICROALBUR <3.0 06/06/2019       Lab Results   Component Value Date    LDL 51 06/06/2019    LDL 51 05/07/2018    LDL 52 05/30/2017    HDL 57 06/06/2019    HDL 49 05/07/2018    HDL 58 05/30/2017    TRIG 65 06/06/2019    TRIG 176 (H) 05/07/2018    TRIG 136 05/30/2017    CHOLHDLRATIO 2.1 06/06/2019    CHOLHDLRATIO 2.76 05/07/2018    CHOLHDLRATIO 2.36 05/30/2017       Lab Results   Component Value Date    TSH 1.450 05/07/2018    FREET4 1.38 05/07/2018       Lab Results   Component  Value Date    WBC 8.71 05/03/2018    HGB 10.9 (L) 05/03/2018    HGB 11.9 08/13/2015     05/03/2018       Lab Results   Component Value Date    PROTEIN  08/13/2015      Comment:      Negative    GLUCOSEU  08/13/2015      Comment:      Negative    LEUKOCYTESUR Negative 11/16/2018       Imaging:         Medical Tests:         Summary of old records / correspondence / consultant report:         Request outside records:         ALLERGIES  No Known Allergies     PFSH:     The following portions of the patient's history were reviewed and updated as appropriate: Allergies / Current Medications / Past Medical History / Surgical History / Social History / Family History    PROBLEM LIST   Patient Active Problem List   Diagnosis   • Hyperlipidemia   • Hypertension   • Obesity (BMI 30-39.9)   • Primary osteoarthritis of both hips   • Type 2 diabetes mellitus with renal complication (CMS/HCC)   • Cobalamin deficiency   • Screening for colon cancer   • Functional diarrhea   • Inflammation of colonic mucosa   • Lymphocytic colitis       PAST MEDICAL HISTORY  Past Medical History:   Diagnosis Date   • B12 deficiency    • Diabetes mellitus (CMS/HCC)    • Hyperlipidemia    • Hypertension    • Loose stools    • Obesity    • Rectocele 05/02/2018   • Shingles     left eye   • Urinary incontinence        SURGICAL HISTORY  Past Surgical History:   Procedure Laterality Date   • COLONOSCOPY  2008   • COLONOSCOPY N/A 10/4/2018    Procedure: COLONOSCOPY to Cecum WITH BIOPSY;  Surgeon: Geovanna Rebollar MD;  Location: [facility] ENDOSCOPY;  Service: General   • KNEE ARTHROSCOPY     • PAP SMEAR     • POSTERIOR VAGINAL REPAIR  05/02/2018    for rectocele   • SKIN CANCER DESTRUCTION      basal cells removed on nose bridge, left thigh squamous removed   • TUBAL ABDOMINAL LIGATION         SOCIAL HISTORY  Social History     Socioeconomic History   • Marital status:      Spouse name: Avtar Zarina)   • Number of children: Not on file   •  Years of education: Not on file   • Highest education level: Not on file   Tobacco Use   • Smoking status: Never Smoker   • Smokeless tobacco: Never Used   • Tobacco comment: seldom    Substance and Sexual Activity   • Alcohol use: No   • Drug use: No       FAMILY HISTORY  Family History   Problem Relation Age of Onset   • Coronary artery disease Father    • Prostate cancer Father    • Breast cancer Maternal Aunt    • Diabetes Paternal Grandmother         Type 2   • Cancer Other    • Heart disease Other    • Thyroid cancer Neg Hx          **Dragon Disclaimer:   Much of this encounter note is an electronic transcription/translation of spoken language to printed text. The electronic translation of spoken language may permit erroneous, or at times, nonsensical words or phrases to be inadvertently transcribed. Although I have reviewed the note for such errors, some may still exist.       Template created by Jarrod Leal MD

## 2020-07-08 ENCOUNTER — OFFICE VISIT (OUTPATIENT)
Dept: INTERNAL MEDICINE | Age: 70
End: 2020-07-08

## 2020-07-08 VITALS
WEIGHT: 164 LBS | BODY MASS INDEX: 30.96 KG/M2 | TEMPERATURE: 97.6 F | HEART RATE: 84 BPM | DIASTOLIC BLOOD PRESSURE: 70 MMHG | OXYGEN SATURATION: 99 % | HEIGHT: 61 IN | SYSTOLIC BLOOD PRESSURE: 140 MMHG

## 2020-07-08 DIAGNOSIS — I10 ESSENTIAL HYPERTENSION: Primary | Chronic | ICD-10-CM

## 2020-07-08 DIAGNOSIS — E11.29 TYPE 2 DIABETES MELLITUS WITH MICROALBUMINURIA, WITHOUT LONG-TERM CURRENT USE OF INSULIN (HCC): Chronic | ICD-10-CM

## 2020-07-08 DIAGNOSIS — R80.9 TYPE 2 DIABETES MELLITUS WITH MICROALBUMINURIA, WITHOUT LONG-TERM CURRENT USE OF INSULIN (HCC): Chronic | ICD-10-CM

## 2020-07-08 DIAGNOSIS — E78.2 MIXED HYPERLIPIDEMIA: Chronic | ICD-10-CM

## 2020-07-08 LAB
ALBUMIN SERPL-MCNC: 4.6 G/DL (ref 3.5–5.2)
ALBUMIN/GLOB SERPL: 1.9 G/DL
ALP SERPL-CCNC: 61 U/L (ref 39–117)
ALT SERPL-CCNC: 13 U/L (ref 1–33)
AST SERPL-CCNC: 13 U/L (ref 1–32)
BILIRUB SERPL-MCNC: 0.2 MG/DL (ref 0–1.2)
BUN SERPL-MCNC: 12 MG/DL (ref 8–23)
BUN/CREAT SERPL: 18.2 (ref 7–25)
CALCIUM SERPL-MCNC: 9.8 MG/DL (ref 8.6–10.5)
CHLORIDE SERPL-SCNC: 102 MMOL/L (ref 98–107)
CHOLEST SERPL-MCNC: 119 MG/DL (ref 0–200)
CHOLEST/HDLC SERPL: 2.25 {RATIO}
CO2 SERPL-SCNC: 27.7 MMOL/L (ref 22–29)
CREAT SERPL-MCNC: 0.66 MG/DL (ref 0.57–1)
GLOBULIN SER CALC-MCNC: 2.4 GM/DL
GLUCOSE SERPL-MCNC: 112 MG/DL (ref 65–99)
HBA1C MFR BLD: 5.9 % (ref 4.8–5.6)
HDLC SERPL-MCNC: 53 MG/DL (ref 40–60)
LDLC SERPL CALC-MCNC: 52 MG/DL (ref 0–100)
POTASSIUM SERPL-SCNC: 4.3 MMOL/L (ref 3.5–5.2)
PROT SERPL-MCNC: 7 G/DL (ref 6–8.5)
SODIUM SERPL-SCNC: 140 MMOL/L (ref 136–145)
TRIGL SERPL-MCNC: 68 MG/DL (ref 0–150)
VLDLC SERPL CALC-MCNC: 13.6 MG/DL

## 2020-07-08 PROCEDURE — 99214 OFFICE O/P EST MOD 30 MIN: CPT | Performed by: INTERNAL MEDICINE

## 2020-07-08 NOTE — ASSESSMENT & PLAN NOTE
Continue Ozempic 0.5 mg weekly, pioglitazone 15, and metformin  mg 2 BID.   Check A1c today.     Lab Results   Component Value Date    HGBA1C 6.40 (H) 02/05/2020    HGBA1C 6.30 (H) 09/16/2019    HGBA1C 6.5 (H) 06/06/2019

## 2020-07-08 NOTE — PROGRESS NOTES
[facility] [place] [facility]  HERNANDO LÓPEZ M.D.      Kimi Dominguez / 70 y.o. / female  07/08/2020      ASSESSMENT & PLAN:    Problem List Items Addressed This Visit        High    Type 2 diabetes mellitus with renal complication (CMS/HCC) (Chronic)    Overview     +microalbuminuria (5/2018)         Current Assessment & Plan     Continue Ozempic 0.5 mg weekly, pioglitazone 15, and metformin  mg 2 BID.   Check A1c today.     Lab Results   Component Value Date    HGBA1C 6.40 (H) 02/05/2020    HGBA1C 6.30 (H) 09/16/2019    HGBA1C 6.5 (H) 06/06/2019             Relevant Medications    pioglitazone (ACTOS) 15 MG tablet    OZEMPIC, 0.25 OR 0.5 MG/DOSE, 2 MG/1.5ML solution pen-injector    lisinopril (PRINIVIL,ZESTRIL) 20 MG tablet    metFORMIN ER (GLUCOPHAGE-XR) 500 MG 24 hr tablet    Other Relevant Orders    Hemoglobin A1c       Medium    Hyperlipidemia (Chronic)    Current Assessment & Plan     Continue simvastatin 40 mg daily.          Relevant Medications    Omega-3 Fatty Acids (FISH OIL) 1000 MG capsule capsule    simvastatin (ZOCOR) 40 MG tablet    Other Relevant Orders    Lipid Panel With / Chol / HDL Ratio    Comprehensive Metabolic Panel    Hypertension - Primary (Chronic)    Current Assessment & Plan     Home blood pressure is < 130/80. Continue lisinopril 20 mg and carvedilol 3.125 mg BID.          Relevant Medications    lisinopril (PRINIVIL,ZESTRIL) 20 MG tablet    carvedilol (COREG) 3.125 MG tablet        Orders Placed This Encounter   Procedures   • Hemoglobin A1c   • Lipid Panel With / Chol / HDL Ratio   • Comprehensive Metabolic Panel     No orders of the defined types were placed in this encounter.      Summary/Discussion:  Advance Care Planning   ACP discussion was held with the patient during this visit. Patient has an advance directive (not in EMR), copy requested.      Next Appointment with me: Visit date not found    Return in about 4 months (around 11/8/2020) for Diabetes, Hypertension, Hyperlipidemia,  "Schedule AWV with DR. LÓPEZ for next year.  ____________________________________________________________________    MEDICATIONS  Current Outpatient Medications   Medication Sig Dispense Refill   • carvedilol (COREG) 3.125 MG tablet TAKE ONE TABLET BY MOUTH TWICE A DAY WITH MEALS 180 tablet 3   • cyancobalamin (VITAMIN B-12) 100 MCG tablet Take  by mouth daily. As directed     • GLUCOSAMINE-CHONDROITIN PO Take 1 tablet by mouth.     • lisinopril (PRINIVIL,ZESTRIL) 20 MG tablet TAKE ONE TABLET BY MOUTH DAILY 90 tablet 3   • Melatonin 10 MG tablet Take 10 mg by mouth.     • metFORMIN ER (GLUCOPHAGE-XR) 500 MG 24 hr tablet TAKE TWO TABLETS BY MOUTH TWICE A  tablet 3   • Multiple Vitamins-Minerals (MULTIVITAMIN ADULT PO) Take 1 tablet by mouth Daily.     • Multiple Vitamins-Minerals (OCUVITE ADULT 50+) capsule Take 1 capsule by mouth daily.     • MYRBETRIQ 50 MG tablet sustained-release 24 hour 24 hr tablet Daily.     • Omega-3 Fatty Acids (FISH OIL) 1000 MG capsule capsule Take 3 capsules by mouth daily.     • OZEMPIC, 0.25 OR 0.5 MG/DOSE, 2 MG/1.5ML solution pen-injector Inject 0.5 mg under the skin into the appropriate area as directed 1 (One) Time Per Week. 1.5 mL 11   • pioglitazone (ACTOS) 15 MG tablet Take 1 tablet by mouth Daily. In the am 90 tablet 3   • PREMARIN 0.625 MG/GM vaginal cream Insert 0.5 g into the vagina Daily.     • simvastatin (ZOCOR) 40 MG tablet TAKE ONE TABLET BY MOUTH ONCE NIGHTLY 90 tablet 3     No current facility-administered medications for this visit.        VITALS    Visit Vitals  /70   Pulse 84   Temp 97.6 °F (36.4 °C)   Ht 155.6 cm (61.26\")   Wt 74.4 kg (164 lb)   SpO2 99%   BMI 30.73 kg/m²       BP Readings from Last 3 Encounters:   07/08/20 140/70   02/05/20 140/70   10/25/19 148/84     Wt Readings from Last 3 Encounters:   07/08/20 74.4 kg (164 lb)   02/05/20 73.5 kg (162 lb)   10/25/19 76.2 kg (168 lb)    Body mass index is 30.73 kg/m².    CC:  Main reason(s) for today's " visit: Follow-up for diabetes and related medical problems    HPI:     Chronic type 2 diabetes:  Diabetic complications: microalbuminuria. Current therapy: metformin/metformin ER, pioglitazone and Ozempic.  Most recent change to treatment plan: no recent change.  Home blood sugar levels: consistently in acceptable range.   Most recent relevant labs:   Lab Results   Component Value Date    HGBA1C 6.40 (H) 02/05/2020    HGBA1C 6.30 (H) 09/16/2019    HGBA1C 6.5 (H) 06/06/2019    CREATININE 0.59 02/05/2020    LDL 51 06/06/2019    MICROALBUR <3.0 06/06/2019       Chronic essential hypertension:  Since prior visit: compliant with medication(s), checks blood pressure occasionally, denies significant problems with medication(s) and SBP < 130.  Most recent in-office blood pressure readings:   BP Readings from Last 3 Encounters:   07/08/20 140/70   02/05/20 140/70   10/25/19 148/84       Chronic hyperlipidemia:  Current therapy include simvastatin, denies problems with medication.    Most recent labs:   Lab Results   Component Value Date    LDL 51 06/06/2019    LDL 51 05/07/2018    LDL 52 05/30/2017    HDL 57 06/06/2019    HDL 49 05/07/2018    HDL 58 05/30/2017    TRIG 65 06/06/2019    CHOLHDLRATIO 2.1 06/06/2019    CHOLHDLRATIO 2.76 05/07/2018    CHOLHDLRATIO 2.36 05/30/2017          Patient Care Team:  Nura Leal MD as PCP - General (Internal Medicine)  Lucas Schultz MD as Consulting Physician (Obstetrics and Gynecology)  Jess Vo MD as Consulting Physician (Dermatology)  ____________________________________________________________________    REVIEW OF SYSTEMS    Review of Systems  As noted per HPI  Constitutional neg  Resp neg  CV neg      PHYSICAL EXAMINATION    Physical Exam  Constitutional  No distress  Cardiovascular Rate  normal . Rhythm: regular . Heart sounds:  Normal  Pulmonary/Chest: Effort normal and breath sounds normal.    Psychiatric  Alert. Judgment and thought content normal. Mood  normal    REVIEWED DATA:    Labs:   Lab Results   Component Value Date     02/05/2020    K 4.5 02/05/2020    CALCIUM 9.9 02/05/2020    AST 18 02/05/2020    ALT 18 02/05/2020    BUN 16 02/05/2020    CREATININE 0.59 02/05/2020    CREATININE 0.66 01/03/2019    CREATININE 0.62 05/07/2018    EGFRIFNONA 101 02/05/2020    EGFRIFAFRI 123 02/05/2020       Lab Results   Component Value Date    HGBA1C 6.40 (H) 02/05/2020    HGBA1C 6.30 (H) 09/16/2019    HGBA1C 6.5 (H) 06/06/2019    MICROALBUR <3.0 06/06/2019       Lab Results   Component Value Date    LDL 51 06/06/2019    LDL 51 05/07/2018    LDL 52 05/30/2017    HDL 57 06/06/2019    HDL 49 05/07/2018    TRIG 65 06/06/2019    CHOLHDLRATIO 2.1 06/06/2019       Lab Results   Component Value Date    TSH 1.450 05/07/2018    FREET4 1.38 05/07/2018          Lab Results   Component Value Date    WBC 8.71 05/03/2018    HGB 10.9 (L) 05/03/2018    HGB 11.9 08/13/2015     05/03/2018        Lab Results   Component Value Date    PROTEIN  08/13/2015      Comment:      Negative    GLUCOSEU  08/13/2015      Comment:      Negative    LEUKOCYTESUR Negative 11/16/2018       Imaging:          Medical Tests:          Summary of old records / correspondence / consultant report:          Request outside records:          ALLERGIES  No Known Allergies     PFSH:     The following portions of the patient's history were reviewed and updated as appropriate: Allergies / Current Medications / Past Medical History / Surgical History / Social History / Family History    PROBLEM LIST   Patient Active Problem List   Diagnosis   • Hyperlipidemia   • Hypertension   • Obesity (BMI 30-39.9)   • Primary osteoarthritis of both hips   • Type 2 diabetes mellitus with renal complication (CMS/HCC)   • Cobalamin deficiency   • Screening for colon cancer   • Functional diarrhea   • Inflammation of colonic mucosa   • Lymphocytic colitis       PAST MEDICAL HISTORY  Past Medical History:   Diagnosis Date   • B12  deficiency    • Diabetes mellitus (CMS/HCC)    • Hyperlipidemia    • Hypertension    • Loose stools    • Obesity    • Rectocele 05/02/2018   • Shingles     left eye   • Urinary incontinence        SURGICAL HISTORY  Past Surgical History:   Procedure Laterality Date   • COLONOSCOPY  2008   • COLONOSCOPY N/A 10/4/2018    Procedure: COLONOSCOPY to Cecum WITH BIOPSY;  Surgeon: Geovanna Rebollar MD;  Location: [facility] ENDOSCOPY;  Service: General   • KNEE ARTHROSCOPY     • PAP SMEAR     • POSTERIOR VAGINAL REPAIR  05/02/2018    for rectocele   • SKIN CANCER DESTRUCTION      basal cells removed on nose bridge, left thigh squamous removed   • TUBAL ABDOMINAL LIGATION         SOCIAL HISTORY  Social History     Socioeconomic History   • Marital status:      Spouse name: Avtar Srinivasan)   • Number of children: Not on file   • Years of education: Not on file   • Highest education level: Not on file   Tobacco Use   • Smoking status: Never Smoker   • Smokeless tobacco: Never Used   • Tobacco comment: seldom    Substance and Sexual Activity   • Alcohol use: No   • Drug use: No       FAMILY HISTORY  Family History   Problem Relation Age of Onset   • Coronary artery disease Father    • Prostate cancer Father    • Breast cancer Maternal Aunt    • Diabetes Paternal Grandmother         Type 2   • Cancer Other    • Heart disease Other    • Thyroid cancer Neg Hx          **Dragon Disclaimer:   Much of this encounter note is an electronic transcription/translation of spoken language to printed text. The electronic translation of spoken language may permit erroneous, or at times, nonsensical words or phrases to be inadvertently transcribed. Although I have reviewed the note for such errors, some may still exist.       Template created by Jarrod Leal MD

## 2020-07-10 NOTE — PROGRESS NOTES
Nate:    Kimi, here are the result(s) of your test(s):     Diabetes and cholesterol are well controlled.   A1c for average glucose level is improved to 5.9 (from 6.4).     Please do not hesitate to contact me if you have questions.

## 2020-09-22 DIAGNOSIS — E11.29 TYPE 2 DIABETES MELLITUS WITH MICROALBUMINURIA, WITHOUT LONG-TERM CURRENT USE OF INSULIN (HCC): Chronic | ICD-10-CM

## 2020-09-22 DIAGNOSIS — R80.9 TYPE 2 DIABETES MELLITUS WITH MICROALBUMINURIA, WITHOUT LONG-TERM CURRENT USE OF INSULIN (HCC): Chronic | ICD-10-CM

## 2020-09-22 RX ORDER — PIOGLITAZONEHYDROCHLORIDE 15 MG/1
TABLET ORAL
Qty: 90 TABLET | Refills: 3 | Status: SHIPPED | OUTPATIENT
Start: 2020-09-22 | End: 2021-09-10

## 2020-10-13 DIAGNOSIS — E78.2 MIXED HYPERLIPIDEMIA: ICD-10-CM

## 2020-10-14 RX ORDER — SIMVASTATIN 40 MG
TABLET ORAL
Qty: 90 TABLET | Refills: 3 | Status: SHIPPED | OUTPATIENT
Start: 2020-10-14 | End: 2021-10-04

## 2020-11-06 DIAGNOSIS — R80.9 TYPE 2 DIABETES MELLITUS WITH MICROALBUMINURIA, WITHOUT LONG-TERM CURRENT USE OF INSULIN (HCC): Chronic | ICD-10-CM

## 2020-11-06 DIAGNOSIS — E11.29 TYPE 2 DIABETES MELLITUS WITH MICROALBUMINURIA, WITHOUT LONG-TERM CURRENT USE OF INSULIN (HCC): Chronic | ICD-10-CM

## 2020-11-06 RX ORDER — SEMAGLUTIDE 1.34 MG/ML
0.5 INJECTION, SOLUTION SUBCUTANEOUS WEEKLY
Qty: 1.5 ML | Refills: 11 | Status: SHIPPED | OUTPATIENT
Start: 2020-11-06 | End: 2020-11-09

## 2020-11-07 DIAGNOSIS — R80.9 TYPE 2 DIABETES MELLITUS WITH MICROALBUMINURIA, WITHOUT LONG-TERM CURRENT USE OF INSULIN (HCC): Chronic | ICD-10-CM

## 2020-11-07 DIAGNOSIS — I10 ESSENTIAL HYPERTENSION: Chronic | ICD-10-CM

## 2020-11-07 DIAGNOSIS — E11.9 TYPE 2 DIABETES MELLITUS WITHOUT COMPLICATION, WITHOUT LONG-TERM CURRENT USE OF INSULIN (HCC): ICD-10-CM

## 2020-11-07 DIAGNOSIS — E11.29 TYPE 2 DIABETES MELLITUS WITH MICROALBUMINURIA, WITHOUT LONG-TERM CURRENT USE OF INSULIN (HCC): Chronic | ICD-10-CM

## 2020-11-09 RX ORDER — SEMAGLUTIDE 1.34 MG/ML
INJECTION, SOLUTION SUBCUTANEOUS
Qty: 1 PEN | Refills: 11 | Status: SHIPPED | OUTPATIENT
Start: 2020-11-09 | End: 2021-11-24 | Stop reason: SDUPTHER

## 2020-11-09 RX ORDER — LISINOPRIL 20 MG/1
TABLET ORAL
Qty: 90 TABLET | Refills: 3 | Status: SHIPPED | OUTPATIENT
Start: 2020-11-09 | End: 2020-11-11

## 2020-11-11 ENCOUNTER — TELEPHONE (OUTPATIENT)
Dept: INTERNAL MEDICINE | Age: 70
End: 2020-11-11

## 2020-11-11 ENCOUNTER — OFFICE VISIT (OUTPATIENT)
Dept: INTERNAL MEDICINE | Age: 70
End: 2020-11-11

## 2020-11-11 VITALS
HEART RATE: 82 BPM | DIASTOLIC BLOOD PRESSURE: 68 MMHG | OXYGEN SATURATION: 97 % | WEIGHT: 168 LBS | HEIGHT: 61 IN | TEMPERATURE: 97.7 F | BODY MASS INDEX: 31.72 KG/M2 | SYSTOLIC BLOOD PRESSURE: 142 MMHG

## 2020-11-11 DIAGNOSIS — E78.2 MIXED HYPERLIPIDEMIA: Chronic | ICD-10-CM

## 2020-11-11 DIAGNOSIS — E11.29 TYPE 2 DIABETES MELLITUS WITH MICROALBUMINURIA, WITHOUT LONG-TERM CURRENT USE OF INSULIN (HCC): Primary | Chronic | ICD-10-CM

## 2020-11-11 DIAGNOSIS — I10 ESSENTIAL HYPERTENSION: Chronic | ICD-10-CM

## 2020-11-11 DIAGNOSIS — R80.9 TYPE 2 DIABETES MELLITUS WITH MICROALBUMINURIA, WITHOUT LONG-TERM CURRENT USE OF INSULIN (HCC): Primary | Chronic | ICD-10-CM

## 2020-11-11 LAB — HBA1C MFR BLD: 6.2 %

## 2020-11-11 PROCEDURE — 83036 HEMOGLOBIN GLYCOSYLATED A1C: CPT | Performed by: INTERNAL MEDICINE

## 2020-11-11 PROCEDURE — 99214 OFFICE O/P EST MOD 30 MIN: CPT | Performed by: INTERNAL MEDICINE

## 2020-11-11 PROCEDURE — 90694 VACC AIIV4 NO PRSRV 0.5ML IM: CPT | Performed by: INTERNAL MEDICINE

## 2020-11-11 PROCEDURE — G0008 ADMIN INFLUENZA VIRUS VAC: HCPCS | Performed by: INTERNAL MEDICINE

## 2020-11-11 RX ORDER — LISINOPRIL 30 MG/1
30 TABLET ORAL DAILY
Qty: 90 TABLET | Refills: 3 | Status: SHIPPED | OUTPATIENT
Start: 2020-11-11 | End: 2022-01-27 | Stop reason: SDUPTHER

## 2020-11-11 NOTE — ASSESSMENT & PLAN NOTE
Suboptimal. Increase lisinopril to 30 mg qd.   BP Readings from Last 3 Encounters:   11/11/20 142/68   07/08/20 140/70   02/05/20 140/70

## 2020-11-11 NOTE — TELEPHONE ENCOUNTER
Informed pt poct A1c result 6.2%  Per dr cornelius verbal  A1c level went slightly up .watch sugar and diet better.

## 2020-11-11 NOTE — PROGRESS NOTES
[facility] [place]  HERNANDO LÓPEZ M.D.      Kimi LAURA Alberto / 70 y.o. / female  11/11/2020    CHIEF COMPLAINT     Diabetes, Hypertension, and Hyperlipidemia      ASSESSMENT & PLAN     Problem List Items Addressed This Visit        High    Type 2 diabetes mellitus with renal complication (CMS/HCC) - Primary (Chronic)    Overview     Complications: +microalbuminuria (5/2018)         Current Assessment & Plan     Continue Ozempic 0.5 mg, metformin  mg 2 BID, and pioglitazone 15 mg.          Relevant Medications    metFORMIN ER (GLUCOPHAGE-XR) 500 MG 24 hr tablet    pioglitazone (ACTOS) 15 MG tablet    Ozempic, 0.25 or 0.5 MG/DOSE, 2 MG/1.5ML solution pen-injector    lisinopril (PRINIVIL,ZESTRIL) 30 MG tablet    Other Relevant Orders    POC Glycosylated Hemoglobin (Hb A1C)       Medium    Hyperlipidemia (Chronic)    Current Assessment & Plan     Stable. Continue simvastatin 40 mg.          Relevant Medications    Omega-3 Fatty Acids (FISH OIL) 1000 MG capsule capsule    simvastatin (ZOCOR) 40 MG tablet    Hypertension (Chronic)    Current Assessment & Plan     Suboptimal. Increase lisinopril to 30 mg qd.   BP Readings from Last 3 Encounters:   11/11/20 142/68   07/08/20 140/70   02/05/20 140/70             Relevant Medications    carvedilol (COREG) 3.125 MG tablet    lisinopril (PRINIVIL,ZESTRIL) 30 MG tablet        Orders Placed This Encounter   Procedures   • Fluad Quad 65+ yrs (1671-8699)   • POC Glycosylated Hemoglobin (Hb A1C)     New Medications Ordered This Visit   Medications   • lisinopril (PRINIVIL,ZESTRIL) 30 MG tablet     Sig: Take 1 tablet by mouth Daily.     Dispense:  90 tablet     Refill:  3       Summary/Discussion:  •     Next Appointment with me: 2/8/2021    Return in about 6 months (around 5/11/2021) for Reassess chronic medical problems.    _____________________________________________________________________________________    VITAL SIGNS     Visit Vitals  /68   Pulse 82   Temp 97.7 °F  "(36.5 °C)   Ht 155.6 cm (61.26\")   Wt 76.2 kg (168 lb)   SpO2 97%   BMI 31.47 kg/m²       BP Readings from Last 3 Encounters:   11/11/20 142/68   07/08/20 140/70   02/05/20 140/70     Wt Readings from Last 3 Encounters:   11/11/20 76.2 kg (168 lb)   07/08/20 74.4 kg (164 lb)   02/05/20 73.5 kg (162 lb)     Body mass index is 31.47 kg/m².      MEDICATIONS     Current Outpatient Medications   Medication Sig Dispense Refill   • carvedilol (COREG) 3.125 MG tablet TAKE ONE TABLET BY MOUTH TWICE A DAY WITH MEALS 180 tablet 3   • cyancobalamin (VITAMIN B-12) 100 MCG tablet Take  by mouth daily. As directed     • GLUCOSAMINE-CHONDROITIN PO Take 1 tablet by mouth.     • lisinopril (PRINIVIL,ZESTRIL) 20 MG tablet TAKE ONE TABLET BY MOUTH DAILY 90 tablet 3   • Melatonin 10 MG tablet Take 10 mg by mouth.     • metFORMIN ER (GLUCOPHAGE-XR) 500 MG 24 hr tablet TAKE TWO TABLETS BY MOUTH TWICE A  tablet 3   • Multiple Vitamins-Minerals (MULTIVITAMIN ADULT PO) Take 1 tablet by mouth Daily.     • Multiple Vitamins-Minerals (OCUVITE ADULT 50+) capsule Take 1 capsule by mouth daily.     • MYRBETRIQ 50 MG tablet sustained-release 24 hour 24 hr tablet Daily.     • Omega-3 Fatty Acids (FISH OIL) 1000 MG capsule capsule Take 3 capsules by mouth daily.     • Ozempic, 0.25 or 0.5 MG/DOSE, 2 MG/1.5ML solution pen-injector INJECT 0.5 MG UNDER THE SKIN INTO THE APPROPRIATE AREA AS DIRECTED ONE TIME PER WEEK 1 pen 11   • pioglitazone (ACTOS) 15 MG tablet TAKE ONE TABLET BY MOUTH EVERY MORNING 90 tablet 3   • PREMARIN 0.625 MG/GM vaginal cream Insert 0.5 g into the vagina Daily.     • simvastatin (ZOCOR) 40 MG tablet TAKE ONE TABLET BY MOUTH ONCE NIGHTLY 90 tablet 3     No current facility-administered medications for this visit.        _____________________________________________________________________________________    HISTORY OF PRESENT ILLNESS     DM 2: home sugars are stable. Hypertension SBP > 130's.  Hyperlipidemia on simvastatin " 40 without problems.       Patient Care Team:  Nura Leal MD as PCP - General (Internal Medicine)  Lucas Schultz MD as Consulting Physician (Obstetrics and Gynecology)  Jess Vo MD as Consulting Physician (Dermatology)      REVIEW OF SYSTEMS     Review of Systems  Mild wt gain  No chest pain or shortness of breath   No nausea/vomiting or abdominal pain       PHYSICAL EXAMINATION     Physical Exam  Obese   Cardiovascular: Normal rate, regular rhythm and normal heart so     REVIEWED DATA     Labs:     Lab Results   Component Value Date     07/08/2020    K 4.3 07/08/2020    CALCIUM 9.8 07/08/2020    AST 13 07/08/2020    ALT 13 07/08/2020    BUN 12 07/08/2020    CREATININE 0.66 07/08/2020    CREATININE 0.59 02/05/2020    CREATININE 0.66 01/03/2019    EGFRIFNONA 89 07/08/2020    EGFRIFAFRI 108 07/08/2020       Lab Results   Component Value Date    HGBA1C 5.90 (H) 07/08/2020    HGBA1C 6.40 (H) 02/05/2020    HGBA1C 6.30 (H) 09/16/2019     (H) 07/08/2020     (H) 02/05/2020     (H) 01/03/2019    MICROALBUR <3.0 06/06/2019       Lab Results   Component Value Date    LDL 52 07/08/2020    LDL 51 06/06/2019    LDL 51 05/07/2018    HDL 53 07/08/2020    HDL 57 06/06/2019    HDL 49 05/07/2018    TRIG 68 07/08/2020    TRIG 65 06/06/2019    TRIG 176 (H) 05/07/2018    CHOLHDLRATIO 2.25 07/08/2020    CHOLHDLRATIO 2.1 06/06/2019    CHOLHDLRATIO 2.76 05/07/2018       Lab Results   Component Value Date    TSH 1.450 05/07/2018    FREET4 1.38 05/07/2018       Lab Results   Component Value Date    WBC 8.71 05/03/2018    HGB 10.9 (L) 05/03/2018    HGB 11.9 08/13/2015     05/03/2018       Lab Results   Component Value Date    PROTEIN  08/13/2015      Comment:      Negative    GLUCOSEU  08/13/2015      Comment:      Negative    LEUKOCYTESUR Negative 11/16/2018       Imaging:         Medical Tests:         Summary of old records / correspondence / consultant report:         Request outside  records:         ALLERGIES  No Known Allergies     PFSH:     The following portions of the patient's history were reviewed and updated as appropriate: Allergies / Current Medications / Past Medical History / Surgical History / Social History / Family History    PROBLEM LIST   Patient Active Problem List   Diagnosis   • Hyperlipidemia   • Hypertension   • Obesity (BMI 30-39.9)   • Primary osteoarthritis of both hips   • Type 2 diabetes mellitus with renal complication (CMS/HCC)   • Cobalamin deficiency   • Functional diarrhea   • Inflammation of colonic mucosa   • Lymphocytic colitis       PAST MEDICAL HISTORY  Past Medical History:   Diagnosis Date   • B12 deficiency    • Diabetes mellitus (CMS/HCC)    • Hyperlipidemia    • Hypertension    • Loose stools    • Obesity    • Rectocele 05/02/2018   • Shingles     left eye   • Urinary incontinence        SURGICAL HISTORY  Past Surgical History:   Procedure Laterality Date   • COLONOSCOPY  2008   • COLONOSCOPY N/A 10/4/2018    Procedure: COLONOSCOPY to Cecum WITH BIOPSY;  Surgeon: Geovanna Rebollar MD;  Location: [facility] ENDOSCOPY;  Service: General   • KNEE ARTHROSCOPY     • PAP SMEAR     • POSTERIOR VAGINAL REPAIR  05/02/2018    for rectocele   • SKIN CANCER DESTRUCTION      basal cells removed on nose bridge, left thigh squamous removed   • TUBAL ABDOMINAL LIGATION         SOCIAL HISTORY  Social History     Socioeconomic History   • Marital status:      Spouse name: Avtar Srinivasan)   • Number of children: Not on file   • Years of education: Not on file   • Highest education level: Not on file   Tobacco Use   • Smoking status: Never Smoker   • Smokeless tobacco: Never Used   • Tobacco comment: seldom    Substance and Sexual Activity   • Alcohol use: No   • Drug use: No       FAMILY HISTORY  Family History   Problem Relation Age of Onset   • Coronary artery disease Father    • Prostate cancer Father    • Breast cancer Maternal Aunt    • Diabetes Paternal Grandmother          Type 2   • Cancer Other    • Heart disease Other    • Thyroid cancer Neg Hx          Examiner was wearing KN95 mask, face shield and exam gloves during the entire duration of the visit. Patient was masked the entire time.   Minimum social distance of 6 ft maintained entire visit except if physical contact was necessary as documented.     **Dragon Disclaimer:   Much of this encounter note is an electronic transcription/translation of spoken language to printed text. The electronic translation of spoken language may permit erroneous, or at times, nonsensical words or phrases to be inadvertently transcribed. Although I have reviewed the note for such errors, some may still exist.     Template created by Jarrod Leal MD

## 2020-12-03 DIAGNOSIS — I10 ESSENTIAL HYPERTENSION: Chronic | ICD-10-CM

## 2020-12-03 RX ORDER — CARVEDILOL 3.12 MG/1
TABLET ORAL
Qty: 180 TABLET | Refills: 3 | Status: SHIPPED | OUTPATIENT
Start: 2020-12-03 | End: 2021-12-02

## 2020-12-29 DIAGNOSIS — E11.9 TYPE 2 DIABETES MELLITUS WITHOUT COMPLICATION, WITHOUT LONG-TERM CURRENT USE OF INSULIN (HCC): Chronic | ICD-10-CM

## 2020-12-30 RX ORDER — METFORMIN HYDROCHLORIDE 500 MG/1
TABLET, EXTENDED RELEASE ORAL
Qty: 360 TABLET | Refills: 3 | Status: SHIPPED | OUTPATIENT
Start: 2020-12-30 | End: 2021-12-02

## 2021-03-19 ENCOUNTER — BULK ORDERING (OUTPATIENT)
Dept: CASE MANAGEMENT | Facility: OTHER | Age: 71
End: 2021-03-19

## 2021-03-19 DIAGNOSIS — Z23 IMMUNIZATION DUE: ICD-10-CM

## 2021-04-06 ENCOUNTER — OFFICE VISIT (OUTPATIENT)
Dept: INTERNAL MEDICINE | Age: 71
End: 2021-04-06

## 2021-04-06 VITALS
HEIGHT: 61 IN | SYSTOLIC BLOOD PRESSURE: 140 MMHG | BODY MASS INDEX: 30.78 KG/M2 | OXYGEN SATURATION: 99 % | DIASTOLIC BLOOD PRESSURE: 72 MMHG | TEMPERATURE: 96.9 F | HEART RATE: 93 BPM | WEIGHT: 163 LBS

## 2021-04-06 DIAGNOSIS — Z78.0 POSTMENOPAUSAL STATE: ICD-10-CM

## 2021-04-06 DIAGNOSIS — I10 ESSENTIAL HYPERTENSION: ICD-10-CM

## 2021-04-06 DIAGNOSIS — R80.9 TYPE 2 DIABETES MELLITUS WITH MICROALBUMINURIA, WITHOUT LONG-TERM CURRENT USE OF INSULIN (HCC): ICD-10-CM

## 2021-04-06 DIAGNOSIS — E78.2 MIXED HYPERLIPIDEMIA: ICD-10-CM

## 2021-04-06 DIAGNOSIS — N39.46 MIXED STRESS AND URGE URINARY INCONTINENCE: ICD-10-CM

## 2021-04-06 DIAGNOSIS — E11.29 TYPE 2 DIABETES MELLITUS WITH MICROALBUMINURIA, WITHOUT LONG-TERM CURRENT USE OF INSULIN (HCC): ICD-10-CM

## 2021-04-06 DIAGNOSIS — Z00.00 MEDICARE ANNUAL WELLNESS VISIT, INITIAL: Primary | ICD-10-CM

## 2021-04-06 DIAGNOSIS — M15.9 PRIMARY OSTEOARTHRITIS INVOLVING MULTIPLE JOINTS: Chronic | ICD-10-CM

## 2021-04-06 DIAGNOSIS — Z13.820 ENCOUNTER FOR SCREENING FOR OSTEOPOROSIS: ICD-10-CM

## 2021-04-06 PROBLEM — M19.90 DJD (DEGENERATIVE JOINT DISEASE): Status: RESOLVED | Noted: 2021-04-06 | Resolved: 2021-04-06

## 2021-04-06 PROBLEM — M19.90 DJD (DEGENERATIVE JOINT DISEASE): Status: ACTIVE | Noted: 2021-04-06

## 2021-04-06 PROCEDURE — G0438 PPPS, INITIAL VISIT: HCPCS | Performed by: INTERNAL MEDICINE

## 2021-04-06 PROCEDURE — 99214 OFFICE O/P EST MOD 30 MIN: CPT | Performed by: INTERNAL MEDICINE

## 2021-04-06 NOTE — ASSESSMENT & PLAN NOTE
Marginal control. Monitor home blood pressure readings.    Continue lisinopril 30 mg and carvedilol 3.125 mg BID.     BP Readings from Last 3 Encounters:   04/06/21 140/72   11/11/20 142/68   07/08/20 140/70

## 2021-04-06 NOTE — PROGRESS NOTES
"    I N T E R N A L  M E D I C I N E  J U N O H  K I M,  M D      ENCOUNTER DATE:  04/06/2021    Kimi Dominguez / 70 y.o. / female        MEDICARE ANNUAL WELLNESS VISIT       Chief Complaint: Medicare Wellness-Initial Visit       Patient's general assessment of her health since a year ago:     - Compared to one year ago, she feels her physical health is slightly worse.    - Compared to one year ago, she feels her mental health is about the same without significant change.      HPI for other active medical problems:     Complains of worsening urinary incontinence--mostly stress with component of urge.   Myrbetriq is not helping.     Arthritis pain of left knee is getting worse. Has pain of right elbow. No swelling or redness.     Diabetes remains stable on medications.   Does not check blood pressure at home.   Has not had COVID-19 vaccine.         * The required components of Health Risk Assessment (HRA) that were completed by the patient and/or my staff are contained within this note and in the scanned documents titled \"Health Risk Assessment\" within the media section of the patient's chart in [employer].         HISTORY       Recent Hospitalizations:    Recent hospitalization?:     If YES, location, date, and diagnoses:     · Location:   · Date:   · Principle Discharge Dx:   · Secondary Dx:       Patient Care Team:    Patient Care Team:  Nura Leal MD as PCP - General (Internal Medicine)  Lucas Schultz MD as Consulting Physician (Obstetrics and Gynecology)  Jess Vo MD as Consulting Physician (Dermatology)      Allergies:  Patient has no known allergies.    Medications:  Current Outpatient Medications on File Prior to Visit   Medication Sig Dispense Refill   • carvedilol (COREG) 3.125 MG tablet TAKE ONE TABLET BY MOUTH TWICE A DAY WITH MEALS 180 tablet 3   • cyancobalamin (VITAMIN B-12) 100 MCG tablet Take  by mouth daily. As directed     • GLUCOSAMINE-CHONDROITIN PO Take 1 tablet by mouth.     • " lisinopril (PRINIVIL,ZESTRIL) 30 MG tablet Take 1 tablet by mouth Daily. 90 tablet 3   • Melatonin 10 MG tablet Take 10 mg by mouth.     • metFORMIN ER (GLUCOPHAGE-XR) 500 MG 24 hr tablet TAKE TWO TABLETS BY MOUTH TWICE A  tablet 3   • Multiple Vitamins-Minerals (MULTIVITAMIN ADULT PO) Take 1 tablet by mouth Daily.     • Multiple Vitamins-Minerals (OCUVITE ADULT 50+) capsule Take 1 capsule by mouth daily.     • MYRBETRIQ 50 MG tablet sustained-release 24 hour 24 hr tablet Daily.     • Omega-3 Fatty Acids (FISH OIL) 1000 MG capsule capsule Take 3 capsules by mouth daily.     • Ozempic, 0.25 or 0.5 MG/DOSE, 2 MG/1.5ML solution pen-injector INJECT 0.5 MG UNDER THE SKIN INTO THE APPROPRIATE AREA AS DIRECTED ONE TIME PER WEEK 1 pen 11   • pioglitazone (ACTOS) 15 MG tablet TAKE ONE TABLET BY MOUTH EVERY MORNING 90 tablet 3   • simvastatin (ZOCOR) 40 MG tablet TAKE ONE TABLET BY MOUTH ONCE NIGHTLY 90 tablet 3   • PREMARIN 0.625 MG/GM vaginal cream Insert 0.5 g into the vagina Daily.     • [DISCONTINUED] pioglitazone (ACTOS) 15 MG tablet Take 1 tablet by mouth Daily. In the am 90 tablet 3     No current facility-administered medications on file prior to visit.        PFSH:     The following portions of the patient's history were reviewed and updated as appropriate: Allergies / Current Medications / Past Medical History / Surgical History / Social History / Family History    Problem List:  Patient Active Problem List   Diagnosis   • Hyperlipidemia   • Hypertension   • Obesity (BMI 30-39.9)   • Primary osteoarthritis involving multiple joints   • Type 2 diabetes mellitus with renal complication (CMS/HCC)   • Cobalamin deficiency   • Lymphocytic colitis   • Mixed stress and urge urinary incontinence       Past Medical History:  Past Medical History:   Diagnosis Date   • B12 deficiency    • Diabetes mellitus (CMS/HCC)    • Hyperlipidemia    • Hypertension    • Loose stools    • Obesity    • Rectocele 05/02/2018   •  "Shingles     left eye   • Skin cancer    • Urinary incontinence        Past Surgical History:  Past Surgical History:   Procedure Laterality Date   • COLONOSCOPY     • COLONOSCOPY N/A 10/4/2018    Procedure: COLONOSCOPY to Cecum WITH BIOPSY;  Surgeon: Geovanna Rebollar MD;  Location: [facility] ENDOSCOPY;  Service: General   • KNEE ARTHROSCOPY     • PAP SMEAR     • POSTERIOR VAGINAL REPAIR  2018    for rectocele   • SKIN CANCER DESTRUCTION      basal cells removed on nose bridge, left thigh squamous removed   • TUBAL ABDOMINAL LIGATION         Social History:  Social History     Socioeconomic History   • Marital status:      Spouse name: Avtar Srinivasan)   • Number of children: 3   • Years of education: Not on file   • Highest education level: Not on file   Tobacco Use   • Smoking status: Never Smoker   • Smokeless tobacco: Never Used   • Tobacco comment: seldom    Substance and Sexual Activity   • Alcohol use: No   • Drug use: No   • Sexual activity: Not Currently       Family History:  Family History   Problem Relation Age of Onset   • Coronary artery disease Father          82   • Prostate cancer Father    • Breast cancer Maternal Aunt    • Diabetes Paternal Grandmother         Type 2   • Stroke Mother    • No Known Problems Sister    • Thyroid cancer Neg Hx    • Colon cancer Neg Hx          PATIENT ASSESSMENT     Vitals:  /72 (BP Location: Left arm)   Pulse 93   Temp 96.9 °F (36.1 °C)   Ht 155.6 cm (61.26\")   Wt 73.9 kg (163 lb)   SpO2 99%   BMI 30.54 kg/m²   BP Readings from Last 3 Encounters:   21 140/72   20 142/68   20 140/70     Wt Readings from Last 3 Encounters:   21 73.9 kg (163 lb)   20 76.2 kg (168 lb)   20 74.4 kg (164 lb)      Body mass index is 30.54 kg/m².    There were no vitals filed for this visit.      Review of Systems:    Review of Systems  Constitutional neg except per HPI   Resp neg  CV neg   GI neg  Psych neg  Neuro neg "     Physical Exam:    Physical Exam  Feet:      Comments: Diabetic Foot Exam Performed and Monofilament Test Performed    Monofilament test is normal.       Overweight   Cardiovascular: Normal rate, regular rhythm. No carotid bruit.    Pulm/Chest: Effort normal, breath sounds normal.   Abdomen: Soft and nontender.       Reviewed Data:    Labs:   Lab Results   Component Value Date     07/08/2020    K 4.3 07/08/2020    CALCIUM 9.8 07/08/2020    AST 13 07/08/2020    ALT 13 07/08/2020    BUN 12 07/08/2020    CREATININE 0.66 07/08/2020    CREATININE 0.59 02/05/2020    CREATININE 0.66 01/03/2019    EGFRIFNONA 89 07/08/2020    EGFRIFAFRI 108 07/08/2020       Lab Results   Component Value Date     (H) 07/08/2020     (H) 02/05/2020     (H) 01/03/2019    HGBA1C 6.2 11/11/2020    HGBA1C 5.90 (H) 07/08/2020    HGBA1C 6.40 (H) 02/05/2020    MICROALBUR <3.0 06/06/2019       Lab Results   Component Value Date    LDL 52 07/08/2020    LDL 51 06/06/2019    LDL 51 05/07/2018    HDL 53 07/08/2020    TRIG 68 07/08/2020    CHOLHDLRATIO 2.25 07/08/2020       Lab Results   Component Value Date    TSH 1.450 05/07/2018    FREET4 1.38 05/07/2018          Lab Results   Component Value Date    WBC 8.71 05/03/2018    HGB 10.9 (L) 05/03/2018    HGB 11.9 08/13/2015     05/03/2018                 No results found for: PSA    Imaging:          Medical Tests:          Screening for Glaucoma:  Previous screening for glaucoma?: Yes      Hearing Loss Screen:  Finger Rub Hearing Test (right ear): passed  Finger Rub Hearing Test (left ear): passed      Urinary Incontinence Screen:  Episodes of urinary incontinence? : Yes      Depression Screen:  PHQ-2/PHQ-9 Depression Screening 4/6/2021   Little interest or pleasure in doing things 0   Feeling down, depressed, or hopeless 0   Total Score 0        PHQ-2: 0 (Not depressed)    PHQ-9: 0 (Negative screening for depression)       FUNCTIONAL, FALL RISK, & COGNITIVE SCREENING  (Components below):    DATA:    Functional & Cognitive Status 4/6/2021   Do you have difficulty preparing food and eating? No   Do you have difficulty bathing yourself, getting dressed or grooming yourself? No   Do you have difficulty using the toilet? No   Do you have difficulty moving around from place to place? No   Do you have trouble with steps or getting out of a bed or a chair? No   Current Diet Well Balanced Diet   Dental Exam Up to date   Eye Exam Up to date   Exercise (times per week) 4 times per week   Current Exercise Activities Include Cardiovasular Workout on Exercise Equipment   Do you need help using the phone?  No   Are you deaf or do you have serious difficulty hearing?  No   Do you need help with transportation? No   Do you need help shopping? No   Do you need help preparing meals?  No   Do you need help with housework?  No   Do you need help with laundry? No   Do you need help taking your medications? No   Do you need help managing money? No   Do you ever drive or ride in a car without wearing a seat belt? No   Do you have difficulty concentrating, remembering or making decisions? No         A) Assessment of Functional Ability:  (Assessment of ability to perform ADL's (showering/bathing, using toilet, dressing, feeding self, moving self around) and IADL's (use telephone, shop, prepare food, housekeep, do laundry, transport independently, take medications independently, and handle finances)    Degree of functional impairment: MILD (based on assessment noted above)      B) Assessment of Fall Risk:  Fall Risk Assessment was completed, and patient is at moderate risk for falls.       Need for further evaluation of gait, strength, and balance? : No    Timed Up and Go (TUG):   (>= 12 seconds indicates high risk for falling)    Observable abnormalities included: Normal gait pattern       C. Assessment of Cognitive Function:    Mini-Cog Test:     1) Registration (5 objects): Yes   2) Number of objects  recalled: 3   3) Clock Draw: Passed? : N/A       Further evaluation required? : No        COUNSELING       A. Identification of Health Risk Factors:    Risk factors include: cardiovascular risk factors, chronic pain and obesity      B. Age-Appropriate Screening Schedule:  (Refer to the list below for future screening recommendations based on patient's age, sex and/or medical conditions. Orders for these recommended tests are listed in the plan section. The patient has been provided with a written plan)    Health Maintenance Topics  Health Maintenance   Topic Date Due   • DIABETIC FOOT EXAM  05/07/2019   • URINE MICROALBUMIN  06/06/2020   • DXA SCAN  09/21/2020   • HEMOGLOBIN A1C  05/11/2021   • LIPID PANEL  07/08/2021   • INFLUENZA VACCINE  08/01/2021   • DIABETIC EYE EXAM  08/31/2021   • MAMMOGRAM  11/11/2021   • PAP SMEAR  11/11/2022   • TDAP/TD VACCINES (2 - Td) 06/04/2024   • COLONOSCOPY  10/04/2028   • ZOSTER VACCINE  Addressed       Health Maintenance Topics Due or Over-Due  Health Maintenance Due   Topic Date Due   • COVID-19 Vaccine (1) Never done   • DIABETIC FOOT EXAM  05/07/2019   • URINE MICROALBUMIN  06/06/2020   • DXA SCAN  09/21/2020         C. Advanced Care Planning:    Advance Care Planning   ACP discussion was held with the patient during this visit. Patient does not have an advance directive, information provided.       D. Patient Self-Management and Personalized Health Advice:    She has been provided with personalized counseling/information (including brochures/handouts) about:     -- optimizing diet/nutrition plans, improving exercise / conditioning, weight management, reducing risk for cardiovascular disease (heart, stroke, vascular), fall prevention and management of chronic pain with focus on minimizing use of narcotic pain medications      She has been recommended for the following preventative services which has been performed today, will be ordered today or ordered/performed on upcoming  follow-up visit:     -- nutrition counseling provided, exercise counseling provided, counseling for cardiovascular disease risk reduction, fall risk assessment / plan of care completed, urinary incontinence assessment done, screening for osteoporosis (DEXA ordered), vaccination for Shingrix administered  (or recommended at pharmacy), **vaccination recommendations provided for COVID-19      E. Miscellaneous Items:    -Aspirin use counseling: Does not need ASA (and currently is not on it)    -Discussed BMI with her. The BMI is above average; BMI management plan is completed (discussed plans for weight loss)    -Reviewed use of high risk medication in the elderly: YES    -Reviewed for potential of harmful drug interactions in the elderly: YES        WRAP UP       Assessment & Plan:    1) MEDICARE ANNUAL WELLNESS VISIT    2) OTHER MEDICAL CONDITIONS ADDRESSED TODAY:            Problem List Items Addressed This Visit        High    Type 2 diabetes mellitus with renal complication (CMS/[facility]) (Chronic)    Overview     Complications: +microalbuminuria (5/2018)    Continue Ozempic 0.5 mg, metformin  mg 2 BID, and pioglitazone 15 mg.          Relevant Medications    pioglitazone (ACTOS) 15 MG tablet    Ozempic, 0.25 or 0.5 MG/DOSE, 2 MG/1.5ML solution pen-injector    lisinopril (PRINIVIL,ZESTRIL) 30 MG tablet    metFORMIN ER (GLUCOPHAGE-XR) 500 MG 24 hr tablet    Other Relevant Orders    Hemoglobin A1c    Microalbumin / Creatinine Urine Ratio - Urine, Clean Catch    Comprehensive Metabolic Panel    CBC & Differential       Medium    Hyperlipidemia (Chronic)    Overview     Continue simvastatin 40 mg daily.          Relevant Medications    Omega-3 Fatty Acids (FISH OIL) 1000 MG capsule capsule    simvastatin (ZOCOR) 40 MG tablet    Other Relevant Orders    Lipid Panel With / Chol / HDL Ratio    Comprehensive Metabolic Panel    Hypertension (Chronic)    Current Assessment & Plan     Marginal control. Monitor home blood  pressure readings.    Continue lisinopril 30 mg and carvedilol 3.125 mg BID.     BP Readings from Last 3 Encounters:   04/06/21 140/72   11/11/20 142/68   07/08/20 140/70             Relevant Medications    lisinopril (PRINIVIL,ZESTRIL) 30 MG tablet    carvedilol (COREG) 3.125 MG tablet    Other Relevant Orders    TSH+Free T4    CBC & Differential    Primary osteoarthritis involving multiple joints (Chronic)    Overview     Bilateral hips and left knee         Current Assessment & Plan     Recommended Voltaren 1% gel BID PRN.             Low    Mixed stress and urge urinary incontinence (Chronic)    Current Assessment & Plan     Referral to gynecological urologist.          Relevant Orders    Ambulatory Referral to Gynecologic Urology      Other Visit Diagnoses     Medicare annual wellness visit, initial    -  Primary    Postmenopausal state        Relevant Orders    DEXA Bone Density Axial    Encounter for screening for osteoporosis        Relevant Orders    DEXA Bone Density Axial                    Orders Placed This Encounter   Procedures   • DEXA Bone Density Axial   • Hemoglobin A1c   • Microalbumin / Creatinine Urine Ratio - Urine, Clean Catch   • Lipid Panel With / Chol / HDL Ratio   • Comprehensive Metabolic Panel   • TSH+Free T4   • Ambulatory Referral to Gynecologic Urology   • CBC & Differential       Discussion / Summary:        Medications as of TODAY:              Current Outpatient Medications   Medication Sig Dispense Refill   • carvedilol (COREG) 3.125 MG tablet TAKE ONE TABLET BY MOUTH TWICE A DAY WITH MEALS 180 tablet 3   • cyancobalamin (VITAMIN B-12) 100 MCG tablet Take  by mouth daily. As directed     • GLUCOSAMINE-CHONDROITIN PO Take 1 tablet by mouth.     • lisinopril (PRINIVIL,ZESTRIL) 30 MG tablet Take 1 tablet by mouth Daily. 90 tablet 3   • Melatonin 10 MG tablet Take 10 mg by mouth.     • metFORMIN ER (GLUCOPHAGE-XR) 500 MG 24 hr tablet TAKE TWO TABLETS BY MOUTH TWICE A  tablet 3    • Multiple Vitamins-Minerals (MULTIVITAMIN ADULT PO) Take 1 tablet by mouth Daily.     • Multiple Vitamins-Minerals (OCUVITE ADULT 50+) capsule Take 1 capsule by mouth daily.     • MYRBETRIQ 50 MG tablet sustained-release 24 hour 24 hr tablet Daily.     • Omega-3 Fatty Acids (FISH OIL) 1000 MG capsule capsule Take 3 capsules by mouth daily.     • Ozempic, 0.25 or 0.5 MG/DOSE, 2 MG/1.5ML solution pen-injector INJECT 0.5 MG UNDER THE SKIN INTO THE APPROPRIATE AREA AS DIRECTED ONE TIME PER WEEK 1 pen 11   • pioglitazone (ACTOS) 15 MG tablet TAKE ONE TABLET BY MOUTH EVERY MORNING 90 tablet 3   • simvastatin (ZOCOR) 40 MG tablet TAKE ONE TABLET BY MOUTH ONCE NIGHTLY 90 tablet 3   • PREMARIN 0.625 MG/GM vaginal cream Insert 0.5 g into the vagina Daily.       No current facility-administered medications for this visit.         FOLLOW-UP:            Return in about 4 months (around 8/6/2021) for Reassess chronic medical problems.                 Future Appointments   Date Time Provider Department Center   8/13/2021  8:00 AM Nura Leal MD [facility]           After Visit Summary (AVS) including the Personalized Prevention  Plan Services (PPPS) was either printed and given to the patient at check-out today and/or sent to [facility] for review.       *Examiner was wearing KN95 mask, face shield and exam gloves during the entire duration of the visit. Patient was masked the entire time.   Minimum social distance of 6 ft maintained entire visit except if physical contact was necessary as documented.      **Dragon Disclaimer:   Much of this encounter note is an electronic transcription/translation of spoken language to printed text. The electronic translation of spoken language may permit erroneous, or at times, nonsensical words or phrases to be inadvertently transcribed. Although I have reviewed the note for such errors, some may still exist.     This template was created by Jarrod Leal MD.

## 2021-04-07 LAB
ALBUMIN SERPL-MCNC: 4.7 G/DL (ref 3.5–5.2)
ALBUMIN/CREAT UR: 12 MG/G CREAT (ref 0–29)
ALBUMIN/GLOB SERPL: 2 G/DL
ALP SERPL-CCNC: 60 U/L (ref 39–117)
ALT SERPL-CCNC: 14 U/L (ref 1–33)
AST SERPL-CCNC: 13 U/L (ref 1–32)
BASOPHILS # BLD AUTO: 0.03 10*3/MM3 (ref 0–0.2)
BASOPHILS NFR BLD AUTO: 0.5 % (ref 0–1.5)
BILIRUB SERPL-MCNC: 0.4 MG/DL (ref 0–1.2)
BUN SERPL-MCNC: 16 MG/DL (ref 8–23)
BUN/CREAT SERPL: 25.8 (ref 7–25)
CALCIUM SERPL-MCNC: 10.1 MG/DL (ref 8.6–10.5)
CHLORIDE SERPL-SCNC: 102 MMOL/L (ref 98–107)
CHOLEST SERPL-MCNC: 107 MG/DL (ref 0–200)
CHOLEST/HDLC SERPL: 2.06 {RATIO}
CO2 SERPL-SCNC: 28.6 MMOL/L (ref 22–29)
CREAT SERPL-MCNC: 0.62 MG/DL (ref 0.57–1)
CREAT UR-MCNC: 76.1 MG/DL
EOSINOPHIL # BLD AUTO: 0.07 10*3/MM3 (ref 0–0.4)
EOSINOPHIL NFR BLD AUTO: 1.1 % (ref 0.3–6.2)
ERYTHROCYTE [DISTWIDTH] IN BLOOD BY AUTOMATED COUNT: 13.2 % (ref 12.3–15.4)
GLOBULIN SER CALC-MCNC: 2.4 GM/DL
GLUCOSE SERPL-MCNC: 97 MG/DL (ref 65–99)
HBA1C MFR BLD: 5.9 % (ref 4.8–5.6)
HCT VFR BLD AUTO: 39.1 % (ref 34–46.6)
HDLC SERPL-MCNC: 52 MG/DL (ref 40–60)
HGB BLD-MCNC: 13.2 G/DL (ref 12–15.9)
IMM GRANULOCYTES # BLD AUTO: 0.02 10*3/MM3 (ref 0–0.05)
IMM GRANULOCYTES NFR BLD AUTO: 0.3 % (ref 0–0.5)
LDLC SERPL CALC-MCNC: 38 MG/DL (ref 0–100)
LYMPHOCYTES # BLD AUTO: 1.85 10*3/MM3 (ref 0.7–3.1)
LYMPHOCYTES NFR BLD AUTO: 29.2 % (ref 19.6–45.3)
MCH RBC QN AUTO: 28.4 PG (ref 26.6–33)
MCHC RBC AUTO-ENTMCNC: 33.8 G/DL (ref 31.5–35.7)
MCV RBC AUTO: 84.3 FL (ref 79–97)
MICROALBUMIN UR-MCNC: 8.9 UG/ML
MONOCYTES # BLD AUTO: 0.46 10*3/MM3 (ref 0.1–0.9)
MONOCYTES NFR BLD AUTO: 7.3 % (ref 5–12)
NEUTROPHILS # BLD AUTO: 3.91 10*3/MM3 (ref 1.7–7)
NEUTROPHILS NFR BLD AUTO: 61.6 % (ref 42.7–76)
NRBC BLD AUTO-RTO: 0 /100 WBC (ref 0–0.2)
PLATELET # BLD AUTO: 308 10*3/MM3 (ref 140–450)
POTASSIUM SERPL-SCNC: 4.5 MMOL/L (ref 3.5–5.2)
PROT SERPL-MCNC: 7.1 G/DL (ref 6–8.5)
RBC # BLD AUTO: 4.64 10*6/MM3 (ref 3.77–5.28)
SODIUM SERPL-SCNC: 141 MMOL/L (ref 136–145)
T4 FREE SERPL-MCNC: 1.49 NG/DL (ref 0.93–1.7)
TRIGL SERPL-MCNC: 83 MG/DL (ref 0–150)
TSH SERPL DL<=0.005 MIU/L-ACNC: 1.12 UIU/ML (ref 0.27–4.2)
VLDLC SERPL CALC-MCNC: 17 MG/DL (ref 5–40)
WBC # BLD AUTO: 6.34 10*3/MM3 (ref 3.4–10.8)

## 2021-04-07 NOTE — PROGRESS NOTES
"Nate:    Kimi, here are the result(s) of your test(s):     Labs remain stable overall.   Kidneys appear little \"dry\". Increase water intake.   Continue current plans.     Please do not hesitate to contact me if you have questions.   " No

## 2021-08-04 ENCOUNTER — HOSPITAL ENCOUNTER (OUTPATIENT)
Dept: BONE DENSITY | Facility: HOSPITAL | Age: 71
Discharge: HOME | End: 2021-08-04
Admitting: INTERNAL MEDICINE

## 2021-08-04 PROCEDURE — 77080 DXA BONE DENSITY AXIAL: CPT

## 2021-08-13 ENCOUNTER — OFFICE VISIT (OUTPATIENT)
Dept: INTERNAL MEDICINE | Age: 71
End: 2021-08-13

## 2021-08-13 VITALS
OXYGEN SATURATION: 99 % | DIASTOLIC BLOOD PRESSURE: 70 MMHG | SYSTOLIC BLOOD PRESSURE: 128 MMHG | TEMPERATURE: 97.8 F | HEIGHT: 61 IN | HEART RATE: 88 BPM | WEIGHT: 159 LBS | BODY MASS INDEX: 30.02 KG/M2

## 2021-08-13 DIAGNOSIS — R80.9 TYPE 2 DIABETES MELLITUS WITH MICROALBUMINURIA, WITHOUT LONG-TERM CURRENT USE OF INSULIN (HCC): Primary | ICD-10-CM

## 2021-08-13 DIAGNOSIS — E11.29 TYPE 2 DIABETES MELLITUS WITH MICROALBUMINURIA, WITHOUT LONG-TERM CURRENT USE OF INSULIN (HCC): Primary | ICD-10-CM

## 2021-08-13 DIAGNOSIS — E78.2 MIXED HYPERLIPIDEMIA: Chronic | ICD-10-CM

## 2021-08-13 DIAGNOSIS — I10 ESSENTIAL HYPERTENSION: Chronic | ICD-10-CM

## 2021-08-13 LAB — HBA1C MFR BLD: 5.8 %

## 2021-08-13 PROCEDURE — 83036 HEMOGLOBIN GLYCOSYLATED A1C: CPT | Performed by: INTERNAL MEDICINE

## 2021-08-13 PROCEDURE — 99214 OFFICE O/P EST MOD 30 MIN: CPT | Performed by: INTERNAL MEDICINE

## 2021-08-13 NOTE — PROGRESS NOTES
"    I N T E R N A L  M E D I C I N E  J U N O H  K I M  M D      ENCOUNTER DATE:  08/13/2021    Kimi A Alberto / 71 y.o. / female      CHIEF COMPLAINT / REASON FOR OFFICE VISIT     Hypertension, Diabetes, Hyperlipidemia, and Obesity      ASSESSMENT & PLAN     Problem List Items Addressed This Visit        High    Type 2 diabetes mellitus with renal complication (CMS/[facility]) - Primary (Chronic)    Overview     Complications: +microalbuminuria (5/2018)    Continue Ozempic 0.5 mg, metformin  mg 2 BID, and pioglitazone 15 mg.          Current Assessment & Plan     A1c today is 5.8.   Continue current medications.     Lab Results   Component Value Date    HGBA1C 5.8 08/13/2021    HGBA1C 5.90 (H) 04/06/2021    HGBA1C 6.2 11/11/2020             Relevant Medications    pioglitazone (ACTOS) 15 MG tablet    Ozempic, 0.25 or 0.5 MG/DOSE, 2 MG/1.5ML solution pen-injector    lisinopril (PRINIVIL,ZESTRIL) 30 MG tablet    metFORMIN ER (GLUCOPHAGE-XR) 500 MG 24 hr tablet    Other Relevant Orders    POC Glycosylated Hemoglobin (Hb A1C) (Completed)       Medium    Hyperlipidemia (Chronic)    Overview     Continue simvastatin 40 mg daily.          Relevant Medications    Omega-3 Fatty Acids (FISH OIL) 1000 MG capsule capsule    simvastatin (ZOCOR) 40 MG tablet    Hypertension (Chronic)    Overview     Continue lisinopril 30 mg and carvedilol 3.125 mg BID.          Relevant Medications    lisinopril (PRINIVIL,ZESTRIL) 30 MG tablet    carvedilol (COREG) 3.125 MG tablet        Orders Placed This Encounter   Procedures   • POC Glycosylated Hemoglobin (Hb A1C)     No orders of the defined types were placed in this encounter.      SUMMARY/DISCUSSION  •       Next Appointment with me: Visit date not found    Return in about 6 months (around 2/13/2022) for Diabetes.        VITAL SIGNS     Visit Vitals  /70 (BP Location: Left arm)   Pulse 88   Temp 97.8 °F (36.6 °C)   Ht 155.6 cm (61.26\")   Wt 72.1 kg (159 lb)   SpO2 99%   BMI 29.79 " kg/m²       BP Readings from Last 3 Encounters:   08/13/21 128/70   04/06/21 140/72   11/11/20 142/68     Wt Readings from Last 3 Encounters:   08/13/21 72.1 kg (159 lb)   04/06/21 73.9 kg (163 lb)   11/11/20 76.2 kg (168 lb)     Body mass index is 29.79 kg/m².      MEDICATIONS AT THE TIME OF OFFICE VISIT     Current Outpatient Medications on File Prior to Visit   Medication Sig   • carvedilol (COREG) 3.125 MG tablet TAKE ONE TABLET BY MOUTH TWICE A DAY WITH MEALS   • cyancobalamin (VITAMIN B-12) 100 MCG tablet Take  by mouth daily. As directed   • GLUCOSAMINE-CHONDROITIN PO Take 1 tablet by mouth.   • lisinopril (PRINIVIL,ZESTRIL) 30 MG tablet Take 1 tablet by mouth Daily.   • Melatonin 10 MG tablet Take 10 mg by mouth.   • metFORMIN ER (GLUCOPHAGE-XR) 500 MG 24 hr tablet TAKE TWO TABLETS BY MOUTH TWICE A DAY   • Multiple Vitamins-Minerals (MULTIVITAMIN ADULT PO) Take 1 tablet by mouth Daily.   • Multiple Vitamins-Minerals (OCUVITE ADULT 50+) capsule Take 1 capsule by mouth daily.   • Omega-3 Fatty Acids (FISH OIL) 1000 MG capsule capsule Take 3 capsules by mouth daily.   • Ozempic, 0.25 or 0.5 MG/DOSE, 2 MG/1.5ML solution pen-injector INJECT 0.5 MG UNDER THE SKIN INTO THE APPROPRIATE AREA AS DIRECTED ONE TIME PER WEEK   • pioglitazone (ACTOS) 15 MG tablet TAKE ONE TABLET BY MOUTH EVERY MORNING   • simvastatin (ZOCOR) 40 MG tablet TAKE ONE TABLET BY MOUTH ONCE NIGHTLY   • MYRBETRIQ 50 MG tablet sustained-release 24 hour 24 hr tablet Daily.   • PREMARIN 0.625 MG/GM vaginal cream Insert 0.5 g into the vagina Daily.         HISTORY OF PRESENT ILLNESS     Doing well without problems.   Did not have the COVID-19 vaccine but reports + antibody.    Lab Results   Component Value Date    HGBA1C 5.8 08/13/2021    HGBA1C 5.90 (H) 04/06/2021    HGBA1C 6.2 11/11/2020    CREATININE 0.62 04/06/2021    LDL 38 04/06/2021    MICROALBUR 8.9 04/06/2021          REVIEW OF SYSTEMS     Intended wt loss   Constitutional neg except per HPI    Resp neg  CV neg   GI neg       PHYSICAL EXAMINATION     Physical Exam  General: No acute distress  Psych: Normal thought and judgment        REVIEWED DATA     Labs:       Lab Results   Component Value Date     04/06/2021    K 4.5 04/06/2021    CALCIUM 10.1 04/06/2021    AST 13 04/06/2021    ALT 14 04/06/2021    BUN 16 04/06/2021    CREATININE 0.62 04/06/2021    CREATININE 0.66 07/08/2020    CREATININE 0.59 02/05/2020    EGFRIFNONA 95 04/06/2021    EGFRIFAFRI 116 04/06/2021       Lab Results   Component Value Date    HGBA1C 5.8 08/13/2021    HGBA1C 5.90 (H) 04/06/2021    HGBA1C 6.2 11/11/2020       Lab Results   Component Value Date    LDL 38 04/06/2021    LDL 52 07/08/2020    HDL 52 04/06/2021    TRIG 83 04/06/2021       Lab Results   Component Value Date    TSH 1.120 04/06/2021    TSH 1.450 05/07/2018    TSH 1.540 05/30/2017    FREET4 1.49 04/06/2021    FREET4 1.38 05/07/2018    FREET4 1.46 05/30/2017       Lab Results   Component Value Date    WBC 6.34 04/06/2021    HGB 13.2 04/06/2021     04/06/2021         Imaging:           Medical Tests:           Summary of old records / correspondence / consultant report:           Request outside records:           *Examiner was wearing KN95 mask and eye protection during the entire duration of the visit. Patient was masked the entire time.   Minimum social distance of 6 ft maintained entire visit except if physical contact was necessary as documented.     **Dragon Disclaimer:   Much of this encounter note is an electronic transcription/translation of spoken language to printed text. The electronic translation of spoken language may permit erroneous, or at times, nonsensical words or phrases to be inadvertently transcribed. Although I have reviewed the note for such errors, some may still exist.       Template created by Jarrod Leal MD

## 2021-08-13 NOTE — ASSESSMENT & PLAN NOTE
A1c today is 5.8.   Continue current medications.     Lab Results   Component Value Date    HGBA1C 5.8 08/13/2021    HGBA1C 5.90 (H) 04/06/2021    HGBA1C 6.2 11/11/2020

## 2021-09-03 ENCOUNTER — TELEPHONE (OUTPATIENT)
Dept: INTERNAL MEDICINE | Age: 71
End: 2021-09-03

## 2021-09-09 DIAGNOSIS — E11.29 TYPE 2 DIABETES MELLITUS WITH MICROALBUMINURIA, WITHOUT LONG-TERM CURRENT USE OF INSULIN (HCC): Chronic | ICD-10-CM

## 2021-09-09 DIAGNOSIS — R80.9 TYPE 2 DIABETES MELLITUS WITH MICROALBUMINURIA, WITHOUT LONG-TERM CURRENT USE OF INSULIN (HCC): Chronic | ICD-10-CM

## 2021-09-10 RX ORDER — PIOGLITAZONEHYDROCHLORIDE 15 MG/1
TABLET ORAL
Qty: 90 TABLET | Refills: 3 | Status: SHIPPED | OUTPATIENT
Start: 2021-09-10 | End: 2022-08-25

## 2021-09-16 ENCOUNTER — PREP FOR SURGERY (OUTPATIENT)
Dept: OTHER | Facility: HOSPITAL | Age: 71
End: 2021-09-16

## 2021-09-16 DIAGNOSIS — N39.3 FEMALE STRESS INCONTINENCE: Primary | ICD-10-CM

## 2021-09-16 RX ORDER — PHENAZOPYRIDINE HYDROCHLORIDE 200 MG/1
200 TABLET, FILM COATED ORAL ONCE
Status: CANCELLED | OUTPATIENT
Start: 2021-09-21 | End: 2021-09-16

## 2021-09-16 RX ORDER — SODIUM CHLORIDE 0.9 % (FLUSH) 0.9 %
10 SYRINGE (ML) INJECTION AS NEEDED
Status: CANCELLED | OUTPATIENT
Start: 2021-09-21

## 2021-09-16 RX ORDER — CEFAZOLIN SODIUM 2 G/100ML
2 INJECTION, SOLUTION INTRAVENOUS ONCE
Status: CANCELLED | OUTPATIENT
Start: 2021-09-21 | End: 2021-09-16

## 2021-09-16 RX ORDER — SODIUM CHLORIDE 0.9 % (FLUSH) 0.9 %
3 SYRINGE (ML) INJECTION EVERY 12 HOURS SCHEDULED
Status: CANCELLED | OUTPATIENT
Start: 2021-09-21

## 2021-09-17 ENCOUNTER — TRANSCRIBE ORDERS (OUTPATIENT)
Dept: PREADMISSION TESTING | Facility: HOSPITAL | Age: 71
End: 2021-09-17

## 2021-09-17 ENCOUNTER — PRE-ADMISSION TESTING (OUTPATIENT)
Dept: PREADMISSION TESTING | Facility: HOSPITAL | Age: 71
End: 2021-09-17

## 2021-09-17 VITALS
DIASTOLIC BLOOD PRESSURE: 73 MMHG | RESPIRATION RATE: 16 BRPM | OXYGEN SATURATION: 99 % | WEIGHT: 157 LBS | SYSTOLIC BLOOD PRESSURE: 142 MMHG | HEIGHT: 61 IN | BODY MASS INDEX: 29.64 KG/M2 | TEMPERATURE: 98 F | HEART RATE: 102 BPM

## 2021-09-17 DIAGNOSIS — Z01.818 OTHER SPECIFIED PRE-OPERATIVE EXAMINATION: Primary | ICD-10-CM

## 2021-09-17 PROBLEM — N39.3 FEMALE STRESS INCONTINENCE: Status: ACTIVE | Noted: 2021-09-17

## 2021-09-17 LAB
ANION GAP SERPL CALCULATED.3IONS-SCNC: 14.8 MMOL/L (ref 5–15)
BUN SERPL-MCNC: 14 MG/DL (ref 8–23)
BUN/CREAT SERPL: 26.4 (ref 7–25)
CALCIUM SPEC-SCNC: 9.9 MG/DL (ref 8.6–10.5)
CHLORIDE SERPL-SCNC: 102 MMOL/L (ref 98–107)
CO2 SERPL-SCNC: 23.2 MMOL/L (ref 22–29)
CREAT SERPL-MCNC: 0.53 MG/DL (ref 0.57–1)
DEPRECATED RDW RBC AUTO: 40.6 FL (ref 37–54)
ERYTHROCYTE [DISTWIDTH] IN BLOOD BY AUTOMATED COUNT: 13.1 % (ref 12.3–15.4)
GFR SERPL CREATININE-BSD FRML MDRD: 114 ML/MIN/1.73
GLUCOSE SERPL-MCNC: 125 MG/DL (ref 65–99)
HCT VFR BLD AUTO: 39.8 % (ref 34–46.6)
HGB BLD-MCNC: 12.9 G/DL (ref 12–15.9)
MCH RBC QN AUTO: 27.7 PG (ref 26.6–33)
MCHC RBC AUTO-ENTMCNC: 32.4 G/DL (ref 31.5–35.7)
MCV RBC AUTO: 85.6 FL (ref 79–97)
PLATELET # BLD AUTO: 307 10*3/MM3 (ref 140–450)
PMV BLD AUTO: 10.2 FL (ref 6–12)
POTASSIUM SERPL-SCNC: 4.1 MMOL/L (ref 3.5–5.2)
QT INTERVAL: 417 MS
RBC # BLD AUTO: 4.65 10*6/MM3 (ref 3.77–5.28)
SODIUM SERPL-SCNC: 140 MMOL/L (ref 136–145)
WBC # BLD AUTO: 6.67 10*3/MM3 (ref 3.4–10.8)

## 2021-09-17 PROCEDURE — 93010 ELECTROCARDIOGRAM REPORT: CPT | Performed by: INTERNAL MEDICINE

## 2021-09-17 PROCEDURE — 93005 ELECTROCARDIOGRAM TRACING: CPT

## 2021-09-17 PROCEDURE — 36415 COLL VENOUS BLD VENIPUNCTURE: CPT

## 2021-09-17 PROCEDURE — 80048 BASIC METABOLIC PNL TOTAL CA: CPT

## 2021-09-17 PROCEDURE — 85027 COMPLETE CBC AUTOMATED: CPT

## 2021-09-17 RX ORDER — CHLORHEXIDINE GLUCONATE 500 MG/1
CLOTH TOPICAL TAKE AS DIRECTED
COMMUNITY
End: 2021-09-21 | Stop reason: HOSPADM

## 2021-09-17 NOTE — DISCHARGE INSTRUCTIONS

## 2021-09-18 ENCOUNTER — LAB (OUTPATIENT)
Dept: LAB | Facility: HOSPITAL | Age: 71
End: 2021-09-18

## 2021-09-18 DIAGNOSIS — Z01.818 OTHER SPECIFIED PRE-OPERATIVE EXAMINATION: ICD-10-CM

## 2021-09-18 LAB — SARS-COV-2 ORF1AB RESP QL NAA+PROBE: NOT DETECTED

## 2021-09-18 PROCEDURE — C9803 HOPD COVID-19 SPEC COLLECT: HCPCS

## 2021-09-18 PROCEDURE — U0004 COV-19 TEST NON-CDC HGH THRU: HCPCS

## 2021-09-18 PROCEDURE — U0005 INFEC AGEN DETEC AMPLI PROBE: HCPCS

## 2021-09-21 ENCOUNTER — ANESTHESIA EVENT (OUTPATIENT)
Dept: PERIOP | Facility: HOSPITAL | Age: 71
End: 2021-09-21

## 2021-09-21 ENCOUNTER — HOSPITAL ENCOUNTER (OUTPATIENT)
Facility: HOSPITAL | Age: 71
Setting detail: HOSPITAL OUTPATIENT SURGERY
Discharge: HOME | End: 2021-09-21
Attending: OBSTETRICS & GYNECOLOGY | Admitting: OBSTETRICS & GYNECOLOGY

## 2021-09-21 ENCOUNTER — ANESTHESIA (OUTPATIENT)
Dept: PERIOP | Facility: HOSPITAL | Age: 71
End: 2021-09-21

## 2021-09-21 VITALS
RESPIRATION RATE: 16 BRPM | DIASTOLIC BLOOD PRESSURE: 81 MMHG | WEIGHT: 158.95 LBS | HEIGHT: 61 IN | HEART RATE: 91 BPM | BODY MASS INDEX: 30.01 KG/M2 | OXYGEN SATURATION: 94 % | SYSTOLIC BLOOD PRESSURE: 158 MMHG | TEMPERATURE: 97.8 F

## 2021-09-21 DIAGNOSIS — N39.3 FEMALE STRESS INCONTINENCE: ICD-10-CM

## 2021-09-21 LAB
GLUCOSE BLDC GLUCOMTR-MCNC: 104 MG/DL (ref 70–130)
GLUCOSE BLDC GLUCOMTR-MCNC: 88 MG/DL (ref 70–130)
GLUCOSE BLDC GLUCOMTR-MCNC: 92 MG/DL (ref 70–130)

## 2021-09-21 PROCEDURE — C1889 IMPLANT/INSERT DEVICE, NOC: HCPCS

## 2021-09-21 PROCEDURE — 25010000002 FENTANYL CITRATE (PF) 50 MCG/ML SOLUTION: Performed by: NURSE ANESTHETIST, CERTIFIED REGISTERED

## 2021-09-21 PROCEDURE — 25010000003 CEFAZOLIN IN DEXTROSE 2-4 GM/100ML-% SOLUTION: Performed by: OBSTETRICS & GYNECOLOGY

## 2021-09-21 PROCEDURE — 25010000002 DEXAMETHASONE PER 1 MG: Performed by: NURSE ANESTHETIST, CERTIFIED REGISTERED

## 2021-09-21 PROCEDURE — 82962 GLUCOSE BLOOD TEST: CPT

## 2021-09-21 PROCEDURE — 25010000002 ONDANSETRON PER 1 MG: Performed by: NURSE ANESTHETIST, CERTIFIED REGISTERED

## 2021-09-21 PROCEDURE — 25010000002 PROPOFOL 10 MG/ML EMULSION: Performed by: NURSE ANESTHETIST, CERTIFIED REGISTERED

## 2021-09-21 DEVICE — GRFT TISS STRATTICE FIRM 6X10CM: Type: IMPLANTABLE DEVICE | Site: VAGINA | Status: FUNCTIONAL

## 2021-09-21 RX ORDER — PROMETHAZINE HYDROCHLORIDE 25 MG/1
25 SUPPOSITORY RECTAL ONCE AS NEEDED
Status: DISCONTINUED | OUTPATIENT
Start: 2021-09-21 | End: 2021-09-21 | Stop reason: HOSPADM

## 2021-09-21 RX ORDER — PROMETHAZINE HYDROCHLORIDE 25 MG/1
25 TABLET ORAL ONCE AS NEEDED
Status: DISCONTINUED | OUTPATIENT
Start: 2021-09-21 | End: 2021-09-21 | Stop reason: HOSPADM

## 2021-09-21 RX ORDER — EPHEDRINE SULFATE 50 MG/ML
5 INJECTION, SOLUTION INTRAVENOUS ONCE AS NEEDED
Status: DISCONTINUED | OUTPATIENT
Start: 2021-09-21 | End: 2021-09-21 | Stop reason: HOSPADM

## 2021-09-21 RX ORDER — SODIUM CHLORIDE 0.9 % (FLUSH) 0.9 %
3-10 SYRINGE (ML) INJECTION AS NEEDED
Status: DISCONTINUED | OUTPATIENT
Start: 2021-09-21 | End: 2021-09-21 | Stop reason: HOSPADM

## 2021-09-21 RX ORDER — DIPHENHYDRAMINE HCL 25 MG
25 CAPSULE ORAL
Status: DISCONTINUED | OUTPATIENT
Start: 2021-09-21 | End: 2021-09-21 | Stop reason: HOSPADM

## 2021-09-21 RX ORDER — OXYCODONE AND ACETAMINOPHEN 10; 325 MG/1; MG/1
1 TABLET ORAL EVERY 4 HOURS PRN
Status: DISCONTINUED | OUTPATIENT
Start: 2021-09-21 | End: 2021-09-21 | Stop reason: HOSPADM

## 2021-09-21 RX ORDER — PROPOFOL 10 MG/ML
VIAL (ML) INTRAVENOUS AS NEEDED
Status: DISCONTINUED | OUTPATIENT
Start: 2021-09-21 | End: 2021-09-21 | Stop reason: SURG

## 2021-09-21 RX ORDER — LIDOCAINE HYDROCHLORIDE 20 MG/ML
INJECTION, SOLUTION INFILTRATION; PERINEURAL AS NEEDED
Status: DISCONTINUED | OUTPATIENT
Start: 2021-09-21 | End: 2021-09-21 | Stop reason: SURG

## 2021-09-21 RX ORDER — LABETALOL HYDROCHLORIDE 5 MG/ML
INJECTION, SOLUTION INTRAVENOUS AS NEEDED
Status: DISCONTINUED | OUTPATIENT
Start: 2021-09-21 | End: 2021-09-21 | Stop reason: SURG

## 2021-09-21 RX ORDER — SODIUM CHLORIDE, SODIUM LACTATE, POTASSIUM CHLORIDE, CALCIUM CHLORIDE 600; 310; 30; 20 MG/100ML; MG/100ML; MG/100ML; MG/100ML
9 INJECTION, SOLUTION INTRAVENOUS CONTINUOUS
Status: DISCONTINUED | OUTPATIENT
Start: 2021-09-21 | End: 2021-09-21 | Stop reason: HOSPADM

## 2021-09-21 RX ORDER — SODIUM CHLORIDE 0.9 % (FLUSH) 0.9 %
3 SYRINGE (ML) INJECTION EVERY 12 HOURS SCHEDULED
Status: DISCONTINUED | OUTPATIENT
Start: 2021-09-21 | End: 2021-09-21 | Stop reason: HOSPADM

## 2021-09-21 RX ORDER — DEXAMETHASONE SODIUM PHOSPHATE 10 MG/ML
INJECTION INTRAMUSCULAR; INTRAVENOUS AS NEEDED
Status: DISCONTINUED | OUTPATIENT
Start: 2021-09-21 | End: 2021-09-21 | Stop reason: SURG

## 2021-09-21 RX ORDER — NALOXONE HCL 0.4 MG/ML
0.2 VIAL (ML) INJECTION AS NEEDED
Status: DISCONTINUED | OUTPATIENT
Start: 2021-09-21 | End: 2021-09-21 | Stop reason: HOSPADM

## 2021-09-21 RX ORDER — HYDROMORPHONE HYDROCHLORIDE 1 MG/ML
0.5 INJECTION, SOLUTION INTRAMUSCULAR; INTRAVENOUS; SUBCUTANEOUS
Status: DISCONTINUED | OUTPATIENT
Start: 2021-09-21 | End: 2021-09-21 | Stop reason: HOSPADM

## 2021-09-21 RX ORDER — FLUMAZENIL 0.1 MG/ML
0.2 INJECTION INTRAVENOUS AS NEEDED
Status: DISCONTINUED | OUTPATIENT
Start: 2021-09-21 | End: 2021-09-21 | Stop reason: HOSPADM

## 2021-09-21 RX ORDER — ONDANSETRON 2 MG/ML
INJECTION INTRAMUSCULAR; INTRAVENOUS AS NEEDED
Status: DISCONTINUED | OUTPATIENT
Start: 2021-09-21 | End: 2021-09-21 | Stop reason: SURG

## 2021-09-21 RX ORDER — MIDAZOLAM HYDROCHLORIDE 1 MG/ML
0.5 INJECTION INTRAMUSCULAR; INTRAVENOUS
Status: DISCONTINUED | OUTPATIENT
Start: 2021-09-21 | End: 2021-09-21 | Stop reason: HOSPADM

## 2021-09-21 RX ORDER — FENTANYL CITRATE 50 UG/ML
INJECTION, SOLUTION INTRAMUSCULAR; INTRAVENOUS AS NEEDED
Status: DISCONTINUED | OUTPATIENT
Start: 2021-09-21 | End: 2021-09-21 | Stop reason: SURG

## 2021-09-21 RX ORDER — MAGNESIUM HYDROXIDE 1200 MG/15ML
LIQUID ORAL AS NEEDED
Status: DISCONTINUED | OUTPATIENT
Start: 2021-09-21 | End: 2021-09-21 | Stop reason: HOSPADM

## 2021-09-21 RX ORDER — LIDOCAINE HYDROCHLORIDE 10 MG/ML
0.5 INJECTION, SOLUTION EPIDURAL; INFILTRATION; INTRACAUDAL; PERINEURAL ONCE AS NEEDED
Status: DISCONTINUED | OUTPATIENT
Start: 2021-09-21 | End: 2021-09-21 | Stop reason: HOSPADM

## 2021-09-21 RX ORDER — HYDRALAZINE HYDROCHLORIDE 20 MG/ML
5 INJECTION INTRAMUSCULAR; INTRAVENOUS
Status: DISCONTINUED | OUTPATIENT
Start: 2021-09-21 | End: 2021-09-21 | Stop reason: HOSPADM

## 2021-09-21 RX ORDER — FENTANYL CITRATE 50 UG/ML
50 INJECTION, SOLUTION INTRAMUSCULAR; INTRAVENOUS
Status: DISCONTINUED | OUTPATIENT
Start: 2021-09-21 | End: 2021-09-21 | Stop reason: HOSPADM

## 2021-09-21 RX ORDER — FAMOTIDINE 10 MG/ML
20 INJECTION, SOLUTION INTRAVENOUS ONCE
Status: COMPLETED | OUTPATIENT
Start: 2021-09-21 | End: 2021-09-21

## 2021-09-21 RX ORDER — CEFAZOLIN SODIUM 2 G/100ML
2 INJECTION, SOLUTION INTRAVENOUS ONCE
Status: COMPLETED | OUTPATIENT
Start: 2021-09-21 | End: 2021-09-21

## 2021-09-21 RX ORDER — PHENAZOPYRIDINE HYDROCHLORIDE 200 MG/1
200 TABLET, FILM COATED ORAL ONCE
Status: COMPLETED | OUTPATIENT
Start: 2021-09-21 | End: 2021-09-21

## 2021-09-21 RX ORDER — DIPHENHYDRAMINE HYDROCHLORIDE 50 MG/ML
12.5 INJECTION INTRAMUSCULAR; INTRAVENOUS
Status: DISCONTINUED | OUTPATIENT
Start: 2021-09-21 | End: 2021-09-21 | Stop reason: HOSPADM

## 2021-09-21 RX ORDER — SODIUM CHLORIDE 0.9 % (FLUSH) 0.9 %
10 SYRINGE (ML) INJECTION AS NEEDED
Status: DISCONTINUED | OUTPATIENT
Start: 2021-09-21 | End: 2021-09-21 | Stop reason: HOSPADM

## 2021-09-21 RX ORDER — IBUPROFEN 600 MG/1
600 TABLET ORAL ONCE AS NEEDED
Status: DISCONTINUED | OUTPATIENT
Start: 2021-09-21 | End: 2021-09-21 | Stop reason: HOSPADM

## 2021-09-21 RX ORDER — BUPIVACAINE HYDROCHLORIDE AND EPINEPHRINE 2.5; 5 MG/ML; UG/ML
INJECTION, SOLUTION EPIDURAL; INFILTRATION; INTRACAUDAL; PERINEURAL AS NEEDED
Status: DISCONTINUED | OUTPATIENT
Start: 2021-09-21 | End: 2021-09-21 | Stop reason: HOSPADM

## 2021-09-21 RX ORDER — LABETALOL HYDROCHLORIDE 5 MG/ML
5 INJECTION, SOLUTION INTRAVENOUS
Status: DISCONTINUED | OUTPATIENT
Start: 2021-09-21 | End: 2021-09-21 | Stop reason: HOSPADM

## 2021-09-21 RX ORDER — HYDROCODONE BITARTRATE AND ACETAMINOPHEN 7.5; 325 MG/1; MG/1
1 TABLET ORAL ONCE AS NEEDED
Status: DISCONTINUED | OUTPATIENT
Start: 2021-09-21 | End: 2021-09-21 | Stop reason: HOSPADM

## 2021-09-21 RX ORDER — ONDANSETRON 2 MG/ML
4 INJECTION INTRAMUSCULAR; INTRAVENOUS ONCE AS NEEDED
Status: DISCONTINUED | OUTPATIENT
Start: 2021-09-21 | End: 2021-09-21 | Stop reason: HOSPADM

## 2021-09-21 RX ADMIN — FENTANYL CITRATE 25 MCG: 50 INJECTION INTRAMUSCULAR; INTRAVENOUS at 15:00

## 2021-09-21 RX ADMIN — SODIUM CHLORIDE, POTASSIUM CHLORIDE, SODIUM LACTATE AND CALCIUM CHLORIDE: 600; 310; 30; 20 INJECTION, SOLUTION INTRAVENOUS at 16:24

## 2021-09-21 RX ADMIN — FENTANYL CITRATE 25 MCG: 50 INJECTION INTRAMUSCULAR; INTRAVENOUS at 14:35

## 2021-09-21 RX ADMIN — FENTANYL CITRATE 25 MCG: 50 INJECTION INTRAMUSCULAR; INTRAVENOUS at 15:58

## 2021-09-21 RX ADMIN — FENTANYL CITRATE 25 MCG: 50 INJECTION INTRAMUSCULAR; INTRAVENOUS at 14:43

## 2021-09-21 RX ADMIN — LABETALOL HYDROCHLORIDE 10 MG: 5 INJECTION, SOLUTION INTRAVENOUS at 16:16

## 2021-09-21 RX ADMIN — FAMOTIDINE 20 MG: 10 INJECTION INTRAVENOUS at 12:58

## 2021-09-21 RX ADMIN — DEXAMETHASONE SODIUM PHOSPHATE 4 MG: 10 INJECTION INTRAMUSCULAR; INTRAVENOUS at 14:31

## 2021-09-21 RX ADMIN — CEFAZOLIN SODIUM 2 G: 2 INJECTION, SOLUTION INTRAVENOUS at 14:08

## 2021-09-21 RX ADMIN — FENTANYL CITRATE 50 MCG: 50 INJECTION INTRAMUSCULAR; INTRAVENOUS at 15:30

## 2021-09-21 RX ADMIN — FENTANYL CITRATE 50 MCG: 50 INJECTION INTRAMUSCULAR; INTRAVENOUS at 16:26

## 2021-09-21 RX ADMIN — LIDOCAINE HYDROCHLORIDE 60 MG: 20 INJECTION, SOLUTION INFILTRATION; PERINEURAL at 14:20

## 2021-09-21 RX ADMIN — FENTANYL CITRATE 50 MCG: 50 INJECTION INTRAMUSCULAR; INTRAVENOUS at 16:31

## 2021-09-21 RX ADMIN — FENTANYL CITRATE 25 MCG: 50 INJECTION INTRAMUSCULAR; INTRAVENOUS at 15:52

## 2021-09-21 RX ADMIN — PHENAZOPYRIDINE 200 MG: 200 TABLET ORAL at 12:53

## 2021-09-21 RX ADMIN — SODIUM CHLORIDE, POTASSIUM CHLORIDE, SODIUM LACTATE AND CALCIUM CHLORIDE 9 ML/HR: 600; 310; 30; 20 INJECTION, SOLUTION INTRAVENOUS at 12:58

## 2021-09-21 RX ADMIN — FENTANYL CITRATE 25 MCG: 50 INJECTION INTRAMUSCULAR; INTRAVENOUS at 14:28

## 2021-09-21 RX ADMIN — ONDANSETRON 4 MG: 2 INJECTION INTRAMUSCULAR; INTRAVENOUS at 15:59

## 2021-09-21 RX ADMIN — PROPOFOL 150 MG: 10 INJECTION, EMULSION INTRAVENOUS at 14:20

## 2021-09-21 NOTE — ANESTHESIA POSTPROCEDURE EVALUATION
Patient: Kimi Dominguez    Procedure Summary     Date: 09/21/21 Room / Location: [facility] OR [address] [place] [address] MAIN OR    Anesthesia Start: 1414 Anesthesia Stop: 1641    Procedure: pubovaginal sling cystourethroscopy (N/A Vagina) Diagnosis:       Female stress incontinence      (Female stress incontinence [N39.3])    Surgeons: Janneth Matamoros MD Provider: Anshul Small MD    Anesthesia Type: general ASA Status: 3          Anesthesia Type: general    Vitals  Vitals Value Taken Time   /75 09/21/21 1736   Temp 36.6 °C (97.8 °F) 09/21/21 1735   Pulse 79 09/21/21 1739   Resp 16 09/21/21 1735   SpO2 98 % 09/21/21 1739   Vitals shown include unvalidated device data.        Anesthesia Post Evaluation

## 2021-09-21 NOTE — ANESTHESIA PREPROCEDURE EVALUATION
Anesthesia Evaluation     Patient summary reviewed and Nursing notes reviewed   NPO Solid Status: > 8 hours  NPO Liquid Status: > 2 hours           Airway   Mallampati: I  TM distance: >3 FB  Neck ROM: full  Dental - normal exam     Pulmonary - negative pulmonary ROS and normal exam   Cardiovascular - normal exam    ECG reviewed  Patient on routine beta blocker    (+) hypertension 2 medications or greater, dysrhythmias (LBBB), hyperlipidemia,   (-) angina, orthopnea, PND, HARRIS    ROS comment: Sinus rhythm  Probable left atrial enlargement  Left bundle branch block  NO PRIOR TRACING AVAILABLE FOR COMPARISON    Neuro/Psych- negative ROS  GI/Hepatic/Renal/Endo    (+) obesity,   diabetes mellitus type 2 well controlled,     Musculoskeletal     Abdominal  - normal exam    Bowel sounds: normal.   Substance History - negative use     OB/GYN negative ob/gyn ROS         Other   arthritis,    history of cancer (Skin cancer)                      Anesthesia Plan    ASA 3     general     intravenous induction     Anesthetic plan, all risks, benefits, and alternatives have been provided, discussed and informed consent has been obtained with: patient.

## 2021-09-21 NOTE — OP NOTE
OPERATIVE REPORT    [facility] MAIN OR    Patient:  Kimi Dominguez       :     1950     Date of Operation:  2021    Pre-operative Diagnosis :  1. Urinary, incontinence, stress female N39.3    Post-operative Diagnosis: same     Surgeon:  Janneth Matamoros MD       Name of Operation/Procedure:   1. Pubovaginal sling, retropubic, porcine, [id number]  2. Cystourethroscopy     Findings: On cystourethroscopy there was bilateral efflux of Pyridium-stained urine from both the right and the left ureteral orifices. There were no lesions or foreign material of the bladder or the urethra.     Description of procedure: The patient was taken to the Operating Room with a peripheral IV in place. She underwent the induction of general endotracheal anesthesia, was prepped and draped in the dorsal lithotomy position in [facility]. Cystourethroscopy showed no lesions or foreign material of the bladder or the urethra. There is bilateral efflux of urine from both the right and the left ureteral orifices. The cystoscope was removed and a Vanegas was placed and set to straight drainage. A sub-urethral incision was made and dissected bilaterally. A sheet of porcine material was used to create a natural biologic porcine sling with lesser risks than synthetic as the biologic sling represents the standard or care prior and the use of a biologic sling is superior to synthetic mesh slings. This 1 centimeter porcine biologic sling was placed via the transvaginal route in the retropubic space. Cystourethroscopy with each transvaginal introducer in place showed no lesions or foreign material of the bladder or the urethra. The pubovaginal sling was adjusted without tension so that a curved Stearns easily fit between the sub-urethral tissue and the sling. The excess suprapubic sling ends were trimmed and the skin lifted away. These incisions were closed with 4-0 monocryl absorbable suture. With a Vanegas catheter in place, the  sub-urethral incision was closed with 2-0 Vicryl and was hemostatic.  The catheter was removed and the patient was taken out of the dorsal lithotomy position, awakened from general anesthesia, and taken to the Recovery Room in good condition. There were no complications to the procedures. All final needle, sponge, and instrument counts were reported as correct.             Fluid:  1000 mL    Urine output:   250mL     Estimated Blood Loss:  150 mL               Janneth Matamoros MD, MSc, FACOG, FACS

## 2021-09-21 NOTE — ANESTHESIA PROCEDURE NOTES
Airway  Urgency: elective    Date/Time: 9/21/2021 2:22 PM  Airway not difficult    General Information and Staff    Patient location during procedure: OR  CRNA: Dara Johnson CRNA    Indications and Patient Condition  Indications for airway management: airway protection    Preoxygenated: yes  Mask difficulty assessment: 1 - vent by mask    Final Airway Details  Final airway type: supraglottic airway      Successful airway: classic  Size 4    Number of attempts at approach: 1  Assessment: lips, teeth, and gum same as pre-op and atraumatic intubation    Additional Comments  Smooth IV induction. LMA inserted with ease. Cuff up. LMA secured BEBS

## 2021-09-21 NOTE — H&P
Female Pelvic Medicine and Reconstructive Surgery   History & Physical    Patient Identification:  Name: Kimi Dominguez Age: 71 y.o. Sex: female :  1950 MRN: Female Pelvic Medicine and Reconstructive Surgery   History & Physical    Patient Identification:  Name: Kimi Dominguez Age: 71 y.o. Sex: female :  1950 MRN: [id number]                            Problem List:    Female stress incontinence    Past Medical History:  Past Medical History:   Diagnosis Date   • B12 deficiency    • Diabetes mellitus (CMS/HCC)    • Female stress incontinence    • Hyperlipidemia    • Hypertension    • Loose stools    • Obesity    • Rectocele 2018   • Shingles     left eye   • Skin cancer    • Urinary incontinence      Past Surgical History:  Past Surgical History:   Procedure Laterality Date   • COLONOSCOPY     • COLONOSCOPY N/A 10/4/2018    Procedure: COLONOSCOPY to Cecum WITH BIOPSY;  Surgeon: Geovanna Rebollar MD;  Location: [facility] ENDOSCOPY;  Service: General   • KNEE ARTHROSCOPY Left    • PAP SMEAR     • POSTERIOR VAGINAL REPAIR  2018    for rectocele   • SKIN CANCER DESTRUCTION      basal cells removed on nose bridge, left thigh squamous removed   • TUBAL ABDOMINAL LIGATION        Home Meds:  Medications Prior to Admission   Medication Sig Dispense Refill Last Dose   • carvedilol (COREG) 3.125 MG tablet TAKE ONE TABLET BY MOUTH TWICE A DAY WITH MEALS 180 tablet 3 2021 at 0500   • Chlorhexidine Gluconate Cloth 2 % pads Apply  topically Take As Directed. PRIOR TO SURGERY   2021 at 0600   • cyancobalamin (VITAMIN B-12) 100 MCG tablet Take  by mouth daily. As directed   2021 at Unknown time   • GLUCOSAMINE-CHONDROITIN PO Take 1 tablet by mouth. HOLD PRIOR TO SURGERY   2021 at Unknown time   • lisinopril (PRINIVIL,ZESTRIL) 30 MG tablet Take 1 tablet by mouth Daily. 90 tablet 3 2021 at Unknown time   • Melatonin 10 MG tablet Take 10 mg by mouth Every Evening.   2021 at Unknown  time   • metFORMIN ER (GLUCOPHAGE-XR) 500 MG 24 hr tablet TAKE TWO TABLETS BY MOUTH TWICE A  tablet 3 9/20/2021 at 1800   • Multiple Vitamins-Minerals (MULTIVITAMIN ADULT PO) Take 1 tablet by mouth Daily. HOLD PRIOR TO SURGERY   9/20/2021 at Unknown time   • Multiple Vitamins-Minerals (OCUVITE ADULT 50+) capsule Take 1 capsule by mouth Daily. HOLD PRIOR TO SURGERY   9/20/2021 at Unknown time   • Omega-3 Fatty Acids (FISH OIL) 1000 MG capsule capsule Take 3 capsules by mouth Daily. HOLD PRIOR TO SURGERY   9/20/2021 at Unknown time   • pioglitazone (ACTOS) 15 MG tablet TAKE ONE TABLET BY MOUTH EVERY MORNING 90 tablet 3 9/20/2021 at Unknown time   • simvastatin (ZOCOR) 40 MG tablet TAKE ONE TABLET BY MOUTH ONCE NIGHTLY 90 tablet 3 9/20/2021 at 1800   • Ozempic, 0.25 or 0.5 MG/DOSE, 2 MG/1.5ML solution pen-injector INJECT 0.5 MG UNDER THE SKIN INTO THE APPROPRIATE AREA AS DIRECTED ONE TIME PER WEEK (Patient taking differently: Inject  under the skin into the appropriate area as directed 1 (One) Time Per Week. WEDNESDAYS) 1 pen 11 9/15/2021     Current Meds:     Current Facility-Administered Medications:   •  ceFAZolin in dextrose (ANCEF) IVPB solution 2 g, 2 g, Intravenous, Once, Janneth Matamoros MD, 2 g at 09/21/21 1408  •  fentaNYL citrate (PF) (SUBLIMAZE) injection 50 mcg, 50 mcg, Intravenous, Q10 Min PRN, Leighton Muniz MD  •  lactated ringers infusion, 9 mL/hr, Intravenous, Continuous, Leighton Muniz MD, Last Rate: 9 mL/hr at 09/21/21 1258, 9 mL/hr at 09/21/21 1258  •  lidocaine PF 1% (XYLOCAINE) injection 0.5 mL, 0.5 mL, Injection, Once PRN, Leighton Muniz MD  •  midazolam (VERSED) injection 0.5 mg, 0.5 mg, Intravenous, Q10 Min PRN, Leighton Muniz MD  •  sodium chloride 0.9 % flush 10 mL, 10 mL, Intravenous, PRN, Janneth Matamoros MD  •  sodium chloride 0.9 % flush 3 mL, 3 mL, Intravenous, Q12H, Janneth Matamoros MD  •  sodium chloride 0.9 % flush 3 mL, 3 mL, Intravenous, Q12H, Leighton Muniz  MD  •  sodium chloride 0.9 % flush 3-10 mL, 3-10 mL, Intravenous, PRN, Leighton Muniz MD  Allergies:  No Known Allergies  Immunizations:  Immunization History   Administered Date(s) Administered   • DTaP 07/15/2008   • FluMist 2-49yrs 10/03/2013, 10/07/2015, 2016   • Fluad Quad 65+ 2020   • Fluzone High Dose =>65 Years ([employer] ONLY) 2017, 2018, 10/25/2019   • Hepatitis A 2018, 2018   • Influenza TIV (IM) 10/01/2011   • Pneumococcal Conjugate 13-Valent (PCV13) 2014   • Pneumococcal Polysaccharide (PPSV23) 2005, 2018   • Tdap 2014     Social History:   Social History     Tobacco Use   • Smoking status: Never Smoker   • Smokeless tobacco: Never Used   Substance Use Topics   • Alcohol use: Yes     Comment: RARELY      Family History:  Family History   Problem Relation Age of Onset   • Coronary artery disease Father          82   • Prostate cancer Father    • Breast cancer Maternal Aunt    • Diabetes Paternal Grandmother         Type 2   • Stroke Mother    • No Known Problems Sister    • Thyroid cancer Neg Hx    • Colon cancer Neg Hx    • Malig Hyperthermia Neg Hx         Review of Systems  Constitutional:  Denies fever or chills   Eyes:  Denies change in visual acuity   HEENT:  Denies nasal congestion or sore throat   Respiratory:  Denies cough or shortness of breath   Cardiovascular:  Denies chest pain or edema   GI:  Denies abdominal pain, nausea, vomiting, bloody stools or diarrhea   :  Denies dysuria   Musculoskeletal:  Denies back pain or joint pain   Integument:  Denies rash   Neurologic:  Denies headache, focal weakness or sensory changes   Endocrine:  Denies polyuria or polydipsia   Lymphatic:  Denies swollen glands   Psychiatric:  Denies depression or anxiety       Objective:  tMax 24 hrs: Temp (24hrs), Av.1 °F (36.7 °C), Min:98.1 °F (36.7 °C), Max:98.1 °F (36.7 °C)    Vitals Ranges:   Temp:  [98.1 °F (36.7 °C)] 98.1 °F (36.7 °C)  Heart  "Rate:  [88] 88  Resp:  [18] 18  BP: (157)/(70) 157/70    Exam:  Vital Signs: /70 (BP Location: Right arm, Patient Position: Lying)   Pulse 88   Temp 98.1 °F (36.7 °C) (Oral)   Resp 18   Ht 154.9 cm (61\")   Wt 72.1 kg (158 lb 15.2 oz)   SpO2 97%   BMI 30.03 kg/m²   Constitutional:  Well developed, well nourished, no acute distress, non-toxic appearance    GI:  Soft, nondistended, normal bowel sounds, nontender, no organomegaly, no mass, no rebound, no guarding   :  No costovertebral angle tenderness   Musculoskeletal:  No edema, no tenderness, no deformities. Back- no tenderness  Integument:  Well hydrated, no rash   Lymphatic:  No lymphadenopathy noted   Neurologic:  Alert & oriented x 3,  Pelvic:       Assessment:  Stress urinary incontinence        Plan:  Pubovaginal sling    Janneth Matamoros MD  9/21/2021    "

## 2021-10-04 DIAGNOSIS — E78.2 MIXED HYPERLIPIDEMIA: ICD-10-CM

## 2021-10-04 RX ORDER — SIMVASTATIN 40 MG
TABLET ORAL
Qty: 90 TABLET | Refills: 3 | Status: SHIPPED | OUTPATIENT
Start: 2021-10-04 | End: 2022-09-29

## 2021-11-24 DIAGNOSIS — R80.9 TYPE 2 DIABETES MELLITUS WITH MICROALBUMINURIA, WITHOUT LONG-TERM CURRENT USE OF INSULIN (HCC): Chronic | ICD-10-CM

## 2021-11-24 DIAGNOSIS — E11.29 TYPE 2 DIABETES MELLITUS WITH MICROALBUMINURIA, WITHOUT LONG-TERM CURRENT USE OF INSULIN (HCC): Chronic | ICD-10-CM

## 2021-11-24 RX ORDER — SEMAGLUTIDE 1.34 MG/ML
INJECTION, SOLUTION SUBCUTANEOUS
Qty: 6 PEN | Refills: 3 | Status: SHIPPED | OUTPATIENT
Start: 2021-11-24 | End: 2022-04-15 | Stop reason: SDUPTHER

## 2021-12-02 DIAGNOSIS — E11.9 TYPE 2 DIABETES MELLITUS WITHOUT COMPLICATION, WITHOUT LONG-TERM CURRENT USE OF INSULIN (HCC): Chronic | ICD-10-CM

## 2021-12-02 DIAGNOSIS — I10 ESSENTIAL HYPERTENSION: Chronic | ICD-10-CM

## 2021-12-02 RX ORDER — METFORMIN HYDROCHLORIDE 500 MG/1
TABLET, EXTENDED RELEASE ORAL
Qty: 360 TABLET | Refills: 3 | Status: SHIPPED | OUTPATIENT
Start: 2021-12-02 | End: 2022-12-27

## 2021-12-02 RX ORDER — CARVEDILOL 3.12 MG/1
TABLET ORAL
Qty: 180 TABLET | Refills: 3 | Status: SHIPPED | OUTPATIENT
Start: 2021-12-02 | End: 2022-12-14

## 2022-01-04 ENCOUNTER — APPOINTMENT (OUTPATIENT)
Dept: WOMENS IMAGING | Facility: HOSPITAL | Age: 72
End: 2022-01-04

## 2022-01-04 PROCEDURE — 77063 BREAST TOMOSYNTHESIS BI: CPT | Performed by: RADIOLOGY

## 2022-01-04 PROCEDURE — 77067 SCR MAMMO BI INCL CAD: CPT | Performed by: RADIOLOGY

## 2022-01-27 DIAGNOSIS — R80.9 TYPE 2 DIABETES MELLITUS WITH MICROALBUMINURIA, WITHOUT LONG-TERM CURRENT USE OF INSULIN: Chronic | ICD-10-CM

## 2022-01-27 DIAGNOSIS — I10 ESSENTIAL HYPERTENSION: Chronic | ICD-10-CM

## 2022-01-27 DIAGNOSIS — E11.29 TYPE 2 DIABETES MELLITUS WITH MICROALBUMINURIA, WITHOUT LONG-TERM CURRENT USE OF INSULIN: Chronic | ICD-10-CM

## 2022-01-27 RX ORDER — LISINOPRIL 30 MG/1
TABLET ORAL
Qty: 90 TABLET | Refills: 3 | Status: SHIPPED | OUTPATIENT
Start: 2022-01-27 | End: 2022-11-18 | Stop reason: DRUGHIGH

## 2022-02-15 ENCOUNTER — OFFICE VISIT (OUTPATIENT)
Dept: INTERNAL MEDICINE | Age: 72
End: 2022-02-15

## 2022-02-15 VITALS
SYSTOLIC BLOOD PRESSURE: 126 MMHG | WEIGHT: 165 LBS | OXYGEN SATURATION: 99 % | DIASTOLIC BLOOD PRESSURE: 74 MMHG | BODY MASS INDEX: 31.15 KG/M2 | TEMPERATURE: 97.3 F | HEART RATE: 84 BPM | HEIGHT: 61 IN

## 2022-02-15 DIAGNOSIS — E78.2 MIXED HYPERLIPIDEMIA: Chronic | ICD-10-CM

## 2022-02-15 DIAGNOSIS — E11.29 TYPE 2 DIABETES MELLITUS WITH MICROALBUMINURIA, WITHOUT LONG-TERM CURRENT USE OF INSULIN: Primary | Chronic | ICD-10-CM

## 2022-02-15 DIAGNOSIS — R80.9 TYPE 2 DIABETES MELLITUS WITH MICROALBUMINURIA, WITHOUT LONG-TERM CURRENT USE OF INSULIN: Primary | Chronic | ICD-10-CM

## 2022-02-15 DIAGNOSIS — I10 PRIMARY HYPERTENSION: Chronic | ICD-10-CM

## 2022-02-15 PROCEDURE — 99214 OFFICE O/P EST MOD 30 MIN: CPT | Performed by: INTERNAL MEDICINE

## 2022-02-15 PROCEDURE — 90662 IIV NO PRSV INCREASED AG IM: CPT | Performed by: INTERNAL MEDICINE

## 2022-02-15 PROCEDURE — G0008 ADMIN INFLUENZA VIRUS VAC: HCPCS | Performed by: INTERNAL MEDICINE

## 2022-02-15 NOTE — PATIENT INSTRUCTIONS
** IMPORTANT MESSAGE FROM DR. LÓPEZ **    In our office, your satisfaction is VERY important to us.     You may receive a survey from [facility] by mail or E-mail for you to provide feedback about your visit. This information is invaluable for me to know what we can do to improve our services.     I ask that you please take a few minutes to complete the survey and let us know how we are doing in serving your needs. (You may receive the survey more than once for multiple visits)    Thank You !    Dr. López    _________________________________________________________________________________________________________________________      ** ADDITIONAL INSTRUCTION / REMINDERS FROM DR. LÓPEZ **    OBTAIN THESE VACCINES AT THE PHARMACY:  Shingrix (shingles)

## 2022-02-15 NOTE — PROGRESS NOTES
"    I N T E R N A L  M E D I C I N E  J U N O H  K I M,  M D      ENCOUNTER DATE:  02/15/2022    Kimi A Alberto / 71 y.o. / female      CHIEF COMPLAINT / REASON FOR OFFICE VISIT     Diabetes      ASSESSMENT & PLAN     Problem List Items Addressed This Visit        High    Type 2 diabetes mellitus with renal complication (HCC) - Primary (Chronic)    Overview     Complications: +microalbuminuria (5/2018)    Continue Ozempic 0.5 mg, metformin  mg 2 BID, and pioglitazone 15 mg.          Relevant Medications    pioglitazone (ACTOS) 15 MG tablet    Ozempic, 0.25 or 0.5 MG/DOSE, 2 MG/1.5ML solution pen-injector    metFORMIN ER (GLUCOPHAGE-XR) 500 MG 24 hr tablet    lisinopril (PRINIVIL,ZESTRIL) 30 MG tablet    Other Relevant Orders    Hemoglobin A1c       Medium    Hyperlipidemia (Chronic)    Overview     Continue simvastatin 40 mg daily.          Relevant Medications    Omega-3 Fatty Acids (FISH OIL) 1000 MG capsule capsule    simvastatin (ZOCOR) 40 MG tablet    Other Relevant Orders    Lipid Panel With / Chol / HDL Ratio    Hypertension (Chronic)    Overview     Continue lisinopril 30 mg and carvedilol 3.125 mg BID.          Relevant Medications    carvedilol (COREG) 3.125 MG tablet    lisinopril (PRINIVIL,ZESTRIL) 30 MG tablet        Orders Placed This Encounter   Procedures   • Fluzone High-Dose 65+yrs (3078-8724)   • Hemoglobin A1c   • Lipid Panel With / Chol / HDL Ratio     No orders of the defined types were placed in this encounter.      SUMMARY/DISCUSSION  •       Next Appointment with me: Visit date not found    Return in about 5 months (around 7/15/2022) for COMBINED AWV AND MEDICAL F/U (15 MIN).        VITAL SIGNS     Visit Vitals  /74 (BP Location: Left arm)   Pulse 84   Temp 97.3 °F (36.3 °C)   Ht 154.9 cm (61\")   Wt 74.8 kg (165 lb)   SpO2 99%   BMI 31.18 kg/m²       BP Readings from Last 3 Encounters:   02/15/22 126/74   09/21/21 158/81   09/17/21 142/73     Wt Readings from Last 3 Encounters: "   02/15/22 74.8 kg (165 lb)   09/21/21 72.1 kg (158 lb 15.2 oz)   09/17/21 71.2 kg (157 lb)     Body mass index is 31.18 kg/m².      MEDICATIONS AT THE TIME OF OFFICE VISIT     Current Outpatient Medications on File Prior to Visit   Medication Sig   • carvedilol (COREG) 3.125 MG tablet TAKE ONE TABLET BY MOUTH TWICE A DAY WITH MEALS   • cyancobalamin (VITAMIN B-12) 100 MCG tablet Take  by mouth daily. As directed   • GLUCOSAMINE-CHONDROITIN PO Take 1 tablet by mouth. HOLD PRIOR TO SURGERY   • lisinopril (PRINIVIL,ZESTRIL) 30 MG tablet TAKE ONE TABLET BY MOUTH DAILY   • Melatonin 10 MG tablet Take 10 mg by mouth Every Evening.   • metFORMIN ER (GLUCOPHAGE-XR) 500 MG 24 hr tablet TAKE TWO TABLETS BY MOUTH TWICE A DAY   • Multiple Vitamins-Minerals (MULTIVITAMIN ADULT PO) Take 1 tablet by mouth Daily. HOLD PRIOR TO SURGERY   • Multiple Vitamins-Minerals (OCUVITE ADULT 50+) capsule Take 1 capsule by mouth Daily. HOLD PRIOR TO SURGERY   • Omega-3 Fatty Acids (FISH OIL) 1000 MG capsule capsule Take 3 capsules by mouth Daily. HOLD PRIOR TO SURGERY   • Ozempic, 0.25 or 0.5 MG/DOSE, 2 MG/1.5ML solution pen-injector DIAL AND INJECT UNDER THE SKIN 0.5 MG WEEKLY INTO THE APPROPRIATE AREA AS DIRECTED   • pioglitazone (ACTOS) 15 MG tablet TAKE ONE TABLET BY MOUTH EVERY MORNING   • simvastatin (ZOCOR) 40 MG tablet TAKE ONE TABLET BY MOUTH ONCE NIGHTLY     No current facility-administered medications on file prior to visit.         HISTORY OF PRESENT ILLNESS     Diabetes remains stable at home on current medications without significant side effects. Her weight is up a little bit since last visit. She denies any other significant problems or changes.    Lab Results   Component Value Date    HGBA1C 5.8 08/13/2021    HGBA1C 5.90 (H) 04/06/2021    HGBA1C 6.2 11/11/2020    CREATININE 0.53 (L) 09/17/2021    LDL 38 04/06/2021    MICROALBUR 8.9 04/06/2021      Wt Readings from Last 3 Encounters:   02/15/22 74.8 kg (165 lb)   09/21/21 72.1  kg (158 lb 15.2 oz)   09/17/21 71.2 kg (157 lb)         REVIEW OF SYSTEMS     Constitutional neg except per HPI   Resp neg  CV neg   GI neg       PHYSICAL EXAMINATION     Physical Exam  General: No acute distress  Psych: Normal thought and judgment   Cardiovascular Rate: normal. Rhythm: regular. Heart sounds: normal.   Pulm/Chest: Effort normal, breath sounds normal.   Trace BLE edema.       REVIEWED DATA     Labs:       Lab Results   Component Value Date     09/17/2021    K 4.1 09/17/2021    CALCIUM 9.9 09/17/2021    AST 13 04/06/2021    ALT 14 04/06/2021    BUN 14 09/17/2021    CREATININE 0.53 (L) 09/17/2021    CREATININE 0.62 04/06/2021    CREATININE 0.66 07/08/2020    EGFRIFNONA 114 09/17/2021    EGFRIFAFRI 116 04/06/2021       Lab Results   Component Value Date    HGBA1C 5.8 08/13/2021    HGBA1C 5.90 (H) 04/06/2021    HGBA1C 6.2 11/11/2020       Lab Results   Component Value Date    LDL 38 04/06/2021    LDL 52 07/08/2020    HDL 52 04/06/2021    TRIG 83 04/06/2021       Lab Results   Component Value Date    TSH 1.120 04/06/2021    TSH 1.450 05/07/2018    TSH 1.540 05/30/2017    FREET4 1.49 04/06/2021    FREET4 1.38 05/07/2018    FREET4 1.46 05/30/2017       Lab Results   Component Value Date    WBC 6.67 09/17/2021    HGB 12.9 09/17/2021     09/17/2021       Lab Results   Component Value Date    MICROALBUR 8.9 04/06/2021           Imaging:           Medical Tests:           Summary of old records / correspondence / consultant report:           Request outside records:           *Examiner was wearing KN95 mask and eye protection during the entire duration of the visit. Patient was masked the entire time. Minimum social distance of 6 ft maintained entire visit except if physical contact was necessary as documented.       Template created by Jarrod Leal MD

## 2022-02-16 LAB
CHOLEST SERPL-MCNC: 122 MG/DL (ref 100–199)
CHOLEST/HDLC SERPL: 2.3 RATIO (ref 0–4.4)
HBA1C MFR BLD: 6.1 % (ref 4.8–5.6)
HDLC SERPL-MCNC: 54 MG/DL
LDLC SERPL CALC-MCNC: 51 MG/DL (ref 0–99)
TRIGL SERPL-MCNC: 85 MG/DL (ref 0–149)
VLDLC SERPL CALC-MCNC: 17 MG/DL (ref 5–40)

## 2022-02-16 NOTE — PROGRESS NOTES
MyChart:    Here are the result(s) of your test(s):     1. A1c for average glucose level is little higher.   2. Cholesterol level is overall stable     Please do not hesitate to contact me if you have questions.

## 2022-04-15 DIAGNOSIS — R80.9 TYPE 2 DIABETES MELLITUS WITH MICROALBUMINURIA, WITHOUT LONG-TERM CURRENT USE OF INSULIN: Chronic | ICD-10-CM

## 2022-04-15 DIAGNOSIS — E11.29 TYPE 2 DIABETES MELLITUS WITH MICROALBUMINURIA, WITHOUT LONG-TERM CURRENT USE OF INSULIN: Chronic | ICD-10-CM

## 2022-04-15 RX ORDER — SEMAGLUTIDE 1.34 MG/ML
0.5 INJECTION, SOLUTION SUBCUTANEOUS WEEKLY
Qty: 4.5 ML | Refills: 0 | Status: SHIPPED | OUTPATIENT
Start: 2022-04-15 | End: 2022-08-17 | Stop reason: SDUPTHER

## 2022-07-15 ENCOUNTER — OFFICE VISIT (OUTPATIENT)
Dept: INTERNAL MEDICINE | Age: 72
End: 2022-07-15

## 2022-07-15 VITALS
WEIGHT: 162 LBS | DIASTOLIC BLOOD PRESSURE: 70 MMHG | OXYGEN SATURATION: 99 % | HEART RATE: 91 BPM | BODY MASS INDEX: 30.58 KG/M2 | HEIGHT: 61 IN | TEMPERATURE: 97.3 F | SYSTOLIC BLOOD PRESSURE: 130 MMHG

## 2022-07-15 DIAGNOSIS — E11.29 TYPE 2 DIABETES MELLITUS WITH MICROALBUMINURIA, WITHOUT LONG-TERM CURRENT USE OF INSULIN: Chronic | ICD-10-CM

## 2022-07-15 DIAGNOSIS — E78.2 MIXED HYPERLIPIDEMIA: Chronic | ICD-10-CM

## 2022-07-15 DIAGNOSIS — R80.9 TYPE 2 DIABETES MELLITUS WITH MICROALBUMINURIA, WITHOUT LONG-TERM CURRENT USE OF INSULIN: Chronic | ICD-10-CM

## 2022-07-15 DIAGNOSIS — I10 PRIMARY HYPERTENSION: Chronic | ICD-10-CM

## 2022-07-15 DIAGNOSIS — Z00.00 ENCOUNTER FOR ANNUAL WELLNESS EXAM IN MEDICARE PATIENT: Primary | ICD-10-CM

## 2022-07-15 PROBLEM — R32 URINARY INCONTINENCE: Status: ACTIVE | Noted: 2022-07-15

## 2022-07-15 PROBLEM — N81.6 RECTOCELE: Status: ACTIVE | Noted: 2018-04-17

## 2022-07-15 PROBLEM — S83.209A ACUTE MENISCAL TEAR OF KNEE: Status: ACTIVE | Noted: 2022-07-15

## 2022-07-15 PROCEDURE — G0439 PPPS, SUBSEQ VISIT: HCPCS

## 2022-07-15 PROCEDURE — 1159F MED LIST DOCD IN RCRD: CPT

## 2022-07-15 PROCEDURE — 1170F FXNL STATUS ASSESSED: CPT

## 2022-07-15 NOTE — PROGRESS NOTES
I N T E R N A L  M E D I C I N E  [facility]      ENCOUNTER DATE:  07/15/2022    Kimi Dominguez / 72 y.o. / female        MEDICARE ANNUAL WELLNESS VISIT       Chief Complaint: Medicare Wellness-subsequent       Patient's general assessment of her health since a year ago:     - Compared to one year ago, she feels her physical health is about the same without significant change.    - Compared to one year ago, she feels her mental health is about the same without significant change.      HPI for other active medical problems:     HTN: Carvedilol 3.125 mg BID.  Lisinopril 30 mg daily.  BP controlled today, 130/70.  Does not check at home.    Type 2 diabetes: Metformin 1000 mg BID, Ozempic 0.5 mg weekly, actos 15 mg daily.  Fasting blood sugars averaging 100s.      HLD: simvastatin 40 mg daily.      Mammogram next due 01/04/2023.  DEXA scan performed on 08/04/2021.  Colonoscopy next due 10/04/2028.        HISTORY       Recent Hospitalizations:    Recent hospitalization?: NO    If YES, location, date, and diagnoses:     · Location:   · Date:   · Principle Discharge Dx:   · Secondary Dx:       Patient Care Team:    Patient Care Team:  Nura Leal MD as PCP - General (Internal Medicine)  Jess Vo MD as Consulting Physician (Dermatology)  Latha Holloway APRN as Nurse Practitioner (Obstetrics and Gynecology)      Allergies:  Patient has no known allergies.    Medications:  Current Outpatient Medications on File Prior to Visit   Medication Sig Dispense Refill   • carvedilol (COREG) 3.125 MG tablet TAKE ONE TABLET BY MOUTH TWICE A DAY WITH MEALS 180 tablet 3   • cyancobalamin (VITAMIN B-12) 100 MCG tablet Take  by mouth daily. As directed     • GLUCOSAMINE-CHONDROITIN PO Take 1 tablet by mouth. HOLD PRIOR TO SURGERY     • lisinopril (PRINIVIL,ZESTRIL) 30 MG tablet TAKE ONE TABLET BY MOUTH DAILY 90 tablet 3   • Melatonin 10 MG tablet Take 10 mg by mouth Every Evening.     • metFORMIN ER (GLUCOPHAGE-XR)  500 MG 24 hr tablet TAKE TWO TABLETS BY MOUTH TWICE A  tablet 3   • Multiple Vitamins-Minerals (MULTIVITAMIN ADULT PO) Take 1 tablet by mouth Daily. HOLD PRIOR TO SURGERY     • Multiple Vitamins-Minerals (OCUVITE ADULT 50+) capsule Take 1 capsule by mouth Daily. HOLD PRIOR TO SURGERY     • Omega-3 Fatty Acids (FISH OIL) 1000 MG capsule capsule Take 3 capsules by mouth Daily. HOLD PRIOR TO SURGERY     • Ozempic, 0.25 or 0.5 MG/DOSE, 2 MG/1.5ML solution pen-injector Inject 0.5 mg under the skin into the appropriate area as directed 1 (One) Time Per Week. 4.5 mL 0   • pioglitazone (ACTOS) 15 MG tablet TAKE ONE TABLET BY MOUTH EVERY MORNING 90 tablet 3   • simvastatin (ZOCOR) 40 MG tablet TAKE ONE TABLET BY MOUTH ONCE NIGHTLY 90 tablet 3     No current facility-administered medications on file prior to visit.        PFSH:     The following portions of the patient's history were reviewed and updated as appropriate: Allergies / Current Medications / Past Medical History / Surgical History / Social History / Family History    Problem List:  Patient Active Problem List   Diagnosis   • Hyperlipidemia   • Hypertension   • Obesity (BMI 30-39.9)   • Primary osteoarthritis involving multiple joints   • Type 2 diabetes mellitus with renal complication (HCC)   • Cobalamin deficiency   • Lymphocytic colitis   • Mixed stress and urge urinary incontinence   • Female stress incontinence   • Acute meniscal tear of knee   • Rectocele   • Urinary incontinence       Past Medical History:  Past Medical History:   Diagnosis Date   • B12 deficiency    • Diabetes mellitus (HCC)    • Female stress incontinence    • Hyperlipidemia    • Hypertension    • Loose stools    • Obesity    • Rectocele 05/02/2018   • Shingles     left eye   • Skin cancer    • Urinary incontinence        Past Surgical History:  Past Surgical History:   Procedure Laterality Date   • COLONOSCOPY  2008   • COLONOSCOPY N/A 10/4/2018    Procedure: COLONOSCOPY to Cecum  "WITH BIOPSY;  Surgeon: Geovanna Rebollar MD;  Location: [facility];  Service: General   • KNEE ARTHROSCOPY Left    • PAP SMEAR     • POSTERIOR VAGINAL REPAIR  2018    for rectocele   • PUBOVAGINAL SLING N/A 2021    Procedure: pubovaginal sling cystourethroscopy;  Surgeon: Janneth Matamoros MD;  Location: [facility];  Service: Gynecology;  Laterality: N/A;   • SKIN CANCER DESTRUCTION      basal cells removed on nose bridge, left thigh squamous removed   • TUBAL ABDOMINAL LIGATION         Social History:  Social History     Socioeconomic History   • Marital status:      Spouse name: Avtar Srinivasan)   • Number of children: 3   Tobacco Use   • Smoking status: Never Smoker   • Smokeless tobacco: Never Used   Vaping Use   • Vaping Use: Never used   Substance and Sexual Activity   • Alcohol use: Yes     Comment: RARELY   • Drug use: No   • Sexual activity: Not Currently       Family History:  Family History   Problem Relation Age of Onset   • Coronary artery disease Father          82   • Prostate cancer Father    • Breast cancer Maternal Aunt    • Diabetes Paternal Grandmother         Type 2   • Stroke Mother    • No Known Problems Sister    • Thyroid cancer Neg Hx    • Colon cancer Neg Hx    • Malig Hyperthermia Neg Hx          PATIENT ASSESSMENT     Vitals:  Vitals:    07/15/22 0737   BP: 130/70   Pulse: 91   Temp: 97.3 °F (36.3 °C)   TempSrc: Temporal   SpO2: 99%   Weight: 73.5 kg (162 lb)   Height: 154.9 cm (60.98\")       BP Readings from Last 3 Encounters:   07/15/22 130/70   02/15/22 126/74   21 158/81     Wt Readings from Last 3 Encounters:   07/15/22 73.5 kg (162 lb)   02/15/22 74.8 kg (165 lb)   21 72.1 kg (158 lb 15.2 oz)      Body mass index is 30.63 kg/m².    [unfilled]        Review of Systems:    Review of Systems   Constitutional: Negative for chills, fatigue and fever.   Eyes: Negative for visual disturbance.   Respiratory: Negative for cough, chest tightness, " shortness of breath and wheezing.    Cardiovascular: Negative for chest pain, palpitations and leg swelling.   Gastrointestinal: Negative for abdominal pain and blood in stool.   Endocrine: Negative for polydipsia, polyphagia and polyuria.   Genitourinary: Negative for difficulty urinating.   Neurological: Negative for dizziness, weakness, light-headedness and headaches.   Psychiatric/Behavioral: Negative for dysphoric mood. The patient is not nervous/anxious.          Physical Exam:    Physical Exam  Vitals reviewed.   Constitutional:       General: She is not in acute distress.     Appearance: Normal appearance. She is not ill-appearing or toxic-appearing.   HENT:      Head: Normocephalic and atraumatic.      Right Ear: Tympanic membrane, ear canal and external ear normal. There is no impacted cerumen.      Left Ear: Tympanic membrane, ear canal and external ear normal. There is no impacted cerumen.      Nose: Nose normal. No congestion or rhinorrhea.      Mouth/Throat:      Mouth: Mucous membranes are moist.      Pharynx: Oropharynx is clear. No oropharyngeal exudate or posterior oropharyngeal erythema.   Eyes:      Extraocular Movements: Extraocular movements intact.      Conjunctiva/sclera: Conjunctivae normal.      Pupils: Pupils are equal, round, and reactive to light.   Cardiovascular:      Rate and Rhythm: Normal rate and regular rhythm.      Heart sounds: Normal heart sounds.   Pulmonary:      Effort: Pulmonary effort is normal. No respiratory distress.      Breath sounds: Normal breath sounds.   Abdominal:      General: Bowel sounds are normal.      Palpations: Abdomen is soft.      Tenderness: There is no abdominal tenderness.   Musculoskeletal:         General: No swelling or deformity. Normal range of motion.      Cervical back: Normal range of motion and neck supple.      Right lower leg: No edema.      Left lower leg: No edema.   Feet:      Right foot:      Protective Sensation: 10 sites tested. 10  sites sensed.      Left foot:      Protective Sensation: 10 sites tested. 10 sites sensed.   Lymphadenopathy:      Cervical: No cervical adenopathy.   Skin:     General: Skin is warm and dry.   Neurological:      General: No focal deficit present.      Mental Status: She is alert and oriented to person, place, and time. Mental status is at baseline.   Psychiatric:         Mood and Affect: Mood normal.         Behavior: Behavior normal.         Thought Content: Thought content normal.         Judgment: Judgment normal.           Reviewed Data:    Labs:   Lab Results   Component Value Date     09/17/2021    K 4.1 09/17/2021    CALCIUM 9.9 09/17/2021    AST 13 04/06/2021    ALT 14 04/06/2021    BUN 14 09/17/2021    CREATININE 0.53 (L) 09/17/2021    CREATININE 0.62 04/06/2021    CREATININE 0.66 07/08/2020    EGFRIFNONA 114 09/17/2021    EGFRIFAFRI 116 04/06/2021       Lab Results   Component Value Date     (H) 04/27/2018    HGBA1C 6.1 (H) 02/15/2022    HGBA1C 5.8 08/13/2021    HGBA1C 5.90 (H) 04/06/2021    MICROALBUR 8.9 04/06/2021       Lab Results   Component Value Date    LDL 51 02/15/2022    LDL 38 04/06/2021    LDL 52 07/08/2020    HDL 54 02/15/2022    TRIG 85 02/15/2022    CHOLHDLRATIO 2.3 02/15/2022       Lab Results   Component Value Date    TSH 1.120 04/06/2021    FREET4 1.49 04/06/2021          Lab Results   Component Value Date    WBC 6.67 09/17/2021    HGB 12.9 09/17/2021    HGB 13.2 04/06/2021    HGB 10.9 (L) 05/03/2018     09/17/2021                 No results found for: PSA    Imaging:          Medical Tests:          Screening for Glaucoma:  Previous screening for glaucoma?: Yes      Hearing Loss Screen:  Finger Rub Hearing Test (right ear): passed  Finger Rub Hearing Test (left ear): passed      Urinary Incontinence Screen:  Episodes of urinary incontinence? : Yes, prior pelvic floor surgery without benefit, wears pads      Depression Screen:  PHQ-2/PHQ-9 Depression Screening  7/15/2022   Retired PHQ-9 Total Score -   Retired Total Score -   Little Interest or Pleasure in Doing Things 0-->not at all   Feeling Down, Depressed or Hopeless 0-->not at all   PHQ-9: Brief Depression Severity Measure Score 0        PHQ-2: 0 (Not depressed)    PHQ-9: 0 (Negative screening for depression)       FUNCTIONAL, FALL RISK, & COGNITIVE SCREENING (Components below):    DATA:    Functional & Cognitive Status 7/15/2022   Do you have difficulty preparing food and eating? No   Do you have difficulty bathing yourself, getting dressed or grooming yourself? No   Do you have difficulty using the toilet? No   Do you have difficulty moving around from place to place? No   Do you have trouble with steps or getting out of a bed or a chair? No   Current Diet Well Balanced Diet   Dental Exam Up to date   Eye Exam Up to date   Exercise (times per week) 3 times per week   Current Exercises Include Walking   Current Exercise Activities Include -   Do you need help using the phone?  No   Are you deaf or do you have serious difficulty hearing?  No   Do you need help with transportation? No   Do you need help shopping? No   Do you need help preparing meals?  No   Do you need help with housework?  No   Do you need help with laundry? No   Do you need help taking your medications? No   Do you need help managing money? No   Do you ever drive or ride in a car without wearing a seat belt? No   Do you have difficulty concentrating, remembering or making decisions? No         A) Assessment of Functional Ability:  (Assessment of ability to perform ADL's (showering/bathing, using toilet, dressing, feeding self, moving self around) and IADL's (use telephone, shop, prepare food, housekeep, do laundry, transport independently, take medications independently, and handle finances)    Degree of functional impairment: NONE (based on assessment noted above)      B) Assessment of Fall Risk:  Fall Risk Assessment was completed, and patient is  at low risk for falls.       Need for further evaluation of gait, strength, and balance? : No    Timed Up and Go (TUG):   (>= 12 seconds indicates high risk for falling)    Observable abnormalities included: Normal gait pattern       C. Assessment of Cognitive Function:    Mini-Cog Test:     1) Registration (3 objects): Yes   2) Number of objects recalled: 3   3) Clock Draw: Passed? : Yes       Further evaluation required? : No        COUNSELING       A. Identification of Health Risk Factors:    Risk factors include: cardiovascular risk factors and incontinence      B. Age-Appropriate Screening Schedule:  (Refer to the list below for future screening recommendations based on patient's age, sex and/or medical conditions. Orders for these recommended tests are listed in the plan section. The patient has been provided with a written plan)    Health Maintenance Topics  Health Maintenance   Topic Date Due   • URINE MICROALBUMIN  04/06/2022   • HEMOGLOBIN A1C  08/15/2022   • DIABETIC EYE EXAM  08/30/2022   • INFLUENZA VACCINE  10/01/2022   • PAP SMEAR  11/11/2022   • LIPID PANEL  02/15/2023   • DIABETIC FOOT EXAM  07/15/2023   • DXA SCAN  08/04/2023   • MAMMOGRAM  01/04/2024   • TDAP/TD VACCINES (2 - Td or Tdap) 06/04/2024   • ZOSTER VACCINE  Addressed       Health Maintenance Topics Due or Over-Due  Health Maintenance Due   Topic Date Due   • URINE MICROALBUMIN  04/06/2022         C. Advanced Care Planning:    Advance Care Planning   ACP discussion was held with the patient during this visit. Patient has an advance directive (not in EMR), copy requested.       D. Patient Self-Management and Personalized Health Advice:    She has been provided with personalized counseling/information (including brochures/handouts) about:     -- optimizing diet/nutrition plans, improving exercise / conditioning, weight management and recommended hearing testing      She has been recommended for the following preventative services which has  been performed today, will be ordered today or ordered/performed on upcoming follow-up visit:     -- NUTRITION counseling provided, EXERCISE counseling provided, EYE exam for glaucoma screening recommended      E. Miscellaneous Items:    -Aspirin use counseling: Does not need ASA (and currently is not on it)    -Discussed BMI with her. The BMI is above average; BMI management plan is completed (discussed plans for weight loss)    -Reviewed use of high risk medication in the elderly: YES    -Reviewed for potential of harmful drug interactions in the elderly: YES        WRAP UP       Assessment & Plan:    1) MEDICARE ANNUAL WELLNESS VISIT    2) OTHER MEDICAL CONDITIONS ADDRESSED TODAY:            Problem List Items Addressed This Visit        Cardiac and Vasculature    Hyperlipidemia (Chronic)    Overview     Continue simvastatin 40 mg daily.            Relevant Medications    Omega-3 Fatty Acids (FISH OIL) 1000 MG capsule capsule    simvastatin (ZOCOR) 40 MG tablet    Other Relevant Orders    Lipid Panel With / Chol / HDL Ratio    Hypertension (Chronic)    Overview     Continue lisinopril 30 mg and carvedilol 3.125 mg BID.            Relevant Medications    carvedilol (COREG) 3.125 MG tablet    lisinopril (PRINIVIL,ZESTRIL) 30 MG tablet    Other Relevant Orders    CBC & Differential    Comprehensive Metabolic Panel    TSH+Free T4    Urinalysis With Microscopic If Indicated (No Culture) - Urine, Clean Catch       Endocrine and Metabolic    Type 2 diabetes mellitus with renal complication (HCC) (Chronic)    Overview     Complications: +microalbuminuria (5/2018)    Continue Ozempic 0.5 mg, metformin  mg 2 BID, and pioglitazone 15 mg.            Relevant Medications    pioglitazone (ACTOS) 15 MG tablet    metFORMIN ER (GLUCOPHAGE-XR) 500 MG 24 hr tablet    lisinopril (PRINIVIL,ZESTRIL) 30 MG tablet    Ozempic, 0.25 or 0.5 MG/DOSE, 2 MG/1.5ML solution pen-injector    Other Relevant Orders    Hemoglobin A1c     Microalbumin / Creatinine Urine Ratio - Urine, Clean Catch      Other Visit Diagnoses     Encounter for annual wellness exam in Medicare patient    -  Primary                    Orders Placed This Encounter   Procedures   • Comprehensive Metabolic Panel   • Hemoglobin A1c   • Lipid Panel With / Chol / HDL Ratio   • TSH+Free T4   • Urinalysis With Microscopic If Indicated (No Culture) - Urine, Clean Catch   • Microalbumin / Creatinine Urine Ratio - Urine, Clean Catch   • CBC & Differential       Discussion / Summary:    Pt is doing well at today's appointment.  No complaints.  Will consider adjusting medications based on results of labs.    Next follow up scheduled in 4 months for chronic care management.    Pt to reach out sooner with any new or concerning symptoms.      Medications as of TODAY:              Current Outpatient Medications   Medication Sig Dispense Refill   • carvedilol (COREG) 3.125 MG tablet TAKE ONE TABLET BY MOUTH TWICE A DAY WITH MEALS 180 tablet 3   • cyancobalamin (VITAMIN B-12) 100 MCG tablet Take  by mouth daily. As directed     • GLUCOSAMINE-CHONDROITIN PO Take 1 tablet by mouth. HOLD PRIOR TO SURGERY     • lisinopril (PRINIVIL,ZESTRIL) 30 MG tablet TAKE ONE TABLET BY MOUTH DAILY 90 tablet 3   • Melatonin 10 MG tablet Take 10 mg by mouth Every Evening.     • metFORMIN ER (GLUCOPHAGE-XR) 500 MG 24 hr tablet TAKE TWO TABLETS BY MOUTH TWICE A  tablet 3   • Multiple Vitamins-Minerals (MULTIVITAMIN ADULT PO) Take 1 tablet by mouth Daily. HOLD PRIOR TO SURGERY     • Multiple Vitamins-Minerals (OCUVITE ADULT 50+) capsule Take 1 capsule by mouth Daily. HOLD PRIOR TO SURGERY     • Omega-3 Fatty Acids (FISH OIL) 1000 MG capsule capsule Take 3 capsules by mouth Daily. HOLD PRIOR TO SURGERY     • Ozempic, 0.25 or 0.5 MG/DOSE, 2 MG/1.5ML solution pen-injector Inject 0.5 mg under the skin into the appropriate area as directed 1 (One) Time Per Week. 4.5 mL 0   • pioglitazone (ACTOS) 15 MG tablet  TAKE ONE TABLET BY MOUTH EVERY MORNING 90 tablet 3   • simvastatin (ZOCOR) 40 MG tablet TAKE ONE TABLET BY MOUTH ONCE NIGHTLY 90 tablet 3     No current facility-administered medications for this visit.         FOLLOW-UP:            Return in about 4 months (around 11/15/2022) for Recheck (Chronic medical).                 Future Appointments   Date Time Provider Department Center   11/15/2022  7:30 AM Janie Douglas APRN [facility]           After Visit Summary (AVS) including the Personalized Prevention  Plan Services (PPPS) was either printed and given to the patient at check-out today and/or sent to [facility] for review.       [unfilled]

## 2022-07-16 LAB
ALBUMIN SERPL-MCNC: 4.4 G/DL (ref 3.7–4.7)
ALBUMIN/CREAT UR: <6 MG/G CREAT (ref 0–29)
ALBUMIN/GLOB SERPL: 1.6 {RATIO} (ref 1.2–2.2)
ALP SERPL-CCNC: 72 IU/L (ref 44–121)
ALT SERPL-CCNC: 17 IU/L (ref 0–32)
APPEARANCE UR: CLEAR
AST SERPL-CCNC: 18 IU/L (ref 0–40)
BASOPHILS # BLD AUTO: 0 X10E3/UL (ref 0–0.2)
BASOPHILS NFR BLD AUTO: 1 %
BILIRUB SERPL-MCNC: <0.2 MG/DL (ref 0–1.2)
BILIRUB UR QL STRIP: NEGATIVE
BUN SERPL-MCNC: 12 MG/DL (ref 8–27)
BUN/CREAT SERPL: 18 (ref 12–28)
CALCIUM SERPL-MCNC: 10 MG/DL (ref 8.7–10.3)
CHLORIDE SERPL-SCNC: 100 MMOL/L (ref 96–106)
CHOLEST SERPL-MCNC: 131 MG/DL (ref 100–199)
CHOLEST/HDLC SERPL: 2.1 RATIO (ref 0–4.4)
CO2 SERPL-SCNC: 25 MMOL/L (ref 20–29)
COLOR UR: YELLOW
CREAT SERPL-MCNC: 0.65 MG/DL (ref 0.57–1)
CREAT UR-MCNC: 48.2 MG/DL
EGFRCR SERPLBLD CKD-EPI 2021: 94 ML/MIN/1.73
EOSINOPHIL # BLD AUTO: 0.1 X10E3/UL (ref 0–0.4)
EOSINOPHIL NFR BLD AUTO: 2 %
ERYTHROCYTE [DISTWIDTH] IN BLOOD BY AUTOMATED COUNT: 13.6 % (ref 11.7–15.4)
GLOBULIN SER CALC-MCNC: 2.8 G/DL (ref 1.5–4.5)
GLUCOSE SERPL-MCNC: 103 MG/DL (ref 65–99)
GLUCOSE UR QL STRIP: NEGATIVE
HBA1C MFR BLD: 6.4 % (ref 4.8–5.6)
HCT VFR BLD AUTO: 41.4 % (ref 34–46.6)
HDLC SERPL-MCNC: 62 MG/DL
HGB BLD-MCNC: 13.1 G/DL (ref 11.1–15.9)
HGB UR QL STRIP: NEGATIVE
IMM GRANULOCYTES # BLD AUTO: 0 X10E3/UL (ref 0–0.1)
IMM GRANULOCYTES NFR BLD AUTO: 0 %
KETONES UR QL STRIP: NEGATIVE
LDLC SERPL CALC-MCNC: 58 MG/DL (ref 0–99)
LEUKOCYTE ESTERASE UR QL STRIP: NEGATIVE
LYMPHOCYTES # BLD AUTO: 2.4 X10E3/UL (ref 0.7–3.1)
LYMPHOCYTES NFR BLD AUTO: 30 %
MCH RBC QN AUTO: 26.8 PG (ref 26.6–33)
MCHC RBC AUTO-ENTMCNC: 31.6 G/DL (ref 31.5–35.7)
MCV RBC AUTO: 85 FL (ref 79–97)
MICRO URNS: NORMAL
MICROALBUMIN UR-MCNC: <3 UG/ML
MONOCYTES # BLD AUTO: 0.6 X10E3/UL (ref 0.1–0.9)
MONOCYTES NFR BLD AUTO: 7 %
NEUTROPHILS # BLD AUTO: 4.7 X10E3/UL (ref 1.4–7)
NEUTROPHILS NFR BLD AUTO: 60 %
NITRITE UR QL STRIP: NEGATIVE
PH UR STRIP: 6 [PH] (ref 5–7.5)
PLATELET # BLD AUTO: 341 X10E3/UL (ref 150–450)
POTASSIUM SERPL-SCNC: 4.8 MMOL/L (ref 3.5–5.2)
PROT SERPL-MCNC: 7.2 G/DL (ref 6–8.5)
PROT UR QL STRIP: NEGATIVE
RBC # BLD AUTO: 4.89 X10E6/UL (ref 3.77–5.28)
SODIUM SERPL-SCNC: 139 MMOL/L (ref 134–144)
SP GR UR STRIP: 1.01 (ref 1–1.03)
T4 FREE SERPL-MCNC: 1.53 NG/DL (ref 0.82–1.77)
TRIGL SERPL-MCNC: 44 MG/DL (ref 0–149)
TSH SERPL DL<=0.005 MIU/L-ACNC: 1.85 UIU/ML (ref 0.45–4.5)
UROBILINOGEN UR STRIP-MCNC: 0.2 MG/DL (ref 0.2–1)
VLDLC SERPL CALC-MCNC: 11 MG/DL (ref 5–40)
WBC # BLD AUTO: 7.8 X10E3/UL (ref 3.4–10.8)

## 2022-07-18 NOTE — PERIOPERATIVE NURSING NOTE
Pt ambulated to bathroom. Voided 300ml in potty hat. Post void residual 40ml.    What Is The Reason For Today's Visit?: Full Body Skin Examination What Is The Reason For Today's Visit? (Being Monitored For X): concerning skin lesions on an annual basis

## 2022-08-17 DIAGNOSIS — R80.9 TYPE 2 DIABETES MELLITUS WITH MICROALBUMINURIA, WITHOUT LONG-TERM CURRENT USE OF INSULIN: Chronic | ICD-10-CM

## 2022-08-17 DIAGNOSIS — E11.29 TYPE 2 DIABETES MELLITUS WITH MICROALBUMINURIA, WITHOUT LONG-TERM CURRENT USE OF INSULIN: Chronic | ICD-10-CM

## 2022-08-17 RX ORDER — SEMAGLUTIDE 1.34 MG/ML
0.5 INJECTION, SOLUTION SUBCUTANEOUS WEEKLY
Qty: 4.5 ML | Refills: 3 | Status: SHIPPED | OUTPATIENT
Start: 2022-08-17 | End: 2022-08-22 | Stop reason: SDUPTHER

## 2022-08-22 DIAGNOSIS — R80.9 TYPE 2 DIABETES MELLITUS WITH MICROALBUMINURIA, WITHOUT LONG-TERM CURRENT USE OF INSULIN: Chronic | ICD-10-CM

## 2022-08-22 DIAGNOSIS — E11.29 TYPE 2 DIABETES MELLITUS WITH MICROALBUMINURIA, WITHOUT LONG-TERM CURRENT USE OF INSULIN: Chronic | ICD-10-CM

## 2022-08-22 RX ORDER — SEMAGLUTIDE 1.34 MG/ML
0.5 INJECTION, SOLUTION SUBCUTANEOUS WEEKLY
Qty: 4.5 ML | Refills: 3 | Status: SHIPPED | OUTPATIENT
Start: 2022-08-22 | End: 2022-11-15

## 2022-08-24 DIAGNOSIS — E11.29 TYPE 2 DIABETES MELLITUS WITH MICROALBUMINURIA, WITHOUT LONG-TERM CURRENT USE OF INSULIN: Chronic | ICD-10-CM

## 2022-08-24 DIAGNOSIS — R80.9 TYPE 2 DIABETES MELLITUS WITH MICROALBUMINURIA, WITHOUT LONG-TERM CURRENT USE OF INSULIN: Chronic | ICD-10-CM

## 2022-08-25 RX ORDER — PIOGLITAZONEHYDROCHLORIDE 15 MG/1
TABLET ORAL
Qty: 90 TABLET | Refills: 3 | Status: SHIPPED | OUTPATIENT
Start: 2022-08-25

## 2022-09-29 DIAGNOSIS — E78.2 MIXED HYPERLIPIDEMIA: ICD-10-CM

## 2022-09-29 RX ORDER — SIMVASTATIN 40 MG
TABLET ORAL
Qty: 90 TABLET | Refills: 0 | Status: SHIPPED | OUTPATIENT
Start: 2022-09-29 | End: 2022-11-30

## 2022-11-15 ENCOUNTER — OFFICE VISIT (OUTPATIENT)
Dept: INTERNAL MEDICINE | Age: 72
End: 2022-11-15

## 2022-11-15 VITALS
WEIGHT: 168.4 LBS | SYSTOLIC BLOOD PRESSURE: 172 MMHG | OXYGEN SATURATION: 98 % | DIASTOLIC BLOOD PRESSURE: 82 MMHG | HEART RATE: 82 BPM | HEIGHT: 61 IN | BODY MASS INDEX: 31.79 KG/M2 | TEMPERATURE: 97.3 F

## 2022-11-15 DIAGNOSIS — E11.29 TYPE 2 DIABETES MELLITUS WITH MICROALBUMINURIA, WITHOUT LONG-TERM CURRENT USE OF INSULIN: Chronic | ICD-10-CM

## 2022-11-15 DIAGNOSIS — R80.9 TYPE 2 DIABETES MELLITUS WITH MICROALBUMINURIA, WITHOUT LONG-TERM CURRENT USE OF INSULIN: Chronic | ICD-10-CM

## 2022-11-15 DIAGNOSIS — I10 PRIMARY HYPERTENSION: Primary | Chronic | ICD-10-CM

## 2022-11-15 DIAGNOSIS — E78.2 MIXED HYPERLIPIDEMIA: Chronic | ICD-10-CM

## 2022-11-15 DIAGNOSIS — Z23 ENCOUNTER FOR IMMUNIZATION: ICD-10-CM

## 2022-11-15 LAB
ALBUMIN SERPL-MCNC: 4.2 G/DL (ref 3.5–5.2)
ALBUMIN/GLOB SERPL: 1.7 G/DL
ALP SERPL-CCNC: 65 U/L (ref 39–117)
ALT SERPL-CCNC: 15 U/L (ref 1–33)
AST SERPL-CCNC: 16 U/L (ref 1–32)
BASOPHILS # BLD AUTO: 0.04 10*3/MM3 (ref 0–0.2)
BASOPHILS NFR BLD AUTO: 0.8 % (ref 0–1.5)
BILIRUB SERPL-MCNC: 0.4 MG/DL (ref 0–1.2)
BUN SERPL-MCNC: 14 MG/DL (ref 8–23)
BUN/CREAT SERPL: 23.3 (ref 7–25)
CALCIUM SERPL-MCNC: 9.5 MG/DL (ref 8.6–10.5)
CHLORIDE SERPL-SCNC: 104 MMOL/L (ref 98–107)
CHOLEST SERPL-MCNC: 123 MG/DL (ref 0–200)
CHOLEST/HDLC SERPL: 1.95 {RATIO}
CO2 SERPL-SCNC: 27.7 MMOL/L (ref 22–29)
CREAT SERPL-MCNC: 0.6 MG/DL (ref 0.57–1)
EGFRCR SERPLBLD CKD-EPI 2021: 96.1 ML/MIN/1.73
EOSINOPHIL # BLD AUTO: 0.11 10*3/MM3 (ref 0–0.4)
EOSINOPHIL NFR BLD AUTO: 2.1 % (ref 0.3–6.2)
ERYTHROCYTE [DISTWIDTH] IN BLOOD BY AUTOMATED COUNT: 13.8 % (ref 12.3–15.4)
GLOBULIN SER CALC-MCNC: 2.5 GM/DL
GLUCOSE SERPL-MCNC: 121 MG/DL (ref 65–99)
HBA1C MFR BLD: 6.1 % (ref 4.8–5.6)
HCT VFR BLD AUTO: 38.5 % (ref 34–46.6)
HDLC SERPL-MCNC: 63 MG/DL (ref 40–60)
HGB BLD-MCNC: 12.1 G/DL (ref 12–15.9)
IMM GRANULOCYTES # BLD AUTO: 0.01 10*3/MM3 (ref 0–0.05)
IMM GRANULOCYTES NFR BLD AUTO: 0.2 % (ref 0–0.5)
LDLC SERPL CALC-MCNC: 47 MG/DL (ref 0–100)
LYMPHOCYTES # BLD AUTO: 1.77 10*3/MM3 (ref 0.7–3.1)
LYMPHOCYTES NFR BLD AUTO: 33.6 % (ref 19.6–45.3)
MCH RBC QN AUTO: 27 PG (ref 26.6–33)
MCHC RBC AUTO-ENTMCNC: 31.4 G/DL (ref 31.5–35.7)
MCV RBC AUTO: 85.9 FL (ref 79–97)
MONOCYTES # BLD AUTO: 0.44 10*3/MM3 (ref 0.1–0.9)
MONOCYTES NFR BLD AUTO: 8.3 % (ref 5–12)
NEUTROPHILS # BLD AUTO: 2.9 10*3/MM3 (ref 1.7–7)
NEUTROPHILS NFR BLD AUTO: 55 % (ref 42.7–76)
NRBC BLD AUTO-RTO: 0 /100 WBC (ref 0–0.2)
PLATELET # BLD AUTO: 292 10*3/MM3 (ref 140–450)
POTASSIUM SERPL-SCNC: 4.2 MMOL/L (ref 3.5–5.2)
PROT SERPL-MCNC: 6.7 G/DL (ref 6–8.5)
RBC # BLD AUTO: 4.48 10*6/MM3 (ref 3.77–5.28)
SODIUM SERPL-SCNC: 139 MMOL/L (ref 136–145)
TRIGL SERPL-MCNC: 57 MG/DL (ref 0–150)
VLDLC SERPL CALC-MCNC: 13 MG/DL (ref 5–40)
WBC # BLD AUTO: 5.27 10*3/MM3 (ref 3.4–10.8)

## 2022-11-15 PROCEDURE — 99214 OFFICE O/P EST MOD 30 MIN: CPT

## 2022-11-15 PROCEDURE — 90662 IIV NO PRSV INCREASED AG IM: CPT

## 2022-11-15 PROCEDURE — G0008 ADMIN INFLUENZA VIRUS VAC: HCPCS

## 2022-11-15 RX ORDER — ORAL SEMAGLUTIDE 3 MG/1
3 TABLET ORAL DAILY
Qty: 30 TABLET | Refills: 0 | Status: SHIPPED | OUTPATIENT
Start: 2022-11-15 | End: 2022-12-14 | Stop reason: SDUPTHER

## 2022-11-15 NOTE — PROGRESS NOTES
"    I N T E R N A L  M E D I C I N E  Janie DouglasSHADY    ENCOUNTER DATE:  11/15/2022    Kimidontrell Dominguez / 72 y.o. / female      CHIEF COMPLAINT / REASON FOR OFFICE VISIT     Diabetes, Hyperlipidemia, and Hypertension      ASSESSMENT & PLAN     Diagnoses and all orders for this visit:    1. Primary hypertension (Primary)  Overview:  Continue lisinopril 30 mg and carvedilol 3.125 mg BID.     Orders:  -     CBC & Differential  -     Comprehensive Metabolic Panel    2. Type 2 diabetes mellitus with microalbuminuria, without long-term current use of insulin (HCC)  Overview:  Complications: +microalbuminuria (5/2018)    Continue Ozempic 0.5 mg, metformin  mg 2 BID, and pioglitazone 15 mg.     Orders:  -     Hemoglobin A1c  -     Semaglutide (Rybelsus) 3 MG tablet; Take 1 tablet by mouth Daily for 30 days.  Dispense: 30 tablet; Refill: 0    3. Mixed hyperlipidemia  Overview:  Continue simvastatin 40 mg daily.     Orders:  -     Lipid Panel With / Chol / HDL Ratio    4. Encounter for immunization  -     Fluzone High-Dose 65+yrs ([id number])       SUMMARY/DISCUSSION  • BP elevated at today's appointment.  Discussed high importance of ensuring blood pressure is well controlled <130/80 in order to prevent cardiovascular complications.  Pt is asymptomatic at today's appointment.  Pt is agreeable to closely monitor at home and send BP results via [employer] in the next few days for review.  Discussed pt may need BP medications adjusted and she acknowledged understanding.  She understands that if she has any associated symptoms of elevated BP, she will need to visit ER.  • Pt agreeable to switch from Ozempic to Rybelsus.  New prescription sent.  Discussed potential side effects.        Next Appointment with me: Visit date not found    Return in about 4 months (around 3/15/2023) for Chronic care.      VITAL SIGNS     Visit Vitals  /82 (Cuff Size: Adult)   Pulse 82   Temp 97.3 °F (36.3 °C) (Temporal)   Ht 154.9 cm (60.98\") "   Wt 76.4 kg (168 lb 6.4 oz)   SpO2 98%   BMI 31.84 kg/m²             Wt Readings from Last 3 Encounters:   11/15/22 76.4 kg (168 lb 6.4 oz)   07/15/22 73.5 kg (162 lb)   02/15/22 74.8 kg (165 lb)     Body mass index is 31.84 kg/m².        MEDICATIONS AT THE TIME OF OFFICE VISIT     Current Outpatient Medications on File Prior to Visit   Medication Sig Dispense Refill   • carvedilol (COREG) 3.125 MG tablet TAKE ONE TABLET BY MOUTH TWICE A DAY WITH MEALS 180 tablet 3   • cyancobalamin (VITAMIN B-12) 100 MCG tablet Take  by mouth daily. As directed     • GLUCOSAMINE-CHONDROITIN PO Take 1 tablet by mouth. HOLD PRIOR TO SURGERY     • lisinopril (PRINIVIL,ZESTRIL) 30 MG tablet TAKE ONE TABLET BY MOUTH DAILY 90 tablet 3   • Melatonin 10 MG tablet Take 10 mg by mouth Every Evening.     • metFORMIN ER (GLUCOPHAGE-XR) 500 MG 24 hr tablet TAKE TWO TABLETS BY MOUTH TWICE A  tablet 3   • Multiple Vitamins-Minerals (MULTIVITAMIN ADULT PO) Take 1 tablet by mouth Daily. HOLD PRIOR TO SURGERY     • Multiple Vitamins-Minerals (OCUVITE ADULT 50+) capsule Take 1 capsule by mouth Daily. HOLD PRIOR TO SURGERY     • Omega-3 Fatty Acids (FISH OIL) 1000 MG capsule capsule Take 3 capsules by mouth Daily. HOLD PRIOR TO SURGERY     • pioglitazone (ACTOS) 15 MG tablet TAKE ONE TABLET BY MOUTH EVERY MORNING 90 tablet 3   • simvastatin (ZOCOR) 40 MG tablet TAKE ONE TABLET BY MOUTH ONCE NIGHTLY 90 tablet 0   • [DISCONTINUED] Ozempic, 0.25 or 0.5 MG/DOSE, 2 MG/1.5ML solution pen-injector Inject 0.5 mg under the skin into the appropriate area as directed 1 (One) Time Per Week. 4.5 mL 3     No current facility-administered medications on file prior to visit.        HISTORY OF PRESENT ILLNESS     HTN: Remains on Carvedilol 3.125 mg BID, Lisinopril 30 mg daily.  BP elevated today, 172/82.  Does not check at home.  Denies all associated s/s, including headache, vision changes, chest pain, dyspnea, palpitations, lower extremity swelling.  Recheck  at end of appointment was 160/80.       Type 2 diabetes: July 2022 A1C of 6.4.  Remains on Metformin 1000 mg BID, aactos 15 mg daily.  Fasting blood sugars averaging 120s.  Has not had Ozempic since approximately 3 weeks ago due to national back order.       HLD: July 2022 LDL of 58.  Remains on simvastatin 40 mg daily.        Patient Care Team:  Nura Leal MD as PCP - General (Internal Medicine)  Jess Vo MD as Consulting Physician (Dermatology)  Latha Holloway APRN as Nurse Practitioner (Obstetrics and Gynecology)    REVIEW OF SYSTEMS     Review of Systems   Constitutional: Negative for chills, fever and unexpected weight change.   Respiratory: Negative for cough, chest tightness and shortness of breath.    Cardiovascular: Negative for chest pain, palpitations and leg swelling.   Neurological: Negative for dizziness, weakness, light-headedness and headaches.   Psychiatric/Behavioral: The patient is not nervous/anxious.           PHYSICAL EXAMINATION     Physical Exam  Vitals reviewed.   Constitutional:       General: She is not in acute distress.     Appearance: Normal appearance. She is not ill-appearing, toxic-appearing or diaphoretic.   HENT:      Head: Normocephalic and atraumatic.   Cardiovascular:      Rate and Rhythm: Normal rate and regular rhythm.      Heart sounds: Normal heart sounds.   Pulmonary:      Effort: Pulmonary effort is normal.      Breath sounds: Normal breath sounds.   Musculoskeletal:      Right lower leg: No edema.      Left lower leg: No edema.   Neurological:      Mental Status: She is alert and oriented to person, place, and time. Mental status is at baseline.   Psychiatric:         Mood and Affect: Mood normal.         Behavior: Behavior normal.         Thought Content: Thought content normal.         Judgment: Judgment normal.           REVIEWED DATA     Labs:           Imaging:            Medical Tests:           Summary of old records / correspondence /  consultant report:           Request outside records:

## 2022-11-18 ENCOUNTER — TELEPHONE (OUTPATIENT)
Dept: INTERNAL MEDICINE | Age: 72
End: 2022-11-18

## 2022-11-18 DIAGNOSIS — I10 PRIMARY HYPERTENSION: Primary | Chronic | ICD-10-CM

## 2022-11-18 RX ORDER — LISINOPRIL 40 MG/1
40 TABLET ORAL DAILY
Qty: 30 TABLET | Refills: 0 | Status: SHIPPED | OUTPATIENT
Start: 2022-11-18 | End: 2022-12-20 | Stop reason: SDUPTHER

## 2022-11-18 NOTE — TELEPHONE ENCOUNTER
Caller: Kiel Bess    Relationship: Self    Best call back number: [phone]    What is the best time to reach you: ANY TIME    Who are you requesting to speak with (clinical staff, provider,  specific staff member): LIVE SR    Do you know the name of the person who called: KIEL BESS    What was the call regarding: BLOOD PRESSURE    Do you require a callback: YES

## 2022-11-18 NOTE — TELEPHONE ENCOUNTER
Please call pt.    Blood pressure readings have been elevated at home.    Recommend increasing dose of lisinopril to 40 mg daily. New prescription sent to pharmacy.  Please provide an update on your blood pressure readings early next week.  Goal is <130/80.       After increasing dose of medication, recommend scheduling lab only appointment to recheck kidney function in 2 weeks.

## 2022-11-29 DIAGNOSIS — I10 PRIMARY HYPERTENSION: Primary | Chronic | ICD-10-CM

## 2022-11-30 DIAGNOSIS — I10 PRIMARY HYPERTENSION: Chronic | ICD-10-CM

## 2022-11-30 DIAGNOSIS — E78.2 MIXED HYPERLIPIDEMIA: Primary | Chronic | ICD-10-CM

## 2022-11-30 RX ORDER — ATORVASTATIN CALCIUM 20 MG/1
20 TABLET, FILM COATED ORAL DAILY
Qty: 90 TABLET | Refills: 0 | Status: SHIPPED | OUTPATIENT
Start: 2022-11-30 | End: 2023-02-21

## 2022-11-30 RX ORDER — AMLODIPINE BESYLATE 5 MG/1
5 TABLET ORAL DAILY
Qty: 30 TABLET | Refills: 0 | Status: SHIPPED | OUTPATIENT
Start: 2022-11-30 | End: 2022-12-14 | Stop reason: DRUGHIGH

## 2022-12-01 DIAGNOSIS — E78.2 MIXED HYPERLIPIDEMIA: Chronic | ICD-10-CM

## 2022-12-14 DIAGNOSIS — I10 ESSENTIAL HYPERTENSION: Chronic | ICD-10-CM

## 2022-12-14 DIAGNOSIS — E11.29 TYPE 2 DIABETES MELLITUS WITH MICROALBUMINURIA, WITHOUT LONG-TERM CURRENT USE OF INSULIN: Chronic | ICD-10-CM

## 2022-12-14 DIAGNOSIS — R80.9 TYPE 2 DIABETES MELLITUS WITH MICROALBUMINURIA, WITHOUT LONG-TERM CURRENT USE OF INSULIN: Chronic | ICD-10-CM

## 2022-12-14 DIAGNOSIS — I10 PRIMARY HYPERTENSION: Primary | Chronic | ICD-10-CM

## 2022-12-14 DIAGNOSIS — I10 PRIMARY HYPERTENSION: Chronic | ICD-10-CM

## 2022-12-14 RX ORDER — AMLODIPINE BESYLATE 10 MG/1
10 TABLET ORAL DAILY
Qty: 90 TABLET | Refills: 0 | Status: SHIPPED | OUTPATIENT
Start: 2022-12-14 | End: 2022-12-14 | Stop reason: SDUPTHER

## 2022-12-14 RX ORDER — ORAL SEMAGLUTIDE 3 MG/1
3 TABLET ORAL DAILY
Qty: 90 TABLET | Refills: 3 | Status: SHIPPED | OUTPATIENT
Start: 2022-12-14 | End: 2023-03-15 | Stop reason: DRUGHIGH

## 2022-12-14 RX ORDER — CARVEDILOL 3.12 MG/1
TABLET ORAL
Qty: 180 TABLET | Refills: 3 | Status: SHIPPED | OUTPATIENT
Start: 2022-12-14

## 2022-12-14 RX ORDER — AMLODIPINE BESYLATE 10 MG/1
10 TABLET ORAL DAILY
Qty: 90 TABLET | Refills: 3 | Status: SHIPPED | OUTPATIENT
Start: 2022-12-14 | End: 2023-03-14

## 2022-12-20 DIAGNOSIS — I10 PRIMARY HYPERTENSION: Chronic | ICD-10-CM

## 2022-12-20 RX ORDER — LISINOPRIL 40 MG/1
40 TABLET ORAL DAILY
Qty: 90 TABLET | Refills: 3 | Status: SHIPPED | OUTPATIENT
Start: 2022-12-20

## 2022-12-27 DIAGNOSIS — E11.9 TYPE 2 DIABETES MELLITUS WITHOUT COMPLICATION, WITHOUT LONG-TERM CURRENT USE OF INSULIN: Chronic | ICD-10-CM

## 2022-12-27 RX ORDER — METFORMIN HYDROCHLORIDE 500 MG/1
TABLET, EXTENDED RELEASE ORAL
Qty: 360 TABLET | Refills: 0 | Status: SHIPPED | OUTPATIENT
Start: 2022-12-27

## 2023-02-20 DIAGNOSIS — E78.2 MIXED HYPERLIPIDEMIA: Chronic | ICD-10-CM

## 2023-02-21 RX ORDER — ATORVASTATIN CALCIUM 20 MG/1
TABLET, FILM COATED ORAL
Qty: 90 TABLET | Refills: 3 | Status: SHIPPED | OUTPATIENT
Start: 2023-02-21

## 2023-03-15 ENCOUNTER — OFFICE VISIT (OUTPATIENT)
Dept: INTERNAL MEDICINE | Age: 73
End: 2023-03-15
Payer: MEDICARE

## 2023-03-15 VITALS
HEIGHT: 61 IN | TEMPERATURE: 97.1 F | OXYGEN SATURATION: 99 % | BODY MASS INDEX: 32.51 KG/M2 | HEART RATE: 74 BPM | DIASTOLIC BLOOD PRESSURE: 72 MMHG | SYSTOLIC BLOOD PRESSURE: 138 MMHG | WEIGHT: 172.2 LBS

## 2023-03-15 DIAGNOSIS — E78.2 MIXED HYPERLIPIDEMIA: ICD-10-CM

## 2023-03-15 DIAGNOSIS — E11.9 TYPE 2 DIABETES MELLITUS WITHOUT COMPLICATION, WITHOUT LONG-TERM CURRENT USE OF INSULIN: ICD-10-CM

## 2023-03-15 DIAGNOSIS — M25.562 ACUTE PAIN OF LEFT KNEE: ICD-10-CM

## 2023-03-15 DIAGNOSIS — I10 PRIMARY HYPERTENSION: Primary | Chronic | ICD-10-CM

## 2023-03-15 LAB
ALBUMIN SERPL-MCNC: 4.4 G/DL (ref 3.5–5.2)
ALBUMIN/GLOB SERPL: 1.9 G/DL
ALP SERPL-CCNC: 76 U/L (ref 39–117)
ALT SERPL-CCNC: 22 U/L (ref 1–33)
AST SERPL-CCNC: 15 U/L (ref 1–32)
BILIRUB SERPL-MCNC: 0.3 MG/DL (ref 0–1.2)
BUN SERPL-MCNC: 17 MG/DL (ref 8–23)
BUN/CREAT SERPL: 24.6 (ref 7–25)
CALCIUM SERPL-MCNC: 10.4 MG/DL (ref 8.6–10.5)
CHLORIDE SERPL-SCNC: 102 MMOL/L (ref 98–107)
CHOLEST SERPL-MCNC: 117 MG/DL (ref 0–200)
CHOLEST/HDLC SERPL: 1.98 {RATIO}
CO2 SERPL-SCNC: 29.7 MMOL/L (ref 22–29)
CREAT SERPL-MCNC: 0.69 MG/DL (ref 0.57–1)
EGFRCR SERPLBLD CKD-EPI 2021: 92.9 ML/MIN/1.73
GLOBULIN SER CALC-MCNC: 2.3 GM/DL
GLUCOSE SERPL-MCNC: 136 MG/DL (ref 65–99)
HBA1C MFR BLD: 6.7 % (ref 4.8–5.6)
HDLC SERPL-MCNC: 59 MG/DL (ref 40–60)
LDLC SERPL CALC-MCNC: 45 MG/DL (ref 0–100)
POTASSIUM SERPL-SCNC: 4.1 MMOL/L (ref 3.5–5.2)
PROT SERPL-MCNC: 6.7 G/DL (ref 6–8.5)
SODIUM SERPL-SCNC: 139 MMOL/L (ref 136–145)
TRIGL SERPL-MCNC: 62 MG/DL (ref 0–150)
VLDLC SERPL CALC-MCNC: 13 MG/DL (ref 5–40)

## 2023-03-15 PROCEDURE — 3075F SYST BP GE 130 - 139MM HG: CPT

## 2023-03-15 PROCEDURE — 99214 OFFICE O/P EST MOD 30 MIN: CPT

## 2023-03-15 PROCEDURE — 3078F DIAST BP <80 MM HG: CPT

## 2023-03-15 RX ORDER — ORAL SEMAGLUTIDE 7 MG/1
7 TABLET ORAL DAILY
Qty: 90 TABLET | Refills: 0 | Status: SHIPPED | OUTPATIENT
Start: 2023-03-15

## 2023-03-15 NOTE — PROGRESS NOTES
"    I N T E R N A L  M E D I C I N E  Janie Douglas, APRN    ENCOUNTER DATE:  03/15/2023    Kimi Dominguez / 72 y.o. / female      CHIEF COMPLAINT / REASON FOR OFFICE VISIT     Hypertension, Diabetes, and Joint Swelling (L knee edema \"long time\")      ASSESSMENT & PLAN     Diagnoses and all orders for this visit:    1. Primary hypertension (Primary)  Overview:  Continue lisinopril 40 mg, amlodipine 10 mg, carvedilol 3.125 mg BID.       2. Type 2 diabetes mellitus without complication, without long-term current use of insulin (HCC)  -     Hemoglobin A1c  -     Semaglutide (Rybelsus) 7 MG tablet; Take 7 mg by mouth Daily.  Dispense: 90 tablet; Refill: 0    3. Mixed hyperlipidemia  Overview:  Continue atorvastatin 20 mg daily.    Orders:  -     Comprehensive Metabolic Panel  -     Lipid Panel With / Chol / HDL Ratio    4. Acute pain of left knee  -     XR Knee 3 View Left  -     Ambulatory Referral to Orthopedic Surgery       SUMMARY/DISCUSSION  • Pt was instructed on importance of checking BP at home and sending BP readings for review in 1 week.  Goal BP is <130/80.  • Agreeable to update labs.  • Will update knee XR and refer to ortho for ongoing left knee swelling and discomfort.  Apply ice PRN, brace.        Next Appointment with me: Visit date not found    Return for 4 months for AWV (after July 15, 2023).      VITAL SIGNS     Visit Vitals  /72 (Cuff Size: Adult)   Pulse 74   Temp 97.1 °F (36.2 °C) (Temporal)   Ht 154.9 cm (60.98\")   Wt 78.1 kg (172 lb 3.2 oz)   SpO2 99%   BMI 32.56 kg/m²             Wt Readings from Last 3 Encounters:   03/15/23 78.1 kg (172 lb 3.2 oz)   11/15/22 76.4 kg (168 lb 6.4 oz)   07/15/22 73.5 kg (162 lb)     Body mass index is 32.56 kg/m².        MEDICATIONS AT THE TIME OF OFFICE VISIT     Current Outpatient Medications on File Prior to Visit   Medication Sig Dispense Refill   • atorvastatin (LIPITOR) 20 MG tablet TAKE ONE TABLET BY MOUTH DAILY 90 tablet 3   • carvedilol (COREG) 3.125 " MG tablet TAKE ONE TABLET BY MOUTH TWICE A DAY WITH MEALS 180 tablet 3   • cyancobalamin (VITAMIN B-12) 100 MCG tablet Take  by mouth daily. As directed     • GLUCOSAMINE-CHONDROITIN PO Take 1 tablet by mouth. HOLD PRIOR TO SURGERY     • lisinopril (PRINIVIL,ZESTRIL) 40 MG tablet Take 1 tablet by mouth Daily. 90 tablet 3   • Melatonin 10 MG tablet Take 1 tablet by mouth Every Evening.     • metFORMIN ER (GLUCOPHAGE-XR) 500 MG 24 hr tablet TAKE TWO TABLETS BY MOUTH TWICE A  tablet 0   • Multiple Vitamins-Minerals (MULTIVITAMIN ADULT PO) Take 1 tablet by mouth Daily. HOLD PRIOR TO SURGERY     • Multiple Vitamins-Minerals (OCUVITE ADULT 50+) capsule Take 1 capsule by mouth Daily. HOLD PRIOR TO SURGERY     • Omega-3 Fatty Acids (FISH OIL) 1000 MG capsule capsule Take 3 capsules by mouth Daily. HOLD PRIOR TO SURGERY     • pioglitazone (ACTOS) 15 MG tablet TAKE ONE TABLET BY MOUTH EVERY MORNING 90 tablet 3   • [] amLODIPine (NORVASC) 10 MG tablet Take 1 tablet by mouth Daily for 90 days. 90 tablet 3   • [DISCONTINUED] Semaglutide (Rybelsus) 3 MG tablet Take 1 tablet by mouth Daily for 90 days. 90 tablet 3     No current facility-administered medications on file prior to visit.        HISTORY OF PRESENT ILLNESS     HTN: Remains on Carvedilol 3.125 mg BID, Lisinopril 40 mg daily, amlodipine 10 mg daily.  BP elevated today, 138/72.  She is not currently checking at home.  Denies all associated s/s, including headache, vision changes, chest pain, dyspnea, palpitations, lower extremity swelling.      Type 2 diabetes: 2022 A1C of 6.1.  Remains on Metformin 1000 mg BID, Actos 15 mg daily, Rybelsus 3 mg daily.  She would like to increase Rybelsus dosing.  She is tolerating well without side effects.  No episodes of hypoglycemia.    HLD: 2022 Lipid panel with triglycerides 57; LDL 47.  Remains on atorvastatin 20 mg daily.      She reports ongoing history of left knee discomfort and sensation of  "\"feeling like it could go out at anytime.\"  Reports anterior swelling x 6 months.  No trauma or injury, erythema, warmth.  She reports she hears grinding when bending.  She had a left knee arthroscopy in 2005.      Patient Care Team:  Nura Leal MD as PCP - General (Internal Medicine)  Jess Vo MD as Consulting Physician (Dermatology)  Latha Holloway APRN as Nurse Practitioner (Obstetrics and Gynecology)    REVIEW OF SYSTEMS     Review of Systems   Constitutional: Negative for chills, fever and unexpected weight change.   Respiratory: Negative for cough, chest tightness and shortness of breath.    Cardiovascular: Negative for chest pain, palpitations and leg swelling.   Endocrine: Negative for polydipsia, polyphagia and polyuria.   Musculoskeletal: Positive for arthralgias (Left knee).   Neurological: Negative for dizziness, weakness, light-headedness and headaches.   Psychiatric/Behavioral: The patient is not nervous/anxious.           PHYSICAL EXAMINATION     Physical Exam  Vitals reviewed.   Constitutional:       General: She is not in acute distress.     Appearance: Normal appearance. She is not ill-appearing, toxic-appearing or diaphoretic.   HENT:      Head: Normocephalic and atraumatic.   Cardiovascular:      Rate and Rhythm: Normal rate and regular rhythm.      Heart sounds: Normal heart sounds.   Pulmonary:      Effort: Pulmonary effort is normal.      Breath sounds: Normal breath sounds.   Musculoskeletal:      Left knee: Swelling present. No bony tenderness. Decreased range of motion. No tenderness.   Skin:     General: Skin is warm and dry.      Findings: No erythema.   Neurological:      Mental Status: She is alert and oriented to person, place, and time. Mental status is at baseline.   Psychiatric:         Mood and Affect: Mood normal.         Behavior: Behavior normal.         Thought Content: Thought content normal.         Judgment: Judgment normal.           REVIEWED DATA "     Labs:           Imaging:            Medical Tests:           Summary of old records / correspondence / consultant report:           Request outside records:

## 2023-03-16 ENCOUNTER — HOSPITAL ENCOUNTER (OUTPATIENT)
Dept: GENERAL RADIOLOGY | Facility: HOSPITAL | Age: 73
Discharge: HOME | End: 2023-03-16
Payer: MEDICARE

## 2023-03-16 PROCEDURE — 73562 X-RAY EXAM OF KNEE 3: CPT

## 2023-03-17 ENCOUNTER — OFFICE VISIT (OUTPATIENT)
Dept: SPORTS MEDICINE | Facility: CLINIC | Age: 73
End: 2023-03-17
Payer: MEDICARE

## 2023-03-17 VITALS
DIASTOLIC BLOOD PRESSURE: 70 MMHG | WEIGHT: 170 LBS | BODY MASS INDEX: 32.1 KG/M2 | SYSTOLIC BLOOD PRESSURE: 118 MMHG | HEART RATE: 73 BPM | HEIGHT: 61 IN | TEMPERATURE: 98.6 F | RESPIRATION RATE: 16 BRPM | OXYGEN SATURATION: 97 %

## 2023-03-17 DIAGNOSIS — M17.12 PRIMARY OSTEOARTHRITIS OF LEFT KNEE: Primary | ICD-10-CM

## 2023-03-17 PROCEDURE — 99214 OFFICE O/P EST MOD 30 MIN: CPT | Performed by: FAMILY MEDICINE

## 2023-03-17 PROCEDURE — 1160F RVW MEDS BY RX/DR IN RCRD: CPT | Performed by: FAMILY MEDICINE

## 2023-03-17 PROCEDURE — 20610 DRAIN/INJ JOINT/BURSA W/O US: CPT | Performed by: FAMILY MEDICINE

## 2023-03-17 PROCEDURE — 3074F SYST BP LT 130 MM HG: CPT | Performed by: FAMILY MEDICINE

## 2023-03-17 PROCEDURE — 3078F DIAST BP <80 MM HG: CPT | Performed by: FAMILY MEDICINE

## 2023-03-17 PROCEDURE — 1159F MED LIST DOCD IN RCRD: CPT | Performed by: FAMILY MEDICINE

## 2023-03-17 RX ORDER — TRIAMCINOLONE ACETONIDE 40 MG/ML
40 INJECTION, SUSPENSION INTRA-ARTICULAR; INTRAMUSCULAR
Status: DISCONTINUED | OUTPATIENT
Start: 2023-03-17 | End: 2023-03-17 | Stop reason: HOSPADM

## 2023-03-17 RX ADMIN — TRIAMCINOLONE ACETONIDE 40 MG: 40 INJECTION, SUSPENSION INTRA-ARTICULAR; INTRAMUSCULAR at 11:28

## 2023-03-17 NOTE — PROGRESS NOTES
"Kimi is a 72 y.o. year old female presents to [facility]    Chief Complaint   Patient presents with   • Left Knee - Pain       History of Present Illness  Chronic. NKI. H/o knee arthroscopy ?meniscus 2005. Pain lyla going up/down stairs. Instability with turning. Pain at night. Hears it \"grinding\" when bending over. No previous injections. Aleve PRN - helps.     I have reviewed the patient's medical, family, and social history in detail and updated the computerized patient record.    /70 (BP Location: Right arm, Patient Position: Sitting, Cuff Size: Large Adult)   Pulse 73   Temp 98.6 °F (37 °C) (Temporal)   Resp 16   Ht 154.9 cm (60.98\")   Wt 77.1 kg (170 lb)   SpO2 97%   BMI 32.14 kg/m²      Physical Exam    Mask worn thru encounter  Vital signs reviewed.   General: No acute distress.  Eyes: conjunctiva clear; pupils equally round and reactive  ENT: external ears atraumatic  CV: no peripheral edema  Resp: normal respiratory effort, no use of accessory muscles  Skin: no rashes or wounds; normal turgor  Psych: mood and affect appropriate; recent and remote memory intact  Neuro: sensation to light touch intact    MSK Exam  L knee: no effusion. Full ROM. + retropatellar crepitus.  Negative medial, negative lateral Alejandra.  No varus or valgus laxity.  There is bony hypertrophy noted along the medial compartment.    XR Knee 3 View Left (03/16/2023 10:08)  Severe tricompartmental osteoarthritis with bone-on-bone phenomenon within the medial and patellofemoral spaces.        Large Joint Arthrocentesis: L knee  Date/Time: 3/17/2023 11:28 AM  Consent given by: patient  Timeout: Immediately prior to procedure a time out was called to verify the correct patient, procedure, equipment, support staff and site/side marked as required   Supporting Documentation  Indications: pain   Procedure Details  Location: knee - L knee  Needle size: 25 G  Approach: anterolateral  Medications " administered: 40 mg triamcinolone acetonide 40 MG/ML  Patient tolerance: patient tolerated the procedure well with no immediate complications          Diagnoses and all orders for this visit:    Primary osteoarthritis of left knee  -     Visco Treatment; Future  -     Large Joint Arthrocentesis: L knee      Discussed diagnosis of primary osteoarthritis left knee.  Discussed management, treatment options.  Based on x-ray findings, patient would likely be a surgical candidate however she would like to avoid this at all possible.  Cortisone injection was done today for acute pain relief.  Discussed utility of viscosupplementation.  She currently is taking glucosamine, chondroitin.      Follow Up   No follow-ups on file.  Patient was given instructions and counseling regarding her condition or for health maintenance advice. Please see specific information pulled into the AVS if appropriate.     EMR Dragon/Transcription disclaimer:    Much of this encounter note is an electronic transcription/translation of spoken language to printed text.  The electronic translation of spoken language may permit erroneous, or at times, nonsensical words or phrases to be inadvertently transcribed.  Although I have reviewed the note for such errors some may still exist.

## 2023-03-24 ENCOUNTER — PATIENT ROUNDING (BHMG ONLY) (OUTPATIENT)
Dept: SPORTS MEDICINE | Facility: CLINIC | Age: 73
End: 2023-03-24
Payer: MEDICARE

## 2023-03-24 NOTE — PROGRESS NOTES
March 24, 2023    A [employer] Message has been sent to the patient for PATIENT ROUNDING with [facility]

## 2023-04-11 DIAGNOSIS — E11.9 TYPE 2 DIABETES MELLITUS WITHOUT COMPLICATION, WITHOUT LONG-TERM CURRENT USE OF INSULIN: Chronic | ICD-10-CM

## 2023-04-11 RX ORDER — METFORMIN HYDROCHLORIDE 500 MG/1
1000 TABLET, EXTENDED RELEASE ORAL 2 TIMES DAILY
Qty: 360 TABLET | Refills: 0 | Status: SHIPPED | OUTPATIENT
Start: 2023-04-11

## 2023-06-02 DIAGNOSIS — E11.9 TYPE 2 DIABETES MELLITUS WITHOUT COMPLICATION, WITHOUT LONG-TERM CURRENT USE OF INSULIN: ICD-10-CM

## 2023-06-02 RX ORDER — ORAL SEMAGLUTIDE 7 MG/1
TABLET ORAL
Qty: 90 TABLET | Refills: 0 | Status: SHIPPED | OUTPATIENT
Start: 2023-06-02

## 2023-06-15 ENCOUNTER — PROCEDURE VISIT (OUTPATIENT)
Dept: SPORTS MEDICINE | Facility: CLINIC | Age: 73
End: 2023-06-15
Payer: MEDICARE

## 2023-06-15 VITALS
HEIGHT: 61 IN | HEART RATE: 81 BPM | BODY MASS INDEX: 32.1 KG/M2 | SYSTOLIC BLOOD PRESSURE: 128 MMHG | DIASTOLIC BLOOD PRESSURE: 70 MMHG | WEIGHT: 170 LBS | OXYGEN SATURATION: 99 % | RESPIRATION RATE: 16 BRPM

## 2023-06-15 DIAGNOSIS — M17.12 PRIMARY OSTEOARTHRITIS OF LEFT KNEE: Primary | ICD-10-CM

## 2023-06-15 RX ORDER — AMLODIPINE BESYLATE 10 MG/1
10 TABLET ORAL DAILY
COMMUNITY
Start: 2023-06-05

## 2023-06-15 NOTE — PROGRESS NOTES
- Large Joint Arthrocentesis: L knee on 6/15/2023 10:17 AM  Indications: pain  Details: 22 G needle, ultrasound-guided superolateral approach  Medications: 88 mg Hyaluronan 88 MG/4ML  Outcome: tolerated well, no immediate complications  Procedure, treatment alternatives, risks and benefits explained, specific risks discussed. Consent was given by the patient. Immediately prior to procedure a time out was called to verify the correct patient, procedure, equipment, support staff and site/side marked as required. Patient was prepped and draped in the usual sterile fashion.

## 2023-08-08 DIAGNOSIS — E11.9 TYPE 2 DIABETES MELLITUS WITHOUT COMPLICATION, WITHOUT LONG-TERM CURRENT USE OF INSULIN: Chronic | ICD-10-CM

## 2023-08-08 RX ORDER — METFORMIN HYDROCHLORIDE 500 MG/1
1000 TABLET, EXTENDED RELEASE ORAL 2 TIMES DAILY
Qty: 360 TABLET | Refills: 1 | Status: SHIPPED | OUTPATIENT
Start: 2023-08-08

## 2023-08-19 DIAGNOSIS — E11.29 TYPE 2 DIABETES MELLITUS WITH MICROALBUMINURIA, WITHOUT LONG-TERM CURRENT USE OF INSULIN: Chronic | ICD-10-CM

## 2023-08-19 DIAGNOSIS — R80.9 TYPE 2 DIABETES MELLITUS WITH MICROALBUMINURIA, WITHOUT LONG-TERM CURRENT USE OF INSULIN: Chronic | ICD-10-CM

## 2023-08-21 RX ORDER — PIOGLITAZONEHYDROCHLORIDE 15 MG/1
TABLET ORAL
Qty: 90 TABLET | Refills: 1 | Status: SHIPPED | OUTPATIENT
Start: 2023-08-21

## 2023-09-05 DIAGNOSIS — Z12.31 ENCOUNTER FOR SCREENING MAMMOGRAM FOR MALIGNANT NEOPLASM OF BREAST: Primary | ICD-10-CM

## 2023-09-21 DIAGNOSIS — E11.9 TYPE 2 DIABETES MELLITUS WITHOUT COMPLICATION, WITHOUT LONG-TERM CURRENT USE OF INSULIN: ICD-10-CM

## 2023-09-21 RX ORDER — ORAL SEMAGLUTIDE 7 MG/1
1 TABLET ORAL DAILY
Qty: 90 TABLET | Refills: 0 | Status: SHIPPED | OUTPATIENT
Start: 2023-09-21

## 2023-10-04 ENCOUNTER — HOSPITAL ENCOUNTER (OUTPATIENT)
Dept: MAMMOGRAPHY | Facility: HOSPITAL | Age: 73
Discharge: HOME | End: 2023-10-04
Payer: MEDICARE

## 2023-10-04 DIAGNOSIS — Z12.31 ENCOUNTER FOR SCREENING MAMMOGRAM FOR MALIGNANT NEOPLASM OF BREAST: ICD-10-CM

## 2023-10-04 PROCEDURE — 77063 BREAST TOMOSYNTHESIS BI: CPT

## 2023-10-04 PROCEDURE — 77067 SCR MAMMO BI INCL CAD: CPT

## 2023-11-15 ENCOUNTER — TELEPHONE (OUTPATIENT)
Dept: SPORTS MEDICINE | Facility: CLINIC | Age: 73
End: 2023-11-15

## 2023-11-15 NOTE — TELEPHONE ENCOUNTER
Caller: Kimi Dominguez    Relationship to patient: Self    Best call back number: [phone]    Chief complaint: LEFT KNEE    Type of visit: CORTISONE INJECTION    Requested date: ASAP, MORNINGS

## 2023-11-16 ENCOUNTER — OFFICE VISIT (OUTPATIENT)
Dept: SPORTS MEDICINE | Facility: CLINIC | Age: 73
End: 2023-11-16
Payer: MEDICARE

## 2023-11-16 VITALS
WEIGHT: 173 LBS | OXYGEN SATURATION: 99 % | DIASTOLIC BLOOD PRESSURE: 60 MMHG | HEIGHT: 61 IN | BODY MASS INDEX: 32.66 KG/M2 | SYSTOLIC BLOOD PRESSURE: 122 MMHG | RESPIRATION RATE: 16 BRPM | HEART RATE: 97 BPM

## 2023-11-16 DIAGNOSIS — E11.29 TYPE 2 DIABETES MELLITUS WITH MICROALBUMINURIA, WITHOUT LONG-TERM CURRENT USE OF INSULIN: Chronic | ICD-10-CM

## 2023-11-16 DIAGNOSIS — R80.9 TYPE 2 DIABETES MELLITUS WITH MICROALBUMINURIA, WITHOUT LONG-TERM CURRENT USE OF INSULIN: Chronic | ICD-10-CM

## 2023-11-16 DIAGNOSIS — E66.9 OBESITY (BMI 30-39.9): Chronic | ICD-10-CM

## 2023-11-16 DIAGNOSIS — M17.12 PRIMARY OSTEOARTHRITIS OF LEFT KNEE: Primary | ICD-10-CM

## 2023-11-16 RX ORDER — TRIAMCINOLONE ACETONIDE 40 MG/ML
40 INJECTION, SUSPENSION INTRA-ARTICULAR; INTRAMUSCULAR
Status: DISCONTINUED | OUTPATIENT
Start: 2023-11-16 | End: 2023-11-16 | Stop reason: HOSPADM

## 2023-11-16 RX ADMIN — TRIAMCINOLONE ACETONIDE 40 MG: 40 INJECTION, SUSPENSION INTRA-ARTICULAR; INTRAMUSCULAR at 11:07

## 2023-11-16 NOTE — PROGRESS NOTES
"Kimi is a 73 y.o. year old female presents to [facility]    Chief Complaint   Patient presents with    Left Knee - Follow-up, Pain     F/u eval for LT knee pain with OA - here for consideration of repeat steroid injection under US        History of Present Illness  Follow-up left knee OA, acute pain.  States that gel series provided her with no relief.  She was patiently waiting for response to though never really received 1.  She is considering knee surgery next year though in the interim, would like pain relief.  She does have pain going up and down stairs, pain that disrupts sleep.  Has not resorted take anything over-the-counter.    Procedure visit with Aaron Thornton Jr., DO (06/15/2023)     I have reviewed the patient's medical, family, and social history in detail and updated the computerized patient record.    /60 (BP Location: Right arm, Patient Position: Sitting, Cuff Size: Adult)   Pulse 97   Resp 16   Ht 154.9 cm (60.98\")   Wt 78.5 kg (173 lb)   SpO2 99%   BMI 32.71 kg/m²      Physical Exam    Vital signs reviewed.   General: No acute distress.  Eyes: conjunctiva clear; pupils equally round and reactive  ENT: external ears atraumatic  CV: no peripheral edema  Resp: normal respiratory effort, no use of accessory muscles  Skin: no rashes or wounds; normal turgor  Psych: mood and affect appropriate; recent and remote memory intact  Neuro: sensation to light touch intact    MSK Exam  L knee: No effusion.  Full range of motion.  No varus no valgus laxity.    XR Knee 3 View Left (03/16/2023 10:08)   Reviewed independently.  Moderate to severe tricompartmental OA    Most Recent A1C          7/18/2023    08:42   HGBA1C Most Recent   Hemoglobin A1C 6.60            Large Joint Arthrocentesis: L knee  Date/Time: 11/16/2023 11:07 AM  Consent given by: patient  Timeout: Immediately prior to procedure a time out was called to verify the correct patient, procedure, " equipment, support staff and site/side marked as required   Supporting Documentation  Indications: pain   Procedure Details  Location: knee - L knee  Needle size: 25 G  Approach: anterolateral  Medications administered: 40 mg triamcinolone acetonide 40 MG/ML  Patient tolerance: patient tolerated the procedure well with no immediate complications          Diagnoses and all orders for this visit:    Primary osteoarthritis of left knee  -     Large Joint Arthrocentesis    Obesity (BMI 30-39.9)    Type 2 diabetes mellitus with microalbuminuria, without long-term current use of insulin      Acute exacerbation of knee OA.  Discussed continued nonsurgical management versus surgical consultation.  Cortisone injection as documented above.  Gel series has been ineffective in past.      Follow Up   No follow-ups on file.  Patient was given instructions and counseling regarding her condition or for health maintenance advice. Please see specific information pulled into the AVS if appropriate.     EMR Dragon/Transcription disclaimer:    Much of this encounter note is an electronic transcription/translation of spoken language to printed text.  The electronic translation of spoken language may permit erroneous, or at times, nonsensical words or phrases to be inadvertently transcribed.  Although I have reviewed the note for such errors some may still exist.

## 2023-12-04 DIAGNOSIS — I10 PRIMARY HYPERTENSION: Chronic | ICD-10-CM

## 2023-12-04 DIAGNOSIS — I10 ESSENTIAL HYPERTENSION: Chronic | ICD-10-CM

## 2023-12-04 RX ORDER — LISINOPRIL 40 MG/1
40 TABLET ORAL DAILY
Qty: 90 TABLET | Refills: 1 | Status: SHIPPED | OUTPATIENT
Start: 2023-12-04

## 2023-12-04 RX ORDER — CARVEDILOL 3.12 MG/1
TABLET ORAL
Qty: 180 TABLET | Refills: 1 | Status: SHIPPED | OUTPATIENT
Start: 2023-12-04

## 2023-12-15 DIAGNOSIS — E11.9 TYPE 2 DIABETES MELLITUS WITHOUT COMPLICATION, WITHOUT LONG-TERM CURRENT USE OF INSULIN: ICD-10-CM

## 2023-12-15 RX ORDER — ORAL SEMAGLUTIDE 7 MG/1
1 TABLET ORAL DAILY
Qty: 90 TABLET | Refills: 0 | Status: SHIPPED | OUTPATIENT
Start: 2023-12-15

## 2024-01-22 ENCOUNTER — OFFICE VISIT (OUTPATIENT)
Dept: INTERNAL MEDICINE | Age: 74
End: 2024-01-22
Payer: MEDICARE

## 2024-01-22 VITALS
HEART RATE: 86 BPM | BODY MASS INDEX: 31.91 KG/M2 | DIASTOLIC BLOOD PRESSURE: 80 MMHG | SYSTOLIC BLOOD PRESSURE: 130 MMHG | TEMPERATURE: 97.6 F | OXYGEN SATURATION: 98 % | HEIGHT: 61 IN | WEIGHT: 169 LBS

## 2024-01-22 DIAGNOSIS — E11.29 TYPE 2 DIABETES MELLITUS WITH MICROALBUMINURIA, WITHOUT LONG-TERM CURRENT USE OF INSULIN: Chronic | ICD-10-CM

## 2024-01-22 DIAGNOSIS — R80.9 TYPE 2 DIABETES MELLITUS WITH MICROALBUMINURIA, WITHOUT LONG-TERM CURRENT USE OF INSULIN: Chronic | ICD-10-CM

## 2024-01-22 DIAGNOSIS — E78.2 MIXED HYPERLIPIDEMIA: ICD-10-CM

## 2024-01-22 DIAGNOSIS — I10 PRIMARY HYPERTENSION: Primary | Chronic | ICD-10-CM

## 2024-01-22 LAB
ALBUMIN SERPL-MCNC: 4.3 G/DL (ref 3.5–5.2)
ALBUMIN/GLOB SERPL: 1.7 G/DL
ALP SERPL-CCNC: 87 U/L (ref 39–117)
ALT SERPL-CCNC: 15 U/L (ref 1–33)
AST SERPL-CCNC: 17 U/L (ref 1–32)
BILIRUB SERPL-MCNC: 0.4 MG/DL (ref 0–1.2)
BUN SERPL-MCNC: 13 MG/DL (ref 8–23)
BUN/CREAT SERPL: 21 (ref 7–25)
CALCIUM SERPL-MCNC: 10 MG/DL (ref 8.6–10.5)
CHLORIDE SERPL-SCNC: 103 MMOL/L (ref 98–107)
CHOLEST SERPL-MCNC: 124 MG/DL (ref 0–200)
CHOLEST/HDLC SERPL: 2.25 {RATIO}
CO2 SERPL-SCNC: 28.1 MMOL/L (ref 22–29)
CREAT SERPL-MCNC: 0.62 MG/DL (ref 0.57–1)
EGFRCR SERPLBLD CKD-EPI 2021: 94.8 ML/MIN/1.73
GLOBULIN SER CALC-MCNC: 2.5 GM/DL
GLUCOSE SERPL-MCNC: 92 MG/DL (ref 65–99)
HBA1C MFR BLD: 6.6 % (ref 4.8–5.6)
HDLC SERPL-MCNC: 55 MG/DL (ref 40–60)
LDLC SERPL CALC-MCNC: 53 MG/DL (ref 0–100)
POTASSIUM SERPL-SCNC: 4.3 MMOL/L (ref 3.5–5.2)
PROT SERPL-MCNC: 6.8 G/DL (ref 6–8.5)
SODIUM SERPL-SCNC: 142 MMOL/L (ref 136–145)
TRIGL SERPL-MCNC: 81 MG/DL (ref 0–150)
VLDLC SERPL CALC-MCNC: 16 MG/DL (ref 5–40)

## 2024-01-22 PROCEDURE — 1160F RVW MEDS BY RX/DR IN RCRD: CPT

## 2024-01-22 PROCEDURE — 99214 OFFICE O/P EST MOD 30 MIN: CPT

## 2024-01-22 PROCEDURE — 3079F DIAST BP 80-89 MM HG: CPT

## 2024-01-22 PROCEDURE — 3075F SYST BP GE 130 - 139MM HG: CPT

## 2024-01-22 PROCEDURE — 1159F MED LIST DOCD IN RCRD: CPT

## 2024-01-22 NOTE — PROGRESS NOTES
"    I N T E R N A L  M E D I C I N E  Janie Douglas, APRN    ENCOUNTER DATE:  01/22/2024    Kimi A Alberto / 73 y.o. / female      CHIEF COMPLAINT / REASON FOR OFFICE VISIT     Diabetes, Hyperlipidemia, and Hypertension      ASSESSMENT & PLAN     Diagnoses and all orders for this visit:    1. Primary hypertension (Primary)  Overview:  Continue lisinopril 40 mg, amlodipine 10 mg, carvedilol 3.125 mg BID.     Orders:  -     Comprehensive Metabolic Panel    2. Type 2 diabetes mellitus with microalbuminuria, without long-term current use of insulin  Overview:  Complications: +microalbuminuria (5/2018)    Continue Ozempic 0.5 mg, metformin  mg 2 BID, and pioglitazone 15 mg.     Orders:  -     Comprehensive Metabolic Panel  -     Hemoglobin A1c    3. Mixed hyperlipidemia  Overview:  Continue atorvastatin 20 mg daily.    Orders:  -     Lipid Panel With / Chol / HDL Ratio         SUMMARY/DISCUSSION  Encouraged to update flu and COVID-19 vaccines.      Next Appointment with me: Visit date not found    Return in about 6 months (around 7/19/2024) for Medicare Wellness.      VITAL SIGNS     Visit Vitals  /80   Pulse 86   Temp 97.6 °F (36.4 °C)   Ht 154.9 cm (60.98\")   Wt 76.7 kg (169 lb)   SpO2 98%   BMI 31.95 kg/m²             Wt Readings from Last 3 Encounters:   01/22/24 76.7 kg (169 lb)   11/16/23 78.5 kg (173 lb)   07/18/23 78.5 kg (173 lb)     Body mass index is 31.95 kg/m².        MEDICATIONS AT THE TIME OF OFFICE VISIT     Current Outpatient Medications on File Prior to Visit   Medication Sig Dispense Refill    amLODIPine (NORVASC) 10 MG tablet Take 1 tablet by mouth Daily.      atorvastatin (LIPITOR) 20 MG tablet TAKE ONE TABLET BY MOUTH DAILY 90 tablet 3    carvedilol (COREG) 3.125 MG tablet TAKE ONE TABLET BY MOUTH TWICE A DAY WITH MEALS 180 tablet 1    cyancobalamin (VITAMIN B-12) 100 MCG tablet Take  by mouth daily. As directed      GLUCOSAMINE-CHONDROITIN PO Take 1 tablet by mouth. HOLD PRIOR TO SURGERY   "    lisinopril (PRINIVIL,ZESTRIL) 40 MG tablet TAKE ONE TABLET BY MOUTH DAILY 90 tablet 1    Melatonin 10 MG tablet Take 1 tablet by mouth Every Evening.      metFORMIN ER (GLUCOPHAGE-XR) 500 MG 24 hr tablet Take 2 tablets by mouth 2 (Two) Times a Day. 360 tablet 1    Multiple Vitamins-Minerals (MULTIVITAMIN ADULT PO) Take 1 tablet by mouth Daily. HOLD PRIOR TO SURGERY      Multiple Vitamins-Minerals (OCUVITE ADULT 50+) capsule Take 1 capsule by mouth Daily. HOLD PRIOR TO SURGERY      Omega-3 Fatty Acids (FISH OIL) 1000 MG capsule capsule Take 3 capsules by mouth Daily. HOLD PRIOR TO SURGERY      pioglitazone (ACTOS) 15 MG tablet TAKE ONE TABLET BY MOUTH EVERY MORNING 90 tablet 1    Rybelsus 7 MG tablet TAKE 1 TABLET BY MOUTH DAILY 90 tablet 0     No current facility-administered medications on file prior to visit.        HISTORY OF PRESENT ILLNESS     No problems or concerns at today's visit.     HTN: Remains well controlled on carvedilol 3.125 mg BID, lisinopril 40 mg daily, amlodipine 10 mg daily.  BP at today's visit, 130/80  She checks at home occasionally and it averages 120s/70s.  Denies headache, vision changes, chest pain, dyspnea, palpitations, lower extremity swelling.       Type 2 diabetes: July 2023 Hemoglobin A1C of 6.6.  Remains on metformin 1000 mg BID, Actos 15 mg daily, Rybelsus 7 mg daily.   FBS average 90-120s.  No episodes of hypoglycemia.  Down 4 pounds since November 2023.       HLD: Remains well controlled on atorvastatin 20 mg daily and fish oil supplementation.  July 2023 Lipid panel with triglycerides 57; LDL 50.       October 2023 Mammogram: No mammographic evidence of malignancy. Recommend annual screening mammogram in one year.  Scheduled for DEXA in March 2024.  Colonoscopy next due October 2028.      Patient Care Team:  Janie Douglas APRN as PCP - General (Family Medicine)  Jess Vo MD as Consulting Physician (Dermatology)  Latha Holloway APRN as Nurse Practitioner  (Obstetrics and Gynecology)    REVIEW OF SYSTEMS     Review of Systems   Constitutional:  Negative for chills, fever and unexpected weight change.   Respiratory:  Negative for cough, chest tightness and shortness of breath.    Cardiovascular:  Negative for chest pain, palpitations and leg swelling.   Neurological:  Negative for dizziness, weakness, light-headedness and headaches.   Psychiatric/Behavioral:  The patient is not nervous/anxious.           PHYSICAL EXAMINATION     Physical Exam  Vitals reviewed.   Constitutional:       General: She is not in acute distress.     Appearance: Normal appearance. She is not ill-appearing, toxic-appearing or diaphoretic.   HENT:      Head: Normocephalic and atraumatic.   Cardiovascular:      Rate and Rhythm: Normal rate and regular rhythm.      Heart sounds: Normal heart sounds.   Pulmonary:      Effort: Pulmonary effort is normal.      Breath sounds: Normal breath sounds.   Musculoskeletal:      Right lower leg: No edema.      Left lower leg: No edema.   Neurological:      Mental Status: She is alert and oriented to person, place, and time. Mental status is at baseline.   Psychiatric:         Mood and Affect: Mood normal.         Behavior: Behavior normal.         Thought Content: Thought content normal.         Judgment: Judgment normal.           REVIEWED DATA     Labs:           Imaging:            Medical Tests:           Summary of old records / correspondence / consultant report:           Request outside records:

## 2024-02-06 DIAGNOSIS — E11.9 TYPE 2 DIABETES MELLITUS WITHOUT COMPLICATION, WITHOUT LONG-TERM CURRENT USE OF INSULIN: Chronic | ICD-10-CM

## 2024-02-07 RX ORDER — METFORMIN HYDROCHLORIDE 500 MG/1
1000 TABLET, EXTENDED RELEASE ORAL 2 TIMES DAILY
Qty: 360 TABLET | Refills: 1 | Status: SHIPPED | OUTPATIENT
Start: 2024-02-07

## 2024-02-12 DIAGNOSIS — E78.2 MIXED HYPERLIPIDEMIA: Chronic | ICD-10-CM

## 2024-02-13 RX ORDER — ATORVASTATIN CALCIUM 20 MG/1
20 TABLET, FILM COATED ORAL DAILY
Qty: 90 TABLET | Refills: 1 | Status: SHIPPED | OUTPATIENT
Start: 2024-02-13

## 2024-02-19 DIAGNOSIS — E11.9 TYPE 2 DIABETES MELLITUS WITHOUT COMPLICATION, WITHOUT LONG-TERM CURRENT USE OF INSULIN: ICD-10-CM

## 2024-02-19 RX ORDER — ORAL SEMAGLUTIDE 7 MG/1
1 TABLET ORAL DAILY
Qty: 90 TABLET | Refills: 0 | Status: CANCELLED | OUTPATIENT
Start: 2024-02-19

## 2024-02-21 DIAGNOSIS — E11.29 TYPE 2 DIABETES MELLITUS WITH MICROALBUMINURIA, WITHOUT LONG-TERM CURRENT USE OF INSULIN: Chronic | ICD-10-CM

## 2024-02-21 DIAGNOSIS — R80.9 TYPE 2 DIABETES MELLITUS WITH MICROALBUMINURIA, WITHOUT LONG-TERM CURRENT USE OF INSULIN: Chronic | ICD-10-CM

## 2024-02-22 RX ORDER — PIOGLITAZONEHYDROCHLORIDE 15 MG/1
15 TABLET ORAL EVERY MORNING
Qty: 90 TABLET | Refills: 0 | Status: SHIPPED | OUTPATIENT
Start: 2024-02-22

## 2024-03-01 DIAGNOSIS — E11.9 TYPE 2 DIABETES MELLITUS WITHOUT COMPLICATION, WITHOUT LONG-TERM CURRENT USE OF INSULIN: ICD-10-CM

## 2024-03-01 RX ORDER — ORAL SEMAGLUTIDE 7 MG/1
1 TABLET ORAL DAILY
Qty: 90 TABLET | Refills: 1 | Status: SHIPPED | OUTPATIENT
Start: 2024-03-01

## 2024-03-11 ENCOUNTER — HOSPITAL ENCOUNTER (OUTPATIENT)
Dept: BONE DENSITY | Facility: HOSPITAL | Age: 74
Discharge: HOME | End: 2024-03-11
Payer: MEDICARE

## 2024-03-11 DIAGNOSIS — Z78.0 POST-MENOPAUSAL: ICD-10-CM

## 2024-03-11 PROCEDURE — 77080 DXA BONE DENSITY AXIAL: CPT

## 2024-03-12 RX ORDER — AMLODIPINE BESYLATE 10 MG/1
10 TABLET ORAL DAILY
Qty: 90 TABLET | Refills: 1 | Status: SHIPPED | OUTPATIENT
Start: 2024-03-12

## 2024-04-17 ENCOUNTER — OFFICE VISIT (OUTPATIENT)
Dept: SPORTS MEDICINE | Facility: CLINIC | Age: 74
End: 2024-04-17
Payer: MEDICARE

## 2024-04-17 VITALS
BODY MASS INDEX: 31.15 KG/M2 | OXYGEN SATURATION: 98 % | RESPIRATION RATE: 16 BRPM | WEIGHT: 165 LBS | HEIGHT: 61 IN | SYSTOLIC BLOOD PRESSURE: 138 MMHG | HEART RATE: 87 BPM | DIASTOLIC BLOOD PRESSURE: 60 MMHG

## 2024-04-17 DIAGNOSIS — E11.29 TYPE 2 DIABETES MELLITUS WITH MICROALBUMINURIA, WITHOUT LONG-TERM CURRENT USE OF INSULIN: ICD-10-CM

## 2024-04-17 DIAGNOSIS — M25.562 ACUTE PAIN OF LEFT KNEE: ICD-10-CM

## 2024-04-17 DIAGNOSIS — M79.672 PAIN OF MIDFOOT, LEFT: ICD-10-CM

## 2024-04-17 DIAGNOSIS — R80.9 TYPE 2 DIABETES MELLITUS WITH MICROALBUMINURIA, WITHOUT LONG-TERM CURRENT USE OF INSULIN: ICD-10-CM

## 2024-04-17 DIAGNOSIS — E66.9 OBESITY (BMI 30-39.9): ICD-10-CM

## 2024-04-17 DIAGNOSIS — M17.12 PRIMARY OSTEOARTHRITIS OF LEFT KNEE: Primary | ICD-10-CM

## 2024-04-17 RX ORDER — TRIAMCINOLONE ACETONIDE 40 MG/ML
40 INJECTION, SUSPENSION INTRA-ARTICULAR; INTRAMUSCULAR
Status: DISCONTINUED | OUTPATIENT
Start: 2024-04-17 | End: 2024-04-17 | Stop reason: HOSPADM

## 2024-04-17 RX ADMIN — TRIAMCINOLONE ACETONIDE 40 MG: 40 INJECTION, SUSPENSION INTRA-ARTICULAR; INTRAMUSCULAR at 13:56

## 2024-04-17 NOTE — PROGRESS NOTES
"Kimi is a 73 y.o. year old female presents to [facility]    Chief Complaint   Patient presents with    Left Knee - Pain, Follow-up     F/u eval for LT knee pain with OA - reports pain has been worsened, stiffness, lock at times and unable to straighten leg, would like to talk about surgical options as well - here for consideration of repeat steroid injection for further evaluation and treatment        History of Present Illness  History of Present Illness  The patient presents for evaluation of multiple medical concerns.    The patient experienced severe knee pain on Monday, rendering her in tears. The pain, irrespective of movement or weight-bearing, was severe enough to render her unable to move or stand. This is her first encounter with such pain. She received a gel injection last year, which proved ineffective, and a cortisone injection in 03/2023 provided relief. However, in 06/2023 and 11/2023, she received a second cortisone injection, which also did not yield significant relief. She expresses fatigue when ascending and descending stairs. She reports no unusual swelling. Her pain management regimen includes Aleve, which she takes intermittently. The pain intensity fluctuates, and she suspects that weather conditions may be contributing to it.    The patient reports intermittent discomfort on the top of her left foot, which intensifies after wearing tennis shoes for a prolonged period. She is uncertain if this discomfort is related to her knee. She also experiences intermittent swelling, which fluctuates in intensity. She speculates that pressure from tight tennis shoes significantly exacerbates the pain.    I have reviewed the patient's medical, family, and social history in detail and updated the computerized patient record.    /60 (BP Location: Right arm, Patient Position: Sitting, Cuff Size: Adult)   Pulse 87   Resp 16   Ht 154.9 cm (60.98\")   Wt 74.8 kg (165 lb) "   SpO2 98%   BMI 31.19 kg/m²      Physical Exam    Vital signs reviewed.   General: No acute distress.  Eyes: conjunctiva clear; pupils equally round and reactive  ENT: external ears atraumatic  CV: no peripheral edema  Resp: normal respiratory effort, no use of accessory muscles  Skin: no rashes or wounds; normal turgor  Psych: mood and affect appropriate; recent and remote memory intact  Neuro: sensation to light touch intact    MSK Exam  Physical Exam  The left knee demonstrates no effusion, full range of motion, positive retropatellar crepitus, negative medial, negative lateral Alejandra. The left foot is grossly unremarkable.    XR Knee 3 View Left (03/16/2023 10:08)       Results      Large Joint Arthrocentesis: L knee  Date/Time: 4/17/2024 1:56 PM  Consent given by: patient  Timeout: Immediately prior to procedure a time out was called to verify the correct patient, procedure, equipment, support staff and site/side marked as required   Supporting Documentation  Indications: pain   Procedure Details  Location: knee - L knee  Needle size: 25 G  Approach: anterolateral  Medications administered: 40 mg triamcinolone acetonide 40 MG/ML  Patient tolerance: patient tolerated the procedure well with no immediate complications          Diagnoses and all orders for this visit:    Primary osteoarthritis of left knee    Acute pain of left knee    Type 2 diabetes mellitus with microalbuminuria, without long-term current use of insulin    Obesity (BMI 30-39.9)    Pain of midfoot, left    Other orders  -     Ibuprofen 3 %, Baclofen 2 %, lidocaine 4 %; Apply 1-2 g topically to the appropriate area as directed 3 (Three) to 4 (Four) times daily.      Assessment & Plan  1. Pain in the left knee.  The patient's clinical presentation suggests a probable stage 3 or 4 knee arthritis. A prescription for a compounded cream has been issued. Should the compounded cream prove ineffective, the next step will be to consult with a  surgeon.    2. Pain in the left foot.  The patient's symptoms may be indicative of arthritis in the foot. The patient has been advised to monitor the condition and to avoid wearing tennis shoes if it impedes her ability to do so.          Follow Up     Patient was given instructions and counseling regarding her condition or for health maintenance advice. Please see specific information pulled into the AVS if appropriate.     Patient or patient representative verbalized consent for the use of Ambient Listening during the visit with  JESSI Thornton Jr., DO for chart documentation. 4/17/2024  13:56 EDT

## 2024-05-20 DIAGNOSIS — R80.9 TYPE 2 DIABETES MELLITUS WITH MICROALBUMINURIA, WITHOUT LONG-TERM CURRENT USE OF INSULIN: Chronic | ICD-10-CM

## 2024-05-20 DIAGNOSIS — E11.29 TYPE 2 DIABETES MELLITUS WITH MICROALBUMINURIA, WITHOUT LONG-TERM CURRENT USE OF INSULIN: Chronic | ICD-10-CM

## 2024-05-21 RX ORDER — PIOGLITAZONEHYDROCHLORIDE 15 MG/1
15 TABLET ORAL EVERY MORNING
Qty: 90 TABLET | Refills: 1 | Status: SHIPPED | OUTPATIENT
Start: 2024-05-21

## 2024-06-20 DIAGNOSIS — I10 PRIMARY HYPERTENSION: Chronic | ICD-10-CM

## 2024-06-20 DIAGNOSIS — I10 ESSENTIAL HYPERTENSION: Chronic | ICD-10-CM

## 2024-06-20 RX ORDER — LISINOPRIL 40 MG/1
40 TABLET ORAL DAILY
Qty: 90 TABLET | Refills: 1 | Status: SHIPPED | OUTPATIENT
Start: 2024-06-20

## 2024-06-20 RX ORDER — CARVEDILOL 3.12 MG/1
3.12 TABLET ORAL 2 TIMES DAILY WITH MEALS
Qty: 180 TABLET | Refills: 1 | Status: SHIPPED | OUTPATIENT
Start: 2024-06-20

## 2024-07-21 NOTE — PROGRESS NOTES
I N T E R N A L  M E D I C I JOEL E    SHADY Mireles      ENCOUNTER DATE:  07/22/2024    Kimidontrell Dominguez / 74 y.o. / female      MEDICARE ANNUAL WELLNESS VISIT       Chief Complaint:    Chief Complaint   Patient presents with    Medicare Wellness-subsequent         Patient's general assessment of her health since a year ago:     - Compared to one year ago, she feels her physical health is:   STABLE/SAME    - Compared to one year ago, she feels her mental health is:  STABLE/SAME    Recent Hospitalization (within past 365 days) (NO unless indicated)  No      Patient Care Team:    Patient Care Team:  Janie Douglas APRN as PCP - General (Family Medicine)  Jess Vo MD as Consulting Physician (Dermatology)  Latha Holloway APRN as Nurse Practitioner (Obstetrics and Gynecology)    Allergies:  Patient has no known allergies.    Medications:  Current Outpatient Medications on File Prior to Visit   Medication Sig Dispense Refill    amLODIPine (NORVASC) 10 MG tablet TAKE ONE TABLET BY MOUTH DAILY 90 tablet 1    atorvastatin (LIPITOR) 20 MG tablet Take 1 tablet by mouth Daily. 90 tablet 1    carvedilol (COREG) 3.125 MG tablet Take 1 tablet by mouth 2 (Two) Times a Day With Meals. 180 tablet 1    cyancobalamin (VITAMIN B-12) 100 MCG tablet Take  by mouth daily. As directed      GLUCOSAMINE-CHONDROITIN PO Take 1 tablet by mouth. HOLD PRIOR TO SURGERY      Ibuprofen 3 %, Baclofen 2 %, lidocaine 4 % Apply 1-2 g topically to the appropriate area as directed 3 (Three) to 4 (Four) times daily. 240 g 3    lisinopril (PRINIVIL,ZESTRIL) 40 MG tablet Take 1 tablet by mouth Daily. 90 tablet 1    Melatonin 10 MG tablet Take 1 tablet by mouth Every Evening.      metFORMIN ER (GLUCOPHAGE-XR) 500 MG 24 hr tablet TAKE 2 TABLETS BY MOUTH TWICE A  tablet 1    Multiple Vitamins-Minerals (MULTIVITAMIN ADULT PO) Take 1 tablet by mouth Daily. HOLD PRIOR TO SURGERY      Multiple Vitamins-Minerals (OCUVITE ADULT 50+) capsule  Take 1 capsule by mouth Daily. HOLD PRIOR TO SURGERY      Omega-3 Fatty Acids (FISH OIL) 1000 MG capsule capsule Take 3 capsules by mouth Daily. HOLD PRIOR TO SURGERY      pioglitazone (ACTOS) 15 MG tablet TAKE 1 TABLET BY MOUTH EVERY MORNING 90 tablet 1    Semaglutide (Rybelsus) 7 MG tablet Take 7 mg by mouth Daily. 90 tablet 1     No current facility-administered medications on file prior to visit.          No opioid medication identified on active medication list. I have reviewed chart for other potential  high risk medication/s and harmful drug interactions in the elderly.       Opioid Pain Assessment: No opioid listed on medication list       HPI for other active medical problems:     No problems or concerns at today's visit.     HTN: Remains well controlled on carvedilol 3.125 mg BID, lisinopril 40 mg daily, amlodipine 10 mg daily.  BP at today's visit, 135/78.  She checks at home occasionally and it averages 120s/70s.  Denies headache, vision changes, chest pain, dyspnea, palpitations, lower extremity swelling.       Type 2 diabetes: January 2024 Hemoglobin A1C of 6.6.  Remains on metformin 1000 mg BID, Actos 15 mg daily, Rybelsus 7 mg daily.   FBS average 90-120s.  No episodes of hypoglycemia.  Weight is stable.  Up to date with diabetic eye exam.     HLD: Remains well controlled on atorvastatin 20 mg daily and fish oil supplementation.  January 2024 Lipid panel with triglycerides 81; LDL 53.       October 2023 Mammogram showed no evidence of malignancy.  She is agreeable to update this October 2024.    March 2024 DEXA showed normal bone density.    Colonoscopy next due October 2028.      HISTORY     PFSH:     The following portions of the patient's history were reviewed and updated as appropriate: Allergies / Current Medications / Past Medical History / Surgical History / Social History / Family History    Problem List:  Patient Active Problem List   Diagnosis    Hyperlipidemia    Hypertension    Obesity  (BMI 30-39.9)    Primary osteoarthritis involving multiple joints    Type 2 diabetes mellitus with renal complication    Cobalamin deficiency    Lymphocytic colitis    Mixed stress and urge urinary incontinence    Female stress incontinence    Acute meniscal tear of knee    Rectocele    Urinary incontinence       Past Medical History:  Past Medical History:   Diagnosis Date    Arthritis     B12 deficiency     Diabetes mellitus     Female stress incontinence     Hyperlipidemia     Hypertension     Loose stools     Obesity     Rectocele 05/02/2018    Shingles     left eye    Skin cancer     Urinary incontinence        Past Surgical History:  Past Surgical History:   Procedure Laterality Date    ADENOIDECTOMY      CATARACT EXTRACTION Bilateral     COLONOSCOPY  2008    COLONOSCOPY N/A 10/04/2018    Procedure: COLONOSCOPY to Cecum WITH BIOPSY;  Surgeon: Geovanna Rebollar MD;  Location: [facility] ENDOSCOPY;  Service: General    EAR RECONSTRUCTION Left     KNEE ARTHROSCOPY Left     MOHS SURGERY      PAP SMEAR      POSTERIOR VAGINAL REPAIR  05/02/2018    for rectocele    PUBOVAGINAL SLING N/A 09/21/2021    Procedure: pubovaginal sling cystourethroscopy;  Surgeon: Janneth Matamoros MD;  Location: [facility] MAIN OR;  Service: Gynecology;  Laterality: N/A;    SKIN CANCER DESTRUCTION      basal cells removed on nose bridge, left thigh squamous removed    TONSILLECTOMY      TUBAL ABDOMINAL LIGATION         Social History:  Social History     Socioeconomic History    Marital status:      Spouse name: Avtar Srinivasan)    Number of children: 3   Tobacco Use    Smoking status: Light Smoker     Types: Cigarettes    Smokeless tobacco: Never   Vaping Use    Vaping status: Never Used   Substance and Sexual Activity    Alcohol use: Yes     Comment: RARELY    Drug use: No    Sexual activity: Not Currently     Partners: Male     Birth control/protection: Tubal ligation, Birth control pill       Family History:  Family History   Problem  "Relation Age of Onset    Stroke Mother     Arthritis Mother     Coronary artery disease Father          82    Prostate cancer Father     Hypertension Father     Cancer Father     No Known Problems Sister     Diabetes Paternal Grandmother         Type 2    Breast cancer Maternal Aunt     Thyroid cancer Neg Hx     Colon cancer Neg Hx     Malig Hyperthermia Neg Hx          PATIENT ASSESSMENT     Vitals:  Vitals:    24 0726   BP: 135/78   Pulse: 84   Resp: 18   Temp: 97 °F (36.1 °C)   SpO2: 97%   Weight: 75.8 kg (167 lb)   Height: 154.9 cm (60.98\")       BP Readings from Last 3 Encounters:   24 135/78   24 138/60   24 130/80     Wt Readings from Last 3 Encounters:   24 75.8 kg (167 lb)   24 74.8 kg (165 lb)   24 76.7 kg (169 lb)      Body mass index is 31.57 kg/m².    [unfilled]        Review of Systems:    Review of Systems   Constitutional:  Negative for chills, fever and unexpected weight change.   Respiratory:  Negative for cough, chest tightness and shortness of breath.    Cardiovascular:  Negative for chest pain, palpitations and leg swelling.   Neurological:  Negative for dizziness, weakness, light-headedness and headaches.   Psychiatric/Behavioral:  The patient is not nervous/anxious.          Physical Exam:    Physical Exam  Vitals reviewed.   Constitutional:       General: She is not in acute distress.     Appearance: Normal appearance. She is not ill-appearing, toxic-appearing or diaphoretic.   HENT:      Head: Normocephalic and atraumatic.      Right Ear: Tympanic membrane, ear canal and external ear normal. There is no impacted cerumen.      Left Ear: Tympanic membrane, ear canal and external ear normal. There is no impacted cerumen.      Nose: Nose normal. No congestion or rhinorrhea.      Mouth/Throat:      Mouth: Mucous membranes are moist.      Pharynx: Oropharynx is clear. No oropharyngeal exudate or posterior oropharyngeal erythema.   Eyes:      Extraocular " Movements: Extraocular movements intact.      Conjunctiva/sclera: Conjunctivae normal.      Pupils: Pupils are equal, round, and reactive to light.   Cardiovascular:      Rate and Rhythm: Normal rate and regular rhythm.      Heart sounds: Normal heart sounds.   Pulmonary:      Effort: Pulmonary effort is normal. No respiratory distress.      Breath sounds: Normal breath sounds.   Abdominal:      General: Bowel sounds are normal.      Palpations: Abdomen is soft.      Tenderness: There is no abdominal tenderness.   Musculoskeletal:         General: Normal range of motion.      Cervical back: Normal range of motion and neck supple.      Right lower leg: No edema.      Left lower leg: No edema.   Feet:      Right foot:      Protective Sensation: 10 sites tested.  10 sites sensed.      Left foot:      Protective Sensation: 10 sites tested.  10 sites sensed.   Lymphadenopathy:      Cervical: No cervical adenopathy.   Skin:     General: Skin is warm and dry.   Neurological:      General: No focal deficit present.      Mental Status: She is alert and oriented to person, place, and time. Mental status is at baseline.   Psychiatric:         Mood and Affect: Mood normal.         Behavior: Behavior normal.         Thought Content: Thought content normal.         Judgment: Judgment normal.           Reviewed Data:    Labs:   Lab Results   Component Value Date     01/22/2024    K 4.3 01/22/2024    CALCIUM 10.0 01/22/2024    AST 17 01/22/2024    ALT 15 01/22/2024    BUN 13 01/22/2024    CREATININE 0.62 01/22/2024    CREATININE 0.57 07/18/2023    CREATININE 0.69 03/15/2023    EGFRIFNONA 114 09/17/2021    EGFRIFAFRI 116 04/06/2021       Lab Results   Component Value Date     (H) 04/27/2018    HGBA1C 6.60 (H) 01/22/2024    HGBA1C 6.60 (H) 07/18/2023    HGBA1C 6.70 (H) 03/15/2023    MICROALBUR 3.9 07/18/2023       Lab Results   Component Value Date    LDL 53 01/22/2024    LDL 50 07/18/2023    LDL 45 03/15/2023    HDL 55  "01/22/2024    TRIG 81 01/22/2024    CHOLHDLRATIO 2.25 01/22/2024       Lab Results   Component Value Date    TSH 1.530 07/18/2023    FREET4 1.50 07/18/2023          Lab Results   Component Value Date    WBC 6.18 07/18/2023    HGB 12.5 07/18/2023    HGB 12.1 11/15/2022    HGB 13.1 07/15/2022     07/18/2023                 No results found for: \"PSA\"    Imaging:                Medical Tests:              Summary of old records / correspondence / consultant report:             Request outside records:           SCREENING ASSESSMENT      Screening for Glaucoma:  Previous screening for glaucoma?: Yes    Hearing Loss Screen:  Finger Rub Hearing Test (right ear): Passed  Finger Rub Hearing Test (left ear): Passed    Urinary Incontinence Screen:  Episodes of urinary incontinence? :  Wears pads    Depression Screen:      7/22/2024     7:33 AM   PHQ-2/PHQ-9 Depression Screening   Little Interest or Pleasure in Doing Things 0-->not at all   Feeling Down, Depressed or Hopeless 0-->not at all   PHQ-9: Brief Depression Severity Measure Score 0        PHQ-2: 0 (Not depressed)    PHQ-9: 0 (Negative screening for depression)         FUNCTIONAL, FALL RISK, & COGNITIVE SCREENING (components below):     A) Functional and cognitive status based on patient responses:        7/22/2024     7:31 AM   Functional & Cognitive Status   Do you have difficulty preparing food and eating? No   Do you have difficulty bathing yourself, getting dressed or grooming yourself? No   Do you have difficulty using the toilet? No   Do you have difficulty moving around from place to place? No   Do you have trouble with steps or getting out of a bed or a chair? No   Current Diet Well Balanced Diet   Dental Exam Up to date   Eye Exam Up to date   Exercise (times per week) 0 times per week   Current Exercises Include No Regular Exercise   Do you need help using the phone?  No   Are you deaf or do you have serious difficulty hearing?  No   Do you need help " to go to places out of walking distance? No   Do you need help shopping? No   Do you need help preparing meals?  No   Do you need help with housework?  No   Do you need help with laundry? No   Do you need help taking your medications? No   Do you need help managing money? No   Do you ever drive or ride in a car without wearing a seat belt? No   Have you felt unusual stress, anger or loneliness in the last month? No   Who do you live with? Spouse   If you need help, do you have trouble finding someone available to you? No   Have you been bothered in the last four weeks by sexual problems? No   Do you have difficulty concentrating, remembering or making decisions? No       B) Assessment of Fall Risk:    Fall Risk Assessment was completed, and patient is at low risk for falls.    Need for further evaluation of gait, strength, and balance? : NO    Timed Up and Go (TUG): 10 seconds   (>= 12 seconds indicates high risk for falling)    Observable abnormalities included:   Normal balance and gait pattern    C. Assessment of Cognitive Function:    Mini-Cog Test:     1) Registration (3 objects): YES   2) Clock Draw: Passed? : Yes   3) Number of objects recalled: 3 (MA)     Further evaluation required? : No    **OVERALL ASSESSMENT OF FUNCTIONAL ABILITY**  (Assessment of ability to perform ADL's (showering/bathing, using toilet, dressing, feeding self, moving self around) and IADL's (use telephone, shop, prepare food, housekeep, do laundry, transport independently, take medications independently, and handle finances)    DEGREE OF FUNCTIONAL IMPAIRMENT:   NONE (based on assessment noted above)    ABILITY TO LIVE INDEPENDENTLY:    Capable of living independently       COUNSELING       A. Identification of Health Risk Factors:    Risk factors include: cardiovascular risk factors      B. Age-Appropriate Screening Schedule:  (Refer to the list below for future screening recommendations based on patient's age, sex and/or medical  conditions. Orders for these recommended tests are listed in the plan section. The patient has been provided with a written plan)    Health Maintenance Topics  Health Maintenance   Topic Date Due    COVID-19 Vaccine (1 - 2023-24 season) Never done    TDAP/TD VACCINES (2 - Td or Tdap) 06/04/2024    ANNUAL WELLNESS VISIT  07/18/2024    DIABETIC FOOT EXAM  07/18/2024    URINE MICROALBUMIN  07/18/2024    HEMOGLOBIN A1C  07/22/2024    INFLUENZA VACCINE  08/01/2024    DIABETIC EYE EXAM  12/15/2024    LIPID PANEL  01/22/2025    BMI FOLLOWUP  03/01/2025    MAMMOGRAM  10/04/2025    DXA SCAN  03/11/2026    COLORECTAL CANCER SCREENING  10/04/2028    HEPATITIS C SCREENING  Completed    Pneumococcal Vaccine 65+  Completed    ZOSTER VACCINE  Addressed    PAP SMEAR  Discontinued       Health Maintenance Topics Due or Over-Due  Health Maintenance Due   Topic Date Due    COVID-19 Vaccine (1 - 2023-24 season) Never done    TDAP/TD VACCINES (2 - Td or Tdap) 06/04/2024    ANNUAL WELLNESS VISIT  07/18/2024    DIABETIC FOOT EXAM  07/18/2024    URINE MICROALBUMIN  07/18/2024    HEMOGLOBIN A1C  07/22/2024         C. Advanced Care Planning:    Advance Care Planning   ACP discussion was held with the patient during this visit. Patient has an advance directive (not in EMR), copy requested.       D. Patient Self-Management and Personalized Health Advice:    She has been provided with PERSONALIZED COUNSELING/INFORMATION (AVS educational information) about:     -- reducing risk for cardiovascular disease (heart, stroke, vascular) and designing advance directives      She has been recommended for the following PREVENTATIVE SERVICES which has been performed today, will be ordered today or ordered/performed on upcoming follow-up visit:     LIFESTYLE PREVENTATIVE MEASURES   CARDIOVASCULAR disease risk reduction counseling performed, URINARY incontinence assessment done    CARDIOVASCULAR SCREENING   N/A    CANCER SCREENING   COLORECTAL cancer:  Colonoscopy/Cologuard discussed and plan to continue screening, BREAST CANCER screening discussed and mammogram every 1-2 years recommended (managed by me)    [facility] SCREENING  OSTEOPOROSIS screening DISCUSSED    VACCINATION/IMMUNIZATION   vaccination for INFLUENZA administered/recommended/discussed       E. Miscellaneous Items: [id number]    Aspirin use counseling: Does not need ASA (and currently is not on it)    Discussed BMI with her. The BMI is above average; BMI management plan is completed (discussed plans for weight loss)    Reviewed use of high risk medication in the elderly: YES    Reviewed for potential of harmful drug interactions in the elderly: YES        WRAP UP       Assessment & Plan:    1) MEDICARE ANNUAL WELLNESS VISIT    2) OTHER MEDICAL CONDITIONS ADDRESSED TODAY:            Problem List Items Addressed This Visit       Hyperlipidemia (Chronic)    Overview     Continue atorvastatin 20 mg daily.         Relevant Medications    Omega-3 Fatty Acids (FISH OIL) 1000 MG capsule capsule    atorvastatin (LIPITOR) 20 MG tablet    Hypertension (Chronic)    Overview     Continue lisinopril 40 mg, amlodipine 10 mg, carvedilol 3.125 mg BID.          Relevant Medications    amLODIPine (NORVASC) 10 MG tablet    carvedilol (COREG) 3.125 MG tablet    lisinopril (PRINIVIL,ZESTRIL) 40 MG tablet     Other Visit Diagnoses       Encounter for annual wellness visit (AWV) in Medicare patient    -  Primary    Type 2 diabetes mellitus with microalbuminuria, without long-term current use of insulin                        No orders of the defined types were placed in this encounter.      Discussion / Summary:    Continue to monitor BP at home to ensure it is averaging < 130/80.  Follow low sodium diet, increase exercise as tolerated.    For HLD, follow low fat diet.    For Type 2 Diabetes, continue low carb diet, regular exercise.  Monitor FBS for goal of between .    Update mammogram this Fall 2024.    Medications as  of TODAY:              Current Outpatient Medications   Medication Sig Dispense Refill    amLODIPine (NORVASC) 10 MG tablet TAKE ONE TABLET BY MOUTH DAILY 90 tablet 1    atorvastatin (LIPITOR) 20 MG tablet Take 1 tablet by mouth Daily. 90 tablet 1    carvedilol (COREG) 3.125 MG tablet Take 1 tablet by mouth 2 (Two) Times a Day With Meals. 180 tablet 1    cyancobalamin (VITAMIN B-12) 100 MCG tablet Take  by mouth daily. As directed      GLUCOSAMINE-CHONDROITIN PO Take 1 tablet by mouth. HOLD PRIOR TO SURGERY      Ibuprofen 3 %, Baclofen 2 %, lidocaine 4 % Apply 1-2 g topically to the appropriate area as directed 3 (Three) to 4 (Four) times daily. 240 g 3    lisinopril (PRINIVIL,ZESTRIL) 40 MG tablet Take 1 tablet by mouth Daily. 90 tablet 1    Melatonin 10 MG tablet Take 1 tablet by mouth Every Evening.      metFORMIN ER (GLUCOPHAGE-XR) 500 MG 24 hr tablet TAKE 2 TABLETS BY MOUTH TWICE A  tablet 1    Multiple Vitamins-Minerals (MULTIVITAMIN ADULT PO) Take 1 tablet by mouth Daily. HOLD PRIOR TO SURGERY      Multiple Vitamins-Minerals (OCUVITE ADULT 50+) capsule Take 1 capsule by mouth Daily. HOLD PRIOR TO SURGERY      Omega-3 Fatty Acids (FISH OIL) 1000 MG capsule capsule Take 3 capsules by mouth Daily. HOLD PRIOR TO SURGERY      pioglitazone (ACTOS) 15 MG tablet TAKE 1 TABLET BY MOUTH EVERY MORNING 90 tablet 1    Semaglutide (Rybelsus) 7 MG tablet Take 7 mg by mouth Daily. 90 tablet 1     No current facility-administered medications for this visit.         FOLLOW-UP:            No follow-ups on file.              No future appointments.          After Visit Summary (AVS) including the Personalized Prevention  Plan Services (PPPS) was either printed and given to the patient at check-out today and/or sent to [facility] for review.

## 2024-07-22 ENCOUNTER — OFFICE VISIT (OUTPATIENT)
Dept: INTERNAL MEDICINE | Age: 74
End: 2024-07-22
Payer: MEDICARE

## 2024-07-22 VITALS
DIASTOLIC BLOOD PRESSURE: 78 MMHG | TEMPERATURE: 97 F | RESPIRATION RATE: 18 BRPM | SYSTOLIC BLOOD PRESSURE: 135 MMHG | BODY MASS INDEX: 31.53 KG/M2 | WEIGHT: 167 LBS | HEART RATE: 84 BPM | OXYGEN SATURATION: 97 % | HEIGHT: 61 IN

## 2024-07-22 DIAGNOSIS — Z00.00 ENCOUNTER FOR ANNUAL WELLNESS VISIT (AWV) IN MEDICARE PATIENT: Primary | ICD-10-CM

## 2024-07-22 DIAGNOSIS — R80.9 TYPE 2 DIABETES MELLITUS WITH MICROALBUMINURIA, WITHOUT LONG-TERM CURRENT USE OF INSULIN: ICD-10-CM

## 2024-07-22 DIAGNOSIS — E78.2 MIXED HYPERLIPIDEMIA: ICD-10-CM

## 2024-07-22 DIAGNOSIS — E11.29 TYPE 2 DIABETES MELLITUS WITH MICROALBUMINURIA, WITHOUT LONG-TERM CURRENT USE OF INSULIN: ICD-10-CM

## 2024-07-22 DIAGNOSIS — I10 PRIMARY HYPERTENSION: ICD-10-CM

## 2024-07-22 DIAGNOSIS — Z12.31 ENCOUNTER FOR SCREENING MAMMOGRAM FOR MALIGNANT NEOPLASM OF BREAST: ICD-10-CM

## 2024-07-22 PROCEDURE — 99214 OFFICE O/P EST MOD 30 MIN: CPT

## 2024-07-22 PROCEDURE — 3078F DIAST BP <80 MM HG: CPT

## 2024-07-22 PROCEDURE — 1160F RVW MEDS BY RX/DR IN RCRD: CPT

## 2024-07-22 PROCEDURE — 3075F SYST BP GE 130 - 139MM HG: CPT

## 2024-07-22 PROCEDURE — 1125F AMNT PAIN NOTED PAIN PRSNT: CPT

## 2024-07-22 PROCEDURE — 1159F MED LIST DOCD IN RCRD: CPT

## 2024-07-22 PROCEDURE — 1170F FXNL STATUS ASSESSED: CPT

## 2024-07-22 PROCEDURE — 3044F HG A1C LEVEL LT 7.0%: CPT

## 2024-07-22 PROCEDURE — G0439 PPPS, SUBSEQ VISIT: HCPCS

## 2024-07-23 LAB
ALBUMIN SERPL-MCNC: 4.5 G/DL (ref 3.5–5.2)
ALBUMIN/CREAT UR: 11 MG/G CREAT (ref 0–29)
ALBUMIN/GLOB SERPL: 2 G/DL
ALP SERPL-CCNC: 72 U/L (ref 39–117)
ALT SERPL-CCNC: 20 U/L (ref 1–33)
APPEARANCE UR: CLEAR
AST SERPL-CCNC: 18 U/L (ref 1–32)
BACTERIA #/AREA URNS HPF: ABNORMAL /HPF
BASOPHILS # BLD AUTO: 0.06 10*3/MM3 (ref 0–0.2)
BASOPHILS NFR BLD AUTO: 0.8 % (ref 0–1.5)
BILIRUB SERPL-MCNC: 0.3 MG/DL (ref 0–1.2)
BILIRUB UR QL STRIP: NEGATIVE
BUN SERPL-MCNC: 13 MG/DL (ref 8–23)
BUN/CREAT SERPL: 21.3 (ref 7–25)
CALCIUM SERPL-MCNC: 10.2 MG/DL (ref 8.6–10.5)
CASTS URNS MICRO: ABNORMAL
CHLORIDE SERPL-SCNC: 101 MMOL/L (ref 98–107)
CHOLEST SERPL-MCNC: 121 MG/DL (ref 0–200)
CHOLEST/HDLC SERPL: 2.16 {RATIO}
CO2 SERPL-SCNC: 28.2 MMOL/L (ref 22–29)
COLOR UR: YELLOW
CREAT SERPL-MCNC: 0.61 MG/DL (ref 0.57–1)
CREAT UR-MCNC: 57.1 MG/DL
EGFRCR SERPLBLD CKD-EPI 2021: 94.5 ML/MIN/1.73
EOSINOPHIL # BLD AUTO: 0.08 10*3/MM3 (ref 0–0.4)
EOSINOPHIL NFR BLD AUTO: 1.1 % (ref 0.3–6.2)
EPI CELLS #/AREA URNS HPF: ABNORMAL /HPF
ERYTHROCYTE [DISTWIDTH] IN BLOOD BY AUTOMATED COUNT: 13.4 % (ref 12.3–15.4)
GLOBULIN SER CALC-MCNC: 2.2 GM/DL
GLUCOSE SERPL-MCNC: 106 MG/DL (ref 65–99)
GLUCOSE UR QL STRIP: NEGATIVE
HBA1C MFR BLD: 6.4 % (ref 4.8–5.6)
HCT VFR BLD AUTO: 38 % (ref 34–46.6)
HDLC SERPL-MCNC: 56 MG/DL (ref 40–60)
HGB BLD-MCNC: 12.5 G/DL (ref 12–15.9)
HGB UR QL STRIP: NEGATIVE
IMM GRANULOCYTES # BLD AUTO: 0.01 10*3/MM3 (ref 0–0.05)
IMM GRANULOCYTES NFR BLD AUTO: 0.1 % (ref 0–0.5)
KETONES UR QL STRIP: NEGATIVE
LDLC SERPL CALC-MCNC: 50 MG/DL (ref 0–100)
LEUKOCYTE ESTERASE UR QL STRIP: ABNORMAL
LYMPHOCYTES # BLD AUTO: 1.82 10*3/MM3 (ref 0.7–3.1)
LYMPHOCYTES NFR BLD AUTO: 24.8 % (ref 19.6–45.3)
MCH RBC QN AUTO: 28.1 PG (ref 26.6–33)
MCHC RBC AUTO-ENTMCNC: 32.9 G/DL (ref 31.5–35.7)
MCV RBC AUTO: 85.4 FL (ref 79–97)
MICROALBUMIN UR-MCNC: 6.4 UG/ML
MONOCYTES # BLD AUTO: 0.54 10*3/MM3 (ref 0.1–0.9)
MONOCYTES NFR BLD AUTO: 7.4 % (ref 5–12)
NEUTROPHILS # BLD AUTO: 4.82 10*3/MM3 (ref 1.7–7)
NEUTROPHILS NFR BLD AUTO: 65.8 % (ref 42.7–76)
NITRITE UR QL STRIP: NEGATIVE
NRBC BLD AUTO-RTO: 0 /100 WBC (ref 0–0.2)
PH UR STRIP: 6 [PH] (ref 5–8)
PLATELET # BLD AUTO: 321 10*3/MM3 (ref 140–450)
POTASSIUM SERPL-SCNC: 5 MMOL/L (ref 3.5–5.2)
PROT SERPL-MCNC: 6.7 G/DL (ref 6–8.5)
PROT UR QL STRIP: NEGATIVE
RBC # BLD AUTO: 4.45 10*6/MM3 (ref 3.77–5.28)
RBC #/AREA URNS HPF: ABNORMAL /HPF
SODIUM SERPL-SCNC: 140 MMOL/L (ref 136–145)
SP GR UR STRIP: 1.01 (ref 1–1.03)
T4 FREE SERPL-MCNC: 1.54 NG/DL (ref 0.92–1.68)
TRIGL SERPL-MCNC: 72 MG/DL (ref 0–150)
TSH SERPL DL<=0.005 MIU/L-ACNC: 2.25 UIU/ML (ref 0.27–4.2)
UROBILINOGEN UR STRIP-MCNC: ABNORMAL MG/DL
VLDLC SERPL CALC-MCNC: 15 MG/DL (ref 5–40)
WBC # BLD AUTO: 7.33 10*3/MM3 (ref 3.4–10.8)
WBC #/AREA URNS HPF: ABNORMAL /HPF

## 2024-08-04 DIAGNOSIS — E11.9 TYPE 2 DIABETES MELLITUS WITHOUT COMPLICATION, WITHOUT LONG-TERM CURRENT USE OF INSULIN: Chronic | ICD-10-CM

## 2024-08-06 RX ORDER — METFORMIN HYDROCHLORIDE 500 MG/1
1000 TABLET, EXTENDED RELEASE ORAL 2 TIMES DAILY
Qty: 360 TABLET | Refills: 1 | Status: SHIPPED | OUTPATIENT
Start: 2024-08-06

## 2024-08-09 DIAGNOSIS — E78.2 MIXED HYPERLIPIDEMIA: Chronic | ICD-10-CM

## 2024-08-12 RX ORDER — ATORVASTATIN CALCIUM 20 MG/1
20 TABLET, FILM COATED ORAL DAILY
Qty: 90 TABLET | Refills: 1 | Status: SHIPPED | OUTPATIENT
Start: 2024-08-12

## 2024-09-03 DIAGNOSIS — E11.9 TYPE 2 DIABETES MELLITUS WITHOUT COMPLICATION, WITHOUT LONG-TERM CURRENT USE OF INSULIN: ICD-10-CM

## 2024-09-05 RX ORDER — ORAL SEMAGLUTIDE 7 MG/1
1 TABLET ORAL DAILY
Qty: 90 TABLET | Refills: 0 | Status: SHIPPED | OUTPATIENT
Start: 2024-09-05

## 2024-09-05 RX ORDER — AMLODIPINE BESYLATE 10 MG/1
10 TABLET ORAL DAILY
Qty: 90 TABLET | Refills: 0 | Status: SHIPPED | OUTPATIENT
Start: 2024-09-05

## 2024-10-08 ENCOUNTER — HOSPITAL ENCOUNTER (OUTPATIENT)
Dept: MAMMOGRAPHY | Facility: HOSPITAL | Age: 74
Discharge: HOME | End: 2024-10-08
Payer: MEDICARE

## 2024-10-08 DIAGNOSIS — Z12.31 ENCOUNTER FOR SCREENING MAMMOGRAM FOR MALIGNANT NEOPLASM OF BREAST: ICD-10-CM

## 2024-10-08 PROCEDURE — 77067 SCR MAMMO BI INCL CAD: CPT

## 2024-10-08 PROCEDURE — 77063 BREAST TOMOSYNTHESIS BI: CPT

## 2024-11-21 DIAGNOSIS — E11.29 TYPE 2 DIABETES MELLITUS WITH MICROALBUMINURIA, WITHOUT LONG-TERM CURRENT USE OF INSULIN: Chronic | ICD-10-CM

## 2024-11-21 DIAGNOSIS — R80.9 TYPE 2 DIABETES MELLITUS WITH MICROALBUMINURIA, WITHOUT LONG-TERM CURRENT USE OF INSULIN: Chronic | ICD-10-CM

## 2024-11-22 RX ORDER — PIOGLITAZONEHYDROCHLORIDE 15 MG/1
15 TABLET ORAL EVERY MORNING
Qty: 90 TABLET | Refills: 1 | Status: SHIPPED | OUTPATIENT
Start: 2024-11-22

## 2024-12-03 DIAGNOSIS — I10 PRIMARY HYPERTENSION: Chronic | ICD-10-CM

## 2024-12-03 RX ORDER — AMLODIPINE BESYLATE 10 MG/1
10 TABLET ORAL DAILY
Qty: 90 TABLET | Refills: 0 | Status: SHIPPED | OUTPATIENT
Start: 2024-12-03

## 2024-12-03 RX ORDER — LISINOPRIL 40 MG/1
40 TABLET ORAL DAILY
Qty: 90 TABLET | Refills: 1 | Status: SHIPPED | OUTPATIENT
Start: 2024-12-03

## 2024-12-04 DIAGNOSIS — E11.9 TYPE 2 DIABETES MELLITUS WITHOUT COMPLICATION, WITHOUT LONG-TERM CURRENT USE OF INSULIN: ICD-10-CM

## 2024-12-04 RX ORDER — ORAL SEMAGLUTIDE 7 MG/1
1 TABLET ORAL DAILY
Qty: 90 TABLET | Refills: 0 | Status: SHIPPED | OUTPATIENT
Start: 2024-12-04

## 2024-12-10 RX ORDER — AMLODIPINE BESYLATE 10 MG/1
10 TABLET ORAL DAILY
Qty: 90 TABLET | Refills: 1 | Status: SHIPPED | OUTPATIENT
Start: 2024-12-10

## 2024-12-14 DIAGNOSIS — I10 ESSENTIAL HYPERTENSION: Chronic | ICD-10-CM

## 2024-12-15 RX ORDER — CARVEDILOL 3.12 MG/1
3.12 TABLET ORAL 2 TIMES DAILY WITH MEALS
Qty: 180 TABLET | Refills: 1 | Status: SHIPPED | OUTPATIENT
Start: 2024-12-15

## 2024-12-18 ENCOUNTER — OFFICE VISIT (OUTPATIENT)
Dept: SPORTS MEDICINE | Facility: CLINIC | Age: 74
End: 2024-12-18
Payer: MEDICARE

## 2024-12-18 VITALS
HEART RATE: 100 BPM | SYSTOLIC BLOOD PRESSURE: 130 MMHG | RESPIRATION RATE: 16 BRPM | DIASTOLIC BLOOD PRESSURE: 60 MMHG | WEIGHT: 167 LBS | BODY MASS INDEX: 31.53 KG/M2 | OXYGEN SATURATION: 99 % | HEIGHT: 61 IN

## 2024-12-18 DIAGNOSIS — G89.29 CHRONIC PAIN OF LEFT KNEE: ICD-10-CM

## 2024-12-18 DIAGNOSIS — M17.12 PRIMARY OSTEOARTHRITIS OF LEFT KNEE: Primary | ICD-10-CM

## 2024-12-18 DIAGNOSIS — M25.562 CHRONIC PAIN OF LEFT KNEE: ICD-10-CM

## 2024-12-18 PROCEDURE — 3075F SYST BP GE 130 - 139MM HG: CPT | Performed by: FAMILY MEDICINE

## 2024-12-18 PROCEDURE — 99213 OFFICE O/P EST LOW 20 MIN: CPT | Performed by: FAMILY MEDICINE

## 2024-12-18 PROCEDURE — 1160F RVW MEDS BY RX/DR IN RCRD: CPT | Performed by: FAMILY MEDICINE

## 2024-12-18 PROCEDURE — 1159F MED LIST DOCD IN RCRD: CPT | Performed by: FAMILY MEDICINE

## 2024-12-18 PROCEDURE — 3078F DIAST BP <80 MM HG: CPT | Performed by: FAMILY MEDICINE

## 2024-12-18 PROCEDURE — 20610 DRAIN/INJ JOINT/BURSA W/O US: CPT | Performed by: FAMILY MEDICINE

## 2024-12-18 RX ORDER — TRIAMCINOLONE ACETONIDE 40 MG/ML
40 INJECTION, SUSPENSION INTRA-ARTICULAR; INTRAMUSCULAR
Status: COMPLETED | OUTPATIENT
Start: 2024-12-18 | End: 2024-12-18

## 2024-12-18 RX ADMIN — TRIAMCINOLONE ACETONIDE 40 MG: 40 INJECTION, SUSPENSION INTRA-ARTICULAR; INTRAMUSCULAR at 14:25

## 2024-12-18 NOTE — PROGRESS NOTES
"Kimi is a 74 y.o. year old female presents to [facility]    Chief Complaint   Patient presents with    Left Knee - Follow-up, Pain     F/u eval for LT knee pain with OA - reports same OA sxs as before - here for further evaluation and treatment        History of Present Illness  History of Present Illness  The patient presents for evaluation of left knee pain.    She reports intermittent left knee pain, which she has been managing with injections every 3 months. The most recent injection provided significant relief. She has a planned vacation during the spring break and another at the end of 06/2025, and is considering scheduling an injection prior to these events. She also reports experiencing crepitus in her knee when bending down to retrieve objects. She is contemplating surgical intervention but is hesitant due to concerns about potential recovery time interfering with her travel plans.    I have reviewed the patient's medical, family, and social history in detail and updated the computerized patient record.    /60 (BP Location: Right arm, Patient Position: Sitting, Cuff Size: Large Adult)   Pulse 100   Resp 16   Ht 154.9 cm (60.98\")   Wt 75.8 kg (167 lb)   SpO2 99%   BMI 31.57 kg/m²      Physical Exam    Vital signs reviewed.   General: No acute distress.  Eyes: conjunctiva clear; pupils equally round and reactive  ENT: external ears atraumatic  CV: no peripheral edema  Resp: normal respiratory effort, no use of accessory muscles  Skin: no rashes or wounds; normal turgor  Psych: mood and affect appropriate; recent and remote memory intact  Neuro: sensation to light touch intact    MSK Exam  Physical Exam  In the left knee, there is positive retropatellar crepitus, no effusion, full range of motion, negative medial, and negative lateral Alejandra.          Results      Large Joint Arthrocentesis: L knee  Date/Time: 12/18/2024 2:25 PM  Consent given by: " patient  Timeout: Immediately prior to procedure a time out was called to verify the correct patient, procedure, equipment, support staff and site/side marked as required   Supporting Documentation  Indications: pain   Procedure Details  Location: knee - L knee  Needle size: 25 G  Approach: anterolateral  Medications administered: 40 mg triamcinolone acetonide 40 MG/ML  Patient tolerance: patient tolerated the procedure well with no immediate complications          Diagnoses and all orders for this visit:    Primary osteoarthritis of left knee  -     Large Joint Arthrocentesis    Chronic pain of left knee      Assessment & Plan  1. Left knee pain.  The patient reports that the last cortisone injection provided some relief, but the pain has been off and on. She experiences popping and cracking when bending down.  Has not had good response to gel series in the past.  Continue with cortisone injections on intermittent as needed basis but she is considering knee replacement surgery in the near future.    Follow-up  The patient will follow up in 3 months.          Follow Up     Patient was given instructions and counseling regarding her condition or for health maintenance advice. Please see specific information pulled into the AVS if appropriate.     Patient or patient representative verbalized consent for the use of Ambient Listening during the visit with  JESSI Thornton Jr.,  for chart documentation. 12/18/2024  14:25 EST

## 2025-01-16 NOTE — TELEPHONE ENCOUNTER
January 16, 2025       Dejan Arreola MD  [address]  [facility] [address]  Via Fax: [phone]      Patient: Zbigniew Littlejohn   YOB: 1959   Date of Visit: 1/16/2025       Dear Dr. Arreola:    I saw your patient, Zbigniew Littlejohn, for an evaluation. Below are my notes for this visit with him.    If you have questions, please do not hesitate to call me.      Sincerely,        Kevyn Reese MD        CC: No Recipients  Kevyn Reese MD  1/16/2025  8:50 AM  Signed  ICC VISIT NOTE       Chief Complaint   Patient presents with   • Neck Pain     Patient c/o neck pain for 3 days. Pain is worse today Has difficulty turning head. Taking Tylenol and applying heat with little relief. Patient states he recently started working out.      Patient seen at 8:31 AM    History Of Present Illness  Zbigniew is a 65 year old male presenting with neck pain and stiffness, primarily on the left for three days.  Getting worse.  Taking Tylenol, which helps a bit.  Also having occipital headaches.  No chest pain, numbness, fevers, nausea, or vomiting. Has been working out and doing some pull-downs.  That aggravated his chronic shoulder issues a bit, but doesn't recall an actual injury or sudden pain.       Past Medical History  Past Medical History:   Diagnosis Date   • Essential (primary) hypertension    • Hyperlipidemia         Surgical History  Past Surgical History:   Procedure Laterality Date   • Joint replacement     • Total hip replacement Left         Social History  Social History     Tobacco Use   • Smoking status: Never   • Smokeless tobacco: Never   Substance Use Topics   • Alcohol use: Yes   • Drug use: Never       Family History    Family History   Problem Relation Age of Onset   • Lymphoma Father         FH reviewed and negative for any heart or lung disease, bleeding disorders, or issues related to this complaint.     Allergies  ALLERGIES:  Patient has no known  Last seen 1/3 please advise   allergies.    Medications  Current Outpatient Medications   Medication Sig   • losartan (COZAAR) 50 MG tablet Take 50 mg by mouth daily.   • methocarbamol (ROBAXIN) 500 MG tablet Take 1 tablet by mouth 3 times daily as needed for Muscle spasms.   • RAMIPRIL PO  (Patient not taking: Reported on 1/16/2025)   • clopidogrel (PLAVIX) 75 MG tablet Take 75 mg by mouth daily.   • EZETIMIBE PO    • HYDROCHLOROTHIAZIDE PO    • AMLODIPINE BESYLATE PO    • METOPROLOL SUCCINATE PO  (Patient not taking: Reported on 1/16/2025)   • ATORVASTATIN CALCIUM PO    • aspirin 81 MG tablet Take 81 mg by mouth daily. (Patient not taking: Reported on 1/16/2025)   • Omeprazole (PRILOSEC PO)      No current facility-administered medications for this visit.        Review of Systems  Constitutional: Negative for fever and chills.  Skin: Negative for rash.  HEENT: Negative for eye drainage, ear pain, rhinorrhea, sinus congestion, sore throat.  Respiratory: Negative wheezing, shortness of breath, cough.  Cardiovascular: Negative for chest pain, chest pressure, palpitations or diaphoresis.  Gastrointestinal: Negative for nausea, vomiting, diarrhea, abdominal pain.  Genitourinary: Negative for dysuria, urgency, frequency, hematuria or flank pain.  Extremities:  Negative for joint swelling, joint pain.  Neurologic:  Negative for change in sensory or motor function.  Negative for headache.  Endocrine: Negative for heat or cold intolerance, weight loss or gain.  Hematological: Negative for bleeding, bruising or adenopathy.  Psychiatric: Negative for change in affect, change in mentation or sleep disturbance.  All other systems reviewed and otherwise negative unless noted.       Last Recorded Vitals  Vitals:    01/16/25 0820   BP: 132/86   BP Location: LUE - Left upper extremity   Patient Position: Sitting   Cuff Size: Large Adult   Pulse: 89   Resp: 18   Temp: 97.5 °F (36.4 °C)   TempSrc: Oral   SpO2: 98%   Weight: 102.1 kg (225 lb)   Height: 5' 10\"  (1.778 m)   PainSc: 6    PainLoc: Head        Physical Exam  Vitals and nursing note reviewed.   Constitutional:       General: He is not in acute distress.     Appearance: He is not ill-appearing or toxic-appearing.   HENT:      Head: Normocephalic and atraumatic.   Eyes:      Pupils: Pupils are equal, round, and reactive to light.   Cardiovascular:      Rate and Rhythm: Normal rate and regular rhythm.   Pulmonary:      Effort: Pulmonary effort is normal.   Musculoskeletal:         General: No deformity. Normal range of motion.      Cervical back: Normal range of motion and neck supple. Tenderness (minimal left > right posterior neck.) present. No swelling, deformity, rigidity, spasms, torticollis or bony tenderness. Pain with movement present. Normal range of motion.   Skin:     General: Skin is warm and dry.   Neurological:      Mental Status: He is alert.   Psychiatric:         Mood and Affect: Mood and affect normal.         Judgment: Judgment normal.          EKG  Time: 8:43 am  Rate: 75  Rhythm: NSR, LAE, LVH - No ST wave changes  EKG interpreted by ICC MD and agree with EKG machine reading.        Differential Diagnosis/MDM:  Diagnoses that have been ruled out:   ACS unlikely   Diagnoses that are still under consideration:   Patient presents to [facility] for evaluation of neck pain. Suspect that patient has cervical sprain as etiology for their symptoms.   They were instructed to follow up with PCP. All questions were answered and criteria necessitating return visit to ICC/ED was discussed     Ddx: sprain, cardiac  2.  Symptomatic care discussed.  See patient AVS instructions for any further details.  3.  Instructed patient on appropriate medical follow-up.  See below  4.  Further evaluation: PCP for BP and recheck.  ED if worsening or not improving.   5.  Patient will be contacted with any positive or discordant results via phone.      The plan was discussed verbally and key components were summarized in the  discharge instructions. All questions were answered. In discussing management and follow-up, they are advised that the plan is based only on information that was available at the time of the ICC evaluation. Additional testing and treatment may be necessary, and outpatient evaluation is frequently required to ensure complete care. At the same time, there is always potential for symptoms to recur or worsen, or for additional signs or symptoms to develop that could substantially affect the treatment plan. If any new or uncontrolled  symptoms occur, or if there are any other concerns, they are advised to seek medical evaluation immediately.     Condition on Discharge: Good   Final diagnoses:   1. Cervical sprain, initial encounter    2. Elevated blood pressure reading    3. Neck pain on left side            Diagnosis:   (S13.9XXA) Cervical sprain, initial encounter  (primary encounter diagnosis)  Plan: Electrocardiogram 12-Lead, Electrocardiogram         12-Lead, methocarbamol (ROBAXIN) 500 MG tablet    (R03.0) Elevated blood pressure reading  Plan: Electrocardiogram 12-Lead, Electrocardiogram         12-Lead    (M54.2) Neck pain on left side  Plan: Electrocardiogram 12-Lead, Electrocardiogram         12-Lead, methocarbamol (ROBAXIN) 500 MG tablet      New Prescriptions    METHOCARBAMOL (ROBAXIN) 500 MG TABLET    Take 1 tablet by mouth 3 times daily as needed for Muscle spasms.       Follow-up Information       Follow up With Specialties Details Why Contact Info    Dejan Arreola MD Internal Medicine Follow up in 4 day(s) For elevated blood pressure., For recheck [address]  [facility] [address] [phone]                Patient Instructions   Tylenol   Do not drive if taking Robaxin.  Take with food.  May cause drowsiness and upset stomach.  Continue heat to the neck  Follow up with PCP within a week for recheck and your elevated blood pressure reading.   Go to the Emergency Room if ANY worsening  or failure to improve.     Thank you for choosing [facility] for your healthcare needs.    We strive to provide you with excellent service and hope that we have exceeded your expectations.     If you receive a survey in the mail, we hope that you will complete it and agree that we have provided \"Very Good\" care today.  If you would like to speak to us about your visit, please contact our center at:    [facility]  [phone]    [facility]  [phone]    [facility]  [phone]      Primary Care Physician  Dejan Arreola MD David L Thomas, MD    The 21st Century Cures Act makes medical notes like these available to patients in the interest of transparency. However, be advised this is a medical document. It is intended as peer to peer communication. It is written in medical language and may contain abbreviations, jargon, and other verbiage that could be misleading or confusing to lay persons. It may appear blunt or direct. Medical documents are intended to carry relevant information, facts as evident, and the clinical opinion of the medical provider.

## 2025-01-23 ENCOUNTER — OFFICE VISIT (OUTPATIENT)
Dept: INTERNAL MEDICINE | Age: 75
End: 2025-01-23
Payer: MEDICARE

## 2025-01-23 VITALS
OXYGEN SATURATION: 96 % | HEART RATE: 50 BPM | HEIGHT: 61 IN | WEIGHT: 164 LBS | DIASTOLIC BLOOD PRESSURE: 76 MMHG | SYSTOLIC BLOOD PRESSURE: 124 MMHG | BODY MASS INDEX: 30.96 KG/M2 | TEMPERATURE: 97.1 F

## 2025-01-23 DIAGNOSIS — I10 ESSENTIAL HYPERTENSION: Primary | ICD-10-CM

## 2025-01-23 DIAGNOSIS — E78.2 MIXED HYPERLIPIDEMIA: ICD-10-CM

## 2025-01-23 DIAGNOSIS — R80.9 TYPE 2 DIABETES MELLITUS WITH MICROALBUMINURIA, WITHOUT LONG-TERM CURRENT USE OF INSULIN: ICD-10-CM

## 2025-01-23 DIAGNOSIS — E11.29 TYPE 2 DIABETES MELLITUS WITH MICROALBUMINURIA, WITHOUT LONG-TERM CURRENT USE OF INSULIN: ICD-10-CM

## 2025-01-23 LAB
ALBUMIN SERPL-MCNC: 4.3 G/DL (ref 3.5–5.2)
ALBUMIN/GLOB SERPL: 1.7 G/DL
ALP SERPL-CCNC: 95 U/L (ref 39–117)
ALT SERPL-CCNC: 14 U/L (ref 1–33)
AST SERPL-CCNC: 15 U/L (ref 1–32)
BILIRUB SERPL-MCNC: 0.4 MG/DL (ref 0–1.2)
BUN SERPL-MCNC: 13 MG/DL (ref 8–23)
BUN/CREAT SERPL: 19.4 (ref 7–25)
CALCIUM SERPL-MCNC: 10.4 MG/DL (ref 8.6–10.5)
CHLORIDE SERPL-SCNC: 100 MMOL/L (ref 98–107)
CHOLEST SERPL-MCNC: 130 MG/DL (ref 0–200)
CHOLEST/HDLC SERPL: 2.13 {RATIO}
CO2 SERPL-SCNC: 27.7 MMOL/L (ref 22–29)
CREAT SERPL-MCNC: 0.67 MG/DL (ref 0.57–1)
EGFRCR SERPLBLD CKD-EPI 2021: 92.4 ML/MIN/1.73
GLOBULIN SER CALC-MCNC: 2.5 GM/DL
GLUCOSE SERPL-MCNC: 112 MG/DL (ref 65–99)
HBA1C MFR BLD: 6.3 % (ref 4.8–5.6)
HDLC SERPL-MCNC: 61 MG/DL (ref 40–60)
LDLC SERPL CALC-MCNC: 54 MG/DL (ref 0–100)
POTASSIUM SERPL-SCNC: 4.6 MMOL/L (ref 3.5–5.2)
PROT SERPL-MCNC: 6.8 G/DL (ref 6–8.5)
SODIUM SERPL-SCNC: 136 MMOL/L (ref 136–145)
TRIGL SERPL-MCNC: 75 MG/DL (ref 0–150)
VLDLC SERPL CALC-MCNC: 15 MG/DL (ref 5–40)

## 2025-01-23 PROCEDURE — 1160F RVW MEDS BY RX/DR IN RCRD: CPT

## 2025-01-23 PROCEDURE — 3078F DIAST BP <80 MM HG: CPT

## 2025-01-23 PROCEDURE — 1125F AMNT PAIN NOTED PAIN PRSNT: CPT

## 2025-01-23 PROCEDURE — G2211 COMPLEX E/M VISIT ADD ON: HCPCS

## 2025-01-23 PROCEDURE — 1159F MED LIST DOCD IN RCRD: CPT

## 2025-01-23 PROCEDURE — 3074F SYST BP LT 130 MM HG: CPT

## 2025-01-23 PROCEDURE — 99214 OFFICE O/P EST MOD 30 MIN: CPT

## 2025-01-23 NOTE — PROGRESS NOTES
"    I N T E R N A L  M E D I C I N E  Jnaie Douglas, APRJOEL    ENCOUNTER DATE:  01/23/2025    Kimi A Alberto / 74 y.o. / female      CHIEF COMPLAINT / REASON FOR OFFICE VISIT     Hypertension, Hyperlipidemia, and Diabetes      ASSESSMENT & PLAN     Diagnoses and all orders for this visit:    1. Essential hypertension (Primary)    2. Type 2 diabetes mellitus with microalbuminuria, without long-term current use of insulin    3. Mixed hyperlipidemia  Overview:  Continue atorvastatin 20 mg daily.           SUMMARY/DISCUSSION  Monitor BP at home for goal of < 130/80.    Continue low carb diet, regular exercise.  If A1C increases to greater > 7.0, consider adding SGLT2 (Farxiga) for additional benefit of kidney protection.  Unable to afford GLP-1 agents.  Continue low fat diet.      Next Appointment with me: Visit date not found    No follow-ups on file.      VITAL SIGNS     Visit Vitals  /76   Pulse 50   Temp 97.1 °F (36.2 °C)   Ht 154.9 cm (60.98\")   Wt 74.4 kg (164 lb)   SpO2 96%   BMI 31.01 kg/m²             Wt Readings from Last 3 Encounters:   01/23/25 74.4 kg (164 lb)   12/18/24 75.8 kg (167 lb)   07/22/24 75.8 kg (167 lb)     Body mass index is 31.01 kg/m².        MEDICATIONS AT THE TIME OF OFFICE VISIT     Current Outpatient Medications on File Prior to Visit   Medication Sig Dispense Refill    amLODIPine (NORVASC) 10 MG tablet TAKE 1 TABLET BY MOUTH DAILY 90 tablet 1    atorvastatin (LIPITOR) 20 MG tablet TAKE 1 TABLET BY MOUTH DAILY 90 tablet 1    carvedilol (COREG) 3.125 MG tablet TAKE 1 TABLET BY MOUTH TWICE A DAY WITH A MEAL 180 tablet 1    cyancobalamin (VITAMIN B-12) 100 MCG tablet Take  by mouth daily. As directed      lisinopril (PRINIVIL,ZESTRIL) 40 MG tablet TAKE 1 TABLET BY MOUTH DAILY 90 tablet 1    Melatonin 10 MG tablet Take 1 tablet by mouth Every Evening.      metFORMIN ER (GLUCOPHAGE-XR) 500 MG 24 hr tablet TAKE 2 TABLETS BY MOUTH TWICE A  tablet 1    Multiple Vitamins-Minerals " (MULTIVITAMIN ADULT PO) Take 1 tablet by mouth Daily. HOLD PRIOR TO SURGERY      Multiple Vitamins-Minerals (OCUVITE ADULT 50+) capsule Take 1 capsule by mouth Daily. HOLD PRIOR TO SURGERY      pioglitazone (ACTOS) 15 MG tablet Take 1 tablet by mouth Every Morning. 90 tablet 1    [DISCONTINUED] Tetanus-Diphth-Acell Pertussis (Boostrix) 5-2.5-18.5 LF-MCG/0.5 injection Inject  into the appropriate muscle as directed by prescriber. 0.5 mL 0    GLUCOSAMINE-CHONDROITIN PO Take 1 tablet by mouth. HOLD PRIOR TO SURGERY (Patient not taking: Reported on 1/23/2025)      Ibuprofen 3 %, Baclofen 2 %, lidocaine 4 % Apply 1-2 g topically to the appropriate area as directed 3 (Three) to 4 (Four) times daily. (Patient not taking: Reported on 1/23/2025) 240 g 3    Omega-3 Fatty Acids (FISH OIL) 1000 MG capsule capsule Take 3 capsules by mouth Daily. HOLD PRIOR TO SURGERY (Patient not taking: Reported on 1/23/2025)      Semaglutide (Rybelsus) 7 MG tablet Take 7 mg by mouth Daily. (Patient not taking: Reported on 1/23/2025) 90 tablet 0     No current facility-administered medications on file prior to visit.        HISTORY OF PRESENT ILLNESS     Feels well.       HTN: Remains well controlled on carvedilol 3.125 mg BID, lisinopril 40 mg daily, amlodipine 10 mg daily.       Type 2 diabetes: July 2024 Hemoglobin A1C of 6.4.  Remains on metformin 1000 mg BID, Actos 15 mg daily.  Stopped taking Rybelsus 7 mg daily in the last month due to cost concerns.   FBS average 100-120s.  No episodes of hypoglycemia.  Weight is stable.  Up to date with diabetic eye exam.     HLD: Remains well controlled on atorvastatin 20 mg daily and fish oil supplementation.  July 2024 Lipid panel with triglycerides 72; LDL 50.       October 2024 Mammogram showed no evidence of malignancy.      March 2024 DEXA showed normal bone density.     Colonoscopy next due October 2028.      Patient Care Team:  Janie Douglas APRN as PCP - General (Family  Medicine)  Jess Vo MD as Consulting Physician (Dermatology)  Latha Holloway APRN as Nurse Practitioner (Obstetrics and Gynecology)    REVIEW OF SYSTEMS     Review of Systems   Constitutional:  Negative for chills, fever and unexpected weight change.   Respiratory:  Negative for cough, chest tightness and shortness of breath.    Cardiovascular:  Negative for chest pain, palpitations and leg swelling.   Neurological:  Negative for dizziness, weakness, light-headedness and headaches.   Psychiatric/Behavioral:  The patient is not nervous/anxious.           PHYSICAL EXAMINATION     Physical Exam  Vitals reviewed.   Constitutional:       General: She is not in acute distress.     Appearance: Normal appearance. She is not ill-appearing, toxic-appearing or diaphoretic.   HENT:      Head: Normocephalic and atraumatic.   Cardiovascular:      Rate and Rhythm: Normal rate and regular rhythm.      Heart sounds: Normal heart sounds.   Pulmonary:      Effort: Pulmonary effort is normal.      Breath sounds: Normal breath sounds.   Musculoskeletal:      Right lower leg: No edema.      Left lower leg: No edema.   Neurological:      Mental Status: She is alert and oriented to person, place, and time. Mental status is at baseline.   Psychiatric:         Mood and Affect: Mood normal.         Behavior: Behavior normal.         Thought Content: Thought content normal.         Judgment: Judgment normal.           REVIEWED DATA     Labs:           Imaging:            Medical Tests:           Summary of old records / correspondence / consultant report:           Request outside records:

## 2025-01-31 DIAGNOSIS — E11.9 TYPE 2 DIABETES MELLITUS WITHOUT COMPLICATION, WITHOUT LONG-TERM CURRENT USE OF INSULIN: Chronic | ICD-10-CM

## 2025-01-31 RX ORDER — METFORMIN HYDROCHLORIDE 500 MG/1
1000 TABLET, EXTENDED RELEASE ORAL 2 TIMES DAILY
Qty: 360 TABLET | Refills: 1 | Status: SHIPPED | OUTPATIENT
Start: 2025-01-31

## 2025-02-06 DIAGNOSIS — E78.2 MIXED HYPERLIPIDEMIA: Chronic | ICD-10-CM

## 2025-02-06 RX ORDER — ATORVASTATIN CALCIUM 20 MG/1
20 TABLET, FILM COATED ORAL DAILY
Qty: 90 TABLET | Refills: 1 | Status: SHIPPED | OUTPATIENT
Start: 2025-02-06

## 2025-02-19 RX ORDER — AMLODIPINE BESYLATE 10 MG/1
10 TABLET ORAL DAILY
Qty: 90 TABLET | Refills: 1 | Status: SHIPPED | OUTPATIENT
Start: 2025-02-19

## 2025-03-03 DIAGNOSIS — E11.9 TYPE 2 DIABETES MELLITUS WITHOUT COMPLICATION, WITHOUT LONG-TERM CURRENT USE OF INSULIN: Primary | ICD-10-CM

## 2025-03-03 RX ORDER — ORAL SEMAGLUTIDE 3 MG/1
3 TABLET ORAL DAILY
Qty: 30 TABLET | Refills: 0 | Status: SHIPPED | OUTPATIENT
Start: 2025-03-03

## 2025-03-12 ENCOUNTER — HOSPITAL ENCOUNTER (OUTPATIENT)
Facility: HOSPITAL | Age: 75
Discharge: HOME | End: 2025-03-12
Attending: EMERGENCY MEDICINE | Admitting: EMERGENCY MEDICINE
Payer: MEDICARE

## 2025-03-12 VITALS
HEIGHT: 61 IN | DIASTOLIC BLOOD PRESSURE: 61 MMHG | BODY MASS INDEX: 30.21 KG/M2 | RESPIRATION RATE: 18 BRPM | SYSTOLIC BLOOD PRESSURE: 146 MMHG | WEIGHT: 160 LBS | HEART RATE: 93 BPM | TEMPERATURE: 98.7 F | OXYGEN SATURATION: 93 %

## 2025-03-12 DIAGNOSIS — R05.1 ACUTE COUGH: ICD-10-CM

## 2025-03-12 DIAGNOSIS — J40 BRONCHITIS: Primary | ICD-10-CM

## 2025-03-12 PROCEDURE — 99213 OFFICE O/P EST LOW 20 MIN: CPT | Performed by: NURSE PRACTITIONER

## 2025-03-12 PROCEDURE — G0463 HOSPITAL OUTPT CLINIC VISIT: HCPCS | Performed by: NURSE PRACTITIONER

## 2025-03-12 PROCEDURE — 25010000002 DEXAMETHASONE SODIUM PHOSPHATE 10 MG/ML SOLUTION: Performed by: NURSE PRACTITIONER

## 2025-03-12 RX ORDER — AZITHROMYCIN 500 MG/1
500 TABLET, FILM COATED ORAL DAILY
Qty: 3 TABLET | Refills: 0 | Status: SHIPPED | OUTPATIENT
Start: 2025-03-13 | End: 2025-03-16

## 2025-03-12 RX ORDER — PREDNISONE 20 MG/1
40 TABLET ORAL DAILY
Qty: 6 TABLET | Refills: 0 | Status: SHIPPED | OUTPATIENT
Start: 2025-03-12 | End: 2025-03-15

## 2025-03-12 RX ORDER — ALBUTEROL SULFATE 90 UG/1
2 INHALANT RESPIRATORY (INHALATION) EVERY 4 HOURS PRN
Qty: 6.7 G | Refills: 0 | Status: SHIPPED | OUTPATIENT
Start: 2025-03-12

## 2025-03-12 RX ORDER — DEXAMETHASONE SODIUM PHOSPHATE 10 MG/ML
10 INJECTION, SOLUTION INTRAMUSCULAR; INTRAVENOUS ONCE
Status: COMPLETED | OUTPATIENT
Start: 2025-03-12 | End: 2025-03-12

## 2025-03-12 RX ORDER — GUAIFENESIN 600 MG/1
1200 TABLET, EXTENDED RELEASE ORAL 2 TIMES DAILY
Qty: 28 TABLET | Refills: 0 | Status: SHIPPED | OUTPATIENT
Start: 2025-03-12 | End: 2025-03-19

## 2025-03-12 RX ORDER — BENZONATATE 200 MG/1
200 CAPSULE ORAL 3 TIMES DAILY PRN
Qty: 21 CAPSULE | Refills: 0 | Status: SHIPPED | OUTPATIENT
Start: 2025-03-12 | End: 2025-03-19

## 2025-03-12 RX ADMIN — DEXAMETHASONE SODIUM PHOSPHATE 10 MG: 10 INJECTION, SOLUTION INTRAMUSCULAR; INTRAVENOUS at 09:25

## 2025-03-12 NOTE — FSED PROVIDER NOTE
Subjective   History of Present Illness  74 yr old female presents C/O cough for 1 week and feeling weak.  She thinks she had the flu last week and most symptoms have resolved but still has cough.  Her secretions are thick.  Denies N/V/D, ear pain, and sore throat.  She has been SOA some and wheezing off and on.  Coughs frequently. Has a decrease in appetite.        Review of Systems   Constitutional:  Positive for appetite change.   HENT: Negative.     Respiratory:  Positive for cough, shortness of breath and wheezing.    Gastrointestinal: Negative.    Genitourinary: Negative.    Musculoskeletal: Negative.    Skin: Negative.    Neurological: Negative.        Past Medical History:   Diagnosis Date    Arthritis     B12 deficiency     Diabetes mellitus     Female stress incontinence     Hyperlipidemia     Hypertension     Loose stools     Obesity     Rectocele 2018    Shingles     left eye    Skin cancer     Urinary incontinence        No Known Allergies    Past Surgical History:   Procedure Laterality Date    ADENOIDECTOMY      CATARACT EXTRACTION Bilateral     COLONOSCOPY      COLONOSCOPY N/A 10/04/2018    Procedure: COLONOSCOPY to Cecum WITH BIOPSY;  Surgeon: Geovanna Rebollar MD;  Location: [facility] ENDOSCOPY;  Service: General    EAR RECONSTRUCTION Left     KNEE ARTHROSCOPY Left     MOHS SURGERY      PAP SMEAR      POSTERIOR VAGINAL REPAIR  2018    for rectocele    PUBOVAGINAL SLING N/A 2021    Procedure: pubovaginal sling cystourethroscopy;  Surgeon: Janneth Matamoros MD;  Location: [facility] MAIN OR;  Service: Gynecology;  Laterality: N/A;    SKIN CANCER DESTRUCTION      basal cells removed on nose bridge, left thigh squamous removed    TONSILLECTOMY      TUBAL ABDOMINAL LIGATION         Family History   Problem Relation Age of Onset    Stroke Mother     Arthritis Mother     Coronary artery disease Father          82    Prostate cancer Father     Hypertension Father     Cancer Father      No Known Problems Sister     Diabetes Paternal Grandmother         Type 2    Breast cancer Maternal Aunt     Thyroid cancer Neg Hx     Colon cancer Neg Hx     Malig Hyperthermia Neg Hx        Social History     Socioeconomic History    Marital status:      Spouse name: Avtar Srinivasan)    Number of children: 3   Tobacco Use    Smoking status: Light Smoker     Types: Cigarettes    Smokeless tobacco: Never   Vaping Use    Vaping status: Never Used   Substance and Sexual Activity    Alcohol use: Yes     Comment: RARELY    Drug use: No    Sexual activity: Not Currently     Partners: Male     Birth control/protection: Tubal ligation, Birth control pill           Objective   Physical Exam  Vitals and nursing note reviewed.   Constitutional:       Appearance: Normal appearance.   HENT:      Right Ear: Hearing, tympanic membrane, ear canal and external ear normal.      Left Ear: Hearing, tympanic membrane, ear canal and external ear normal.      Nose: Nose normal.      Mouth/Throat:      Lips: Pink.      Mouth: Mucous membranes are moist.      Pharynx: Oropharynx is clear. Uvula midline. Postnasal drip present.   Pulmonary:      Effort: Pulmonary effort is normal.      Breath sounds: Normal air entry. Examination of the left-lower field reveals wheezing. Wheezing present.      Comments: Coughing off and on during visit  Lymphadenopathy:      Head:      Right side of head: No submental, submandibular, tonsillar, preauricular, posterior auricular or occipital adenopathy.      Left side of head: No submental, submandibular, tonsillar, preauricular, posterior auricular or occipital adenopathy.      Cervical:      Right cervical: No superficial, deep or posterior cervical adenopathy.     Left cervical: No superficial, deep or posterior cervical adenopathy.   Skin:     General: Skin is warm and dry.      Capillary Refill: Capillary refill takes less than 2 seconds.   Neurological:      Mental Status: She is alert.          Procedures           ED Course                                           Medical Decision Making  Problems Addressed:  Acute cough: complicated acute illness or injury  Bronchitis: complicated acute illness or injury    Risk  OTC drugs.  Prescription drug management.        Final diagnoses:   Bronchitis   Acute cough       ED Disposition  ED Disposition       ED Disposition   Discharge    Condition   Stable    Comment   --               Janie Douglas, SHADY  [address]  [place] [id number] [phone]      As needed, If symptoms worsen         Medication List        New Prescriptions      albuterol sulfate  (90 Base) MCG/ACT inhaler  Commonly known as: PROVENTIL HFA;VENTOLIN HFA;PROAIR HFA  Inhale 2 puffs Every 4 (Four) Hours As Needed for Wheezing or Shortness of Air.     azithromycin 500 MG tablet  Commonly known as: ZITHROMAX  Take 1 tablet by mouth Daily for 3 days.  Start taking on: March 13, 2025     benzonatate 200 MG capsule  Commonly known as: TESSALON  Take 1 capsule by mouth 3 (Three) Times a Day As Needed for Cough for up to 7 days.     guaiFENesin 600 MG 12 hr tablet  Commonly known as: MUCINEX  Take 2 tablets by mouth 2 (Two) Times a Day for 7 days.     predniSONE 20 MG tablet  Commonly known as: DELTASONE  Take 2 tablets by mouth Daily for 3 days.               Where to Get Your Medications        These medications were sent to [id number] - [place], [place] - [address] [facility] [phone] [facility] [phone]   [address], [facility] [address]      Phone: [phone]   albuterol sulfate  (90 Base) MCG/ACT inhaler  azithromycin 500 MG tablet  benzonatate 200 MG capsule  guaiFENesin 600 MG 12 hr tablet  predniSONE 20 MG tablet

## 2025-03-24 ENCOUNTER — OFFICE VISIT (OUTPATIENT)
Dept: SPORTS MEDICINE | Facility: CLINIC | Age: 75
End: 2025-03-24
Payer: MEDICARE

## 2025-03-24 VITALS
SYSTOLIC BLOOD PRESSURE: 118 MMHG | OXYGEN SATURATION: 99 % | TEMPERATURE: 98.2 F | RESPIRATION RATE: 16 BRPM | WEIGHT: 160 LBS | HEART RATE: 77 BPM | DIASTOLIC BLOOD PRESSURE: 78 MMHG | HEIGHT: 61 IN | BODY MASS INDEX: 30.21 KG/M2

## 2025-03-24 DIAGNOSIS — M25.562 CHRONIC PAIN OF LEFT KNEE: Primary | ICD-10-CM

## 2025-03-24 DIAGNOSIS — G89.29 CHRONIC PAIN OF LEFT KNEE: Primary | ICD-10-CM

## 2025-03-24 DIAGNOSIS — M17.12 PRIMARY OSTEOARTHRITIS OF LEFT KNEE: ICD-10-CM

## 2025-03-24 PROCEDURE — 99213 OFFICE O/P EST LOW 20 MIN: CPT | Performed by: FAMILY MEDICINE

## 2025-03-24 PROCEDURE — 1159F MED LIST DOCD IN RCRD: CPT | Performed by: FAMILY MEDICINE

## 2025-03-24 PROCEDURE — 3074F SYST BP LT 130 MM HG: CPT | Performed by: FAMILY MEDICINE

## 2025-03-24 PROCEDURE — 3078F DIAST BP <80 MM HG: CPT | Performed by: FAMILY MEDICINE

## 2025-03-24 PROCEDURE — 20610 DRAIN/INJ JOINT/BURSA W/O US: CPT | Performed by: FAMILY MEDICINE

## 2025-03-24 PROCEDURE — 1160F RVW MEDS BY RX/DR IN RCRD: CPT | Performed by: FAMILY MEDICINE

## 2025-03-24 RX ORDER — TRIAMCINOLONE ACETONIDE 40 MG/ML
40 INJECTION, SUSPENSION INTRA-ARTICULAR; INTRAMUSCULAR
Status: COMPLETED | OUTPATIENT
Start: 2025-03-24 | End: 2025-03-24

## 2025-03-24 RX ADMIN — TRIAMCINOLONE ACETONIDE 40 MG: 40 INJECTION, SUSPENSION INTRA-ARTICULAR; INTRAMUSCULAR at 16:01

## 2025-03-24 NOTE — PROGRESS NOTES
"Kimi is a 74 y.o. year old female presents to [facility]    Chief Complaint   Patient presents with    Knee Pain     Left knee pain for several years, KNI       History of Present Illness  History of Present Illness  The patient presents for evaluation of knee pain.    She is seeking another injection today as she is going on a vacation. She reports that her knee has been performing well, even in cold weather conditions, which typically exacerbate her symptoms. She plans to return in 06/2025 for an additional injection, as she has scheduled another vacation.    I have reviewed the patient's medical, family, and social history in detail and updated the computerized patient record.    /78 (BP Location: Left arm, Patient Position: Sitting, Cuff Size: Adult)   Pulse 77   Temp 98.2 °F (36.8 °C)   Resp 16   Ht 154.9 cm (61\")   Wt 72.6 kg (160 lb)   SpO2 99%   BMI 30.23 kg/m²      Physical Exam    Vital signs reviewed.   General: No acute distress.  Eyes: conjunctiva clear; pupils equally round and reactive  ENT: external ears atraumatic  CV: no peripheral edema  Resp: normal respiratory effort, no use of accessory muscles  Skin: no rashes or wounds; normal turgor  Psych: mood and affect appropriate; recent and remote memory intact  Neuro: sensation to light touch intact    MSK Exam  Physical Exam  L knee: no effusion, full ROM. + retropatellar crepitus    Left Knee X-Ray  Indication: Pain    Views: AP, lateral, sunrise    Findings:  No fracture  No bony lesion  Normal soft tissues  Severe tricompartmental osteoarthritis, bone-on-bone phenomenon within the medial space    Prior studies were available for comparison.        Large Joint Arthrocentesis: L knee  Date/Time: 3/24/2025 4:01 PM  Consent given by: patient  Timeout: Immediately prior to procedure a time out was called to verify the correct patient, procedure, equipment, support staff and site/side marked as required "   Supporting Documentation  Indications: pain   Procedure Details  Location: knee - L knee  Needle size: 25 G  Approach: anterolateral  Medications administered: 40 mg triamcinolone acetonide 40 MG/ML  Patient tolerance: patient tolerated the procedure well with no immediate complications          Diagnoses and all orders for this visit:    Chronic pain of left knee    Primary osteoarthritis of left knee  -     XR Knee 3+ View With Sunrise Left  -     Large Joint Arthrocentesis      Assessment & Plan  1. Knee pain.  Radiographic imaging reveals a progression in the severity of her condition, with the medial compartment of her knee demonstrating bone-on-bone contact. Despite this, she reports significant relief from her current treatment regimen and does not wish to alter it at this time. She will receive an injection today to manage her symptoms during her upcoming vacation. She plans to return in June for another injection before her next trip.          Follow Up     Patient was given instructions and counseling regarding her condition or for health maintenance advice. Please see specific information pulled into the AVS if appropriate.     Patient or patient representative verbalized consent for the use of Ambient Listening during the visit with  JESSI Thornton Jr.,  for chart documentation. 3/24/2025  13:18 EDT

## 2025-04-09 DIAGNOSIS — E11.9 TYPE 2 DIABETES MELLITUS WITHOUT COMPLICATION, WITHOUT LONG-TERM CURRENT USE OF INSULIN: Primary | ICD-10-CM

## 2025-04-09 RX ORDER — ORAL SEMAGLUTIDE 7 MG/1
7 TABLET ORAL DAILY
Qty: 90 TABLET | Refills: 1 | Status: SHIPPED | OUTPATIENT
Start: 2025-04-09

## 2025-05-13 ENCOUNTER — TELEPHONE (OUTPATIENT)
Dept: SPORTS MEDICINE | Facility: CLINIC | Age: 75
End: 2025-05-13
Payer: MEDICARE

## 2025-05-13 DIAGNOSIS — M17.12 PRIMARY OSTEOARTHRITIS OF LEFT KNEE: Primary | ICD-10-CM

## 2025-05-13 NOTE — TELEPHONE ENCOUNTER
Order placed - outgoing call to the patient and informed of referral. They will contact for scheduling   Thanks  Natali

## 2025-05-13 NOTE — TELEPHONE ENCOUNTER
Caller: Kimi Dominguez A    Relationship to patient: Self    Best call back number: [phone]    Patient is needing: PT IS CALLING TO SEE IF WHAT THE NEXT STEPS ARE TO GET SCHEDULED FOR SX FOR THE LEFT KNEE.. PLEASE ADVISE..

## 2025-05-19 DIAGNOSIS — E11.29 TYPE 2 DIABETES MELLITUS WITH MICROALBUMINURIA, WITHOUT LONG-TERM CURRENT USE OF INSULIN: Chronic | ICD-10-CM

## 2025-05-19 DIAGNOSIS — R80.9 TYPE 2 DIABETES MELLITUS WITH MICROALBUMINURIA, WITHOUT LONG-TERM CURRENT USE OF INSULIN: Chronic | ICD-10-CM

## 2025-05-19 RX ORDER — PIOGLITAZONE 15 MG/1
15 TABLET ORAL EVERY MORNING
Qty: 90 TABLET | Refills: 1 | Status: SHIPPED | OUTPATIENT
Start: 2025-05-19

## 2025-05-27 ENCOUNTER — PREP FOR SURGERY (OUTPATIENT)
Dept: OTHER | Facility: HOSPITAL | Age: 75
End: 2025-05-27
Payer: MEDICARE

## 2025-05-27 ENCOUNTER — OFFICE VISIT (OUTPATIENT)
Dept: ORTHOPEDIC SURGERY | Facility: CLINIC | Age: 75
End: 2025-05-27
Payer: MEDICARE

## 2025-05-27 VITALS — TEMPERATURE: 98 F | WEIGHT: 165.7 LBS | BODY MASS INDEX: 31.28 KG/M2 | HEIGHT: 61 IN

## 2025-05-27 DIAGNOSIS — M16.11 PRIMARY OSTEOARTHRITIS OF RIGHT HIP: Primary | ICD-10-CM

## 2025-05-27 DIAGNOSIS — M16.11 PRIMARY OSTEOARTHRITIS OF RIGHT HIP: ICD-10-CM

## 2025-05-27 DIAGNOSIS — M17.12 PRIMARY OSTEOARTHRITIS OF LEFT KNEE: ICD-10-CM

## 2025-05-27 DIAGNOSIS — R52 PAIN: Primary | ICD-10-CM

## 2025-05-27 RX ORDER — CHLORHEXIDINE GLUCONATE 500 MG/1
CLOTH TOPICAL 2 TIMES DAILY
OUTPATIENT
Start: 2025-05-27

## 2025-05-27 RX ORDER — PREGABALIN 75 MG/1
150 CAPSULE ORAL ONCE
OUTPATIENT
Start: 2025-05-27 | End: 2025-05-27

## 2025-05-27 RX ORDER — MELOXICAM 15 MG/1
15 TABLET ORAL ONCE
OUTPATIENT
Start: 2025-05-27 | End: 2025-05-27

## 2025-05-27 NOTE — PROGRESS NOTES
Patient: Kimi Dominguez  YOB: 1950 75 y.o. female  Medical Record Number: [id number]    Chief Complaints:   Chief Complaint   Patient presents with    Left Knee - Initial Evaluation       History of Present Illness:Kimi Dominguez is a 75 y.o. female who presents new patient both myself as well as to the practice with complaints of worsening in left knee pain.  Patient has been seeing Dr. Thornton has had previous injections including cortisone injections, Visco injections, in the past with minimal improvement.  She would like to discuss options today in addition she started with some right groin pain several weeks ago, unfortunately that has progressively gotten worse and her main complaint today is actually her right hip    Allergies: No Known Allergies    Medications:   Current Outpatient Medications   Medication Sig Dispense Refill    amLODIPine (NORVASC) 10 MG tablet Take 1 tablet by mouth Daily. 90 tablet 1    atorvastatin (LIPITOR) 20 MG tablet TAKE 1 TABLET BY MOUTH DAILY 90 tablet 1    carvedilol (COREG) 3.125 MG tablet TAKE 1 TABLET BY MOUTH TWICE A DAY WITH A MEAL 180 tablet 1    cyancobalamin (VITAMIN B-12) 100 MCG tablet Take  by mouth daily. As directed      lisinopril (PRINIVIL,ZESTRIL) 40 MG tablet TAKE 1 TABLET BY MOUTH DAILY 90 tablet 1    Melatonin 10 MG tablet Take 1 tablet by mouth Every Evening.      metFORMIN ER (GLUCOPHAGE-XR) 500 MG 24 hr tablet TAKE 2 TABLETS BY MOUTH TWICE A  tablet 1    Multiple Vitamins-Minerals (MULTIVITAMIN ADULT PO) Take 1 tablet by mouth Daily. HOLD PRIOR TO SURGERY      Multiple Vitamins-Minerals (OCUVITE ADULT 50+) capsule Take 1 capsule by mouth Daily. HOLD PRIOR TO SURGERY      pioglitazone (ACTOS) 15 MG tablet TAKE 1 TABLET BY MOUTH EVERY MORNING 90 tablet 1    Semaglutide (Rybelsus) 7 MG tablet Take 7 mg by mouth Daily. 90 tablet 1    albuterol sulfate  (90 Base) MCG/ACT inhaler Inhale 2 puffs Every 4 (Four) Hours As Needed for Wheezing or  "Shortness of Air. 6.7 g 0    GLUCOSAMINE-CHONDROITIN PO Take 1 tablet by mouth. HOLD PRIOR TO SURGERY      Omega-3 Fatty Acids (FISH OIL) 1000 MG capsule capsule Take 3 capsules by mouth Daily. HOLD PRIOR TO SURGERY       No current facility-administered medications for this visit.         The following portions of the patient's history were reviewed and updated as appropriate: allergies, current medications, past family history, past medical history, past social history, past surgical history and problem list.    Review of Systems:   14 point review of systems was performed. All systems negative except pertinent positives/negatives listed in HPI above    Physical Exam:   Vitals:    05/27/25 1039   Temp: 98 °F (36.7 °C)   Weight: 75.2 kg (165 lb 11.2 oz)   Height: 154.9 cm (61\")       General: A and O x 3, ASA, NAD   Skin clear no unusual lesions noted  Right hip the patient is nontender palpation she does have severe pain with internal extra rotation with a positive Stinchfield positive logroll calf is soft and nontender  Left knee patient has no appreciable effusion 116 degrees flexion neutral in extension positive Alejandra negative Lachman       Radiology:  Xrays 3views (ap,lateral, sunrise) previous x-rays of the left knee were reviewed show bone-on-bone end-stage osteoarthritis with cyst and spur formation.  In addition 2 views of the right hip were ordered and reviewed today secondary to worsening of pain show bone-on-bone end-stage osteoarthritis with cyst and spur formation.  No comparative views available    Assessment/Plan: End-stage osteoarthritis right hip and left knee    Patient and I discussed options, she would like to proceed with right total hip replacement anterior approach same day home health  Continuation of conservative management vs. SELENE discussed.  The patient wishes to proceed with total hip replacement.  At this point the patient has failed the full gamut of conservative treatment and " stating complete understanding of the risks/benefits/ anternatives wishes to proceed with surgical treatment.    Risk and benefits of surgery were reviewed.  Including, but not limited to, blood clots, anesthesia risk, infection, leg length discrepancy, fracture, skin/leg numbness, failure of the implant, need for future surgeries, continued pain, hematoma, need for transfusion, and death, among others.  The patient understands and wishes to proceed.     The spectrum of treatment options were discussed with the patient in detail including both the nonoperative and operative treatment modalities and their respective risks and benefits.  After thorough discussion, the patient has elected to undergo surgical treatment.  The details of the surgical procedure were explained including the location of probable incisions and a description of the likely implants to be used.  Models and diagrams were used as educational resources. The patient understands the likely convalescence after surgery, as well as the rehabilitation required.  We thoroughly discussed the risks, benefits, and alternatives to surgery.  The risks include but are not limited to the risk of infection, joint stiffness, blood clots (including DVT and/or pulmonary embolus along with the risk of death), neurologic and/or vascular injury, fracture, dislocation, nonunion, malunion, need for further surgery including hardware failure requiring revision, and continued pain.  It was explained that if tissue has been repaired or reconstructed, there is also a chance of failure which may require further management.  Following the completion of the discussion, the patient expressed understanding of this planned course of care, all their questions were answered and consent will be obtained preoperatively.    Operative Plan: Anterior approach Total Hip Replacement  Outpatient         Julienne Arias, APRN  5/27/2025

## 2025-06-02 DIAGNOSIS — I10 PRIMARY HYPERTENSION: Chronic | ICD-10-CM

## 2025-06-02 RX ORDER — LISINOPRIL 40 MG/1
40 TABLET ORAL DAILY
Qty: 90 TABLET | Refills: 0 | Status: SHIPPED | OUTPATIENT
Start: 2025-06-02

## 2025-06-12 DIAGNOSIS — I10 ESSENTIAL HYPERTENSION: Chronic | ICD-10-CM

## 2025-06-12 RX ORDER — CARVEDILOL 3.12 MG/1
3.12 TABLET ORAL 2 TIMES DAILY WITH MEALS
Qty: 180 TABLET | Refills: 0 | Status: SHIPPED | OUTPATIENT
Start: 2025-06-12

## 2025-06-18 ENCOUNTER — PRE-ADMISSION TESTING (OUTPATIENT)
Dept: PREADMISSION TESTING | Facility: HOSPITAL | Age: 75
End: 2025-06-18
Payer: MEDICARE

## 2025-06-18 VITALS
BODY MASS INDEX: 31.15 KG/M2 | RESPIRATION RATE: 18 BRPM | HEIGHT: 61 IN | DIASTOLIC BLOOD PRESSURE: 82 MMHG | TEMPERATURE: 96 F | OXYGEN SATURATION: 98 % | SYSTOLIC BLOOD PRESSURE: 150 MMHG | WEIGHT: 165 LBS | HEART RATE: 92 BPM

## 2025-06-18 LAB
ANION GAP SERPL CALCULATED.3IONS-SCNC: 11.9 MMOL/L (ref 5–15)
BUN SERPL-MCNC: 9 MG/DL (ref 8–23)
BUN/CREAT SERPL: 17.6 (ref 7–25)
CALCIUM SPEC-SCNC: 9.7 MG/DL (ref 8.6–10.5)
CHLORIDE SERPL-SCNC: 100 MMOL/L (ref 98–107)
CO2 SERPL-SCNC: 27.1 MMOL/L (ref 22–29)
CREAT SERPL-MCNC: 0.51 MG/DL (ref 0.57–1)
DEPRECATED RDW RBC AUTO: 43.6 FL (ref 37–54)
EGFRCR SERPLBLD CKD-EPI 2021: 98.1 ML/MIN/1.73
ERYTHROCYTE [DISTWIDTH] IN BLOOD BY AUTOMATED COUNT: 13.6 % (ref 12.3–15.4)
GLUCOSE SERPL-MCNC: 122 MG/DL (ref 65–99)
HCT VFR BLD AUTO: 40.1 % (ref 34–46.6)
HGB BLD-MCNC: 12.7 G/DL (ref 12–15.9)
MCH RBC QN AUTO: 27.9 PG (ref 26.6–33)
MCHC RBC AUTO-ENTMCNC: 31.7 G/DL (ref 31.5–35.7)
MCV RBC AUTO: 88.1 FL (ref 79–97)
PLATELET # BLD AUTO: 376 10*3/MM3 (ref 140–450)
PMV BLD AUTO: 9 FL (ref 6–12)
POTASSIUM SERPL-SCNC: 4.1 MMOL/L (ref 3.5–5.2)
QT INTERVAL: 414 MS
QTC INTERVAL: 507 MS
RBC # BLD AUTO: 4.55 10*6/MM3 (ref 3.77–5.28)
SODIUM SERPL-SCNC: 139 MMOL/L (ref 136–145)
WBC NRBC COR # BLD AUTO: 9.2 10*3/MM3 (ref 3.4–10.8)

## 2025-06-18 PROCEDURE — 80048 BASIC METABOLIC PNL TOTAL CA: CPT

## 2025-06-18 PROCEDURE — 36415 COLL VENOUS BLD VENIPUNCTURE: CPT

## 2025-06-18 PROCEDURE — 85027 COMPLETE CBC AUTOMATED: CPT

## 2025-06-18 PROCEDURE — 93005 ELECTROCARDIOGRAM TRACING: CPT

## 2025-06-18 RX ORDER — SENNOSIDES 8.6 MG
650 CAPSULE ORAL EVERY 8 HOURS PRN
COMMUNITY

## 2025-06-18 NOTE — DISCHARGE INSTRUCTIONS
Take the following medications the morning of surgery:    CARVEDILOL AND AMLODIPINE      If you are on an Aspirin or a Blood Thinner please clarify with the surgeon and prescribing physician if and when you are to hold the medication or if you are to continue the medication.  If you are on prescription narcotic pain medication to control your pain you may also take that medication the morning of surgery.      General Instructions:     Do not eat solid food after midnight the night before surgery.  Clear liquids day of surgery are allowed but must be stopped at least two hours before your hospital arrival time.       Allowed clear liquids      Water, sodas, and tea or coffee with no cream or milk added.       12 to 20 ounces of a clear liquid that contains carbohydrates is recommended.  If non-diabetic, have Gatorade or Powerade.  If diabetic, have G2 or Powerade Zero.     Do not have liquids red in color.  Do not consume chicken, beef, pork or vegetable broth or bouillon cubes of any variety as they are not considered clear liquids and are not allowed.      Infants may have breast milk up to four hours before surgery.  Infants drinking formula may drink formula up to six hours before surgery.   Patients who avoid smoking, chewing tobacco and alcohol for 4 weeks prior to surgery have a reduced risk of post-operative complications.  Quit smoking as many days before surgery as you can.  Do not smoke, use chewing tobacco or drink alcohol the day of surgery.   If applicable bring your C-PAP/ BI-PAP machine in with you to preop day of surgery.  Bring any papers given to you in the doctor’s office.  Wear clean comfortable clothes.  Do not wear contact lenses, false eyelashes or make-up.  Bring a case for your glasses.   Bring crutches or walker if applicable.  Remove all piercings.  Leave jewelry and any other valuables at home.  Hair extensions with metal clips must be removed prior to surgery.  The Pre-Admission Testing  nurse will instruct you to bring medications if unable to obtain an accurate list in Pre-Admission Testing.    Day of surgery you will need to let the preoperative nurse know the last time you took each of your medications.  To ensure a safe environment for patients and staff, we kindly ask that children under the age of 16 not accompany patients.  If you must bring a dependent child or dependent adult please ensure a responsible adult, other than yourself, is present to supervise them.          Preventing a Surgical Site Infection:  For 2 to 3 days before surgery, avoid shaving with a razor because the razor can irritate skin and make it easier to develop an infection.    Any areas of open skin can increase the risk of a post-operative wound infection by allowing bacteria to enter and travel throughout the body.  Notify your surgeon if you have any skin wounds / rashes even if it is not near the expected surgical site.  The area will need assessed to determine if surgery should be delayed until it is healed.  The night prior to surgery shower using a fresh bar of anti-bacterial soap (such as Dial) and clean washcloth.  Sleep in a clean bed with clean clothing.  Do not allow pets to sleep with you.  Shower on the morning of surgery using a fresh bar of anti-bacterial soap (such as Dial) and clean washcloth.  Dry with a clean towel and dress in clean clothing.  Ask your surgeon if you will be receiving antibiotics prior to surgery.  Make sure you, your family, and all healthcare providers clean their hands with soap and water or an alcohol based hand  before caring for you or your wound.    Day of surgery:  Your arrival time is approximately two hours before your scheduled surgery time.  Please note if you have an early arrival time the surgery doors do not open before 5:00 AM.  Upon arrival, a Pre-op nurse and Anesthesiologist will review your health history, obtain vital signs, and answer questions you may  have.  The only belongings needed at this time will be a list of your home medications and if applicable your C-PAP/BI-PAP machine.  A Pre-op nurse will start an IV and you may receive medication in preparation for surgery, including something to help you relax.     Please be aware that surgery does come with discomfort.  We want to make every effort to control your discomfort so please discuss any uncontrolled symptoms with your nurse.   Your doctor will most likely have prescribed pain medications.      If you are going home after surgery you will receive individualized written care instructions before being discharged.  A responsible adult must drive you to and from the hospital on the day of your surgery and ideally stay with you through the night.   .  Discharge prescriptions can be filled by the hospital pharmacy during regular pharmacy hours.  If you are having surgery late in the day/evening your prescription may be e-prescribed to your pharmacy.  Please verify your pharmacy hours or chose a 24 hour pharmacy to avoid not having access to your prescription because your pharmacy has closed for the day.    If you are staying overnight following surgery, you will be transported to your hospital room following the recovery period.  [facility].    If you have any questions please call Pre-Admission Testing at [phone].  Deductibles and co-payments are collected on the day of service. Please be prepared to pay the required co-pay, deductible or deposit on the day of service as defined by your plan.    Call your surgeon immediately if you experience any of the following symptoms:  Sore Throat  Shortness of Breath or difficulty breathing  Cough  Chills  Body soreness or muscle pain  Headache  Fever  New loss of taste or smell  Do not arrive for your surgery ill.  Your procedure will need to be rescheduled to another time.  You will need to call your physician before the day  of surgery to avoid any unnecessary exposure to hospital staff as well as other patients.      CHLORHEXIDINE CLOTH INSTRUCTIONS  The morning of surgery follow these instructions using the Chlorhexidine cloths you've been given.  These steps reduce bacteria on the body.  Do not use the cloths near your eyes, ears mouth, genitalia or on open wounds.  Throw the cloths away after use but do not try to flush them down a toilet.      Open and remove one cloth at a time from the package.    Leave the cloth unfolded and begin the bathing.  Massage the skin with the cloths using gentle pressure to remove bacteria.  Do not scrub harshly.   Follow the steps below with one 2% CHG cloth per area (6 total cloths).  One cloth for neck, shoulders and chest.  One cloth for both arms, hands, fingers and underarms (do underarms last).  One cloth for the abdomen followed by groin.  One cloth for right leg and foot including between the toes.  One cloth for left leg and foot including between the toes.  The last cloth is to be used for the back of the neck, back and buttocks.    Allow the CHG to air dry 3 minutes on the skin which will give it time to work and decrease the chance of irritation.  The skin may feel sticky until it is dry.  Do not rinse with water or any other liquid or you will lose the beneficial effects of the CHG.  If mild skin irritation occurs, do rinse the skin to remove the CHG.  Report this to the nurse at time of admission.  Do not apply lotions, creams, ointments, deodorants or perfumes after using the clothes. Dress in clean clothes before coming to the hospital.

## 2025-06-30 ENCOUNTER — TELEPHONE (OUTPATIENT)
Dept: ORTHOPEDIC SURGERY | Facility: HOSPITAL | Age: 75
End: 2025-06-30
Payer: MEDICARE

## 2025-06-30 NOTE — TELEPHONE ENCOUNTER
Risk Factor yes no   Age >75  X   BMI <20 >40  X   Patient History     Chronic Pain (2 or more meds/Pain Management)  X   ETOH (more than 3 drinks Daily)  X   Uncontrolled Depression/Bipolar/Schizoaffective Disorder  X   Arrhythmias--  X   Stent placement/MI  X   DVT/PE  X   Pacemaker  X   HTN (uncontrolled or requiring more than 2 medications) X    CHF/Retained fluids/Edema  X   Stroke with Residual   X   COPD/Asthma  X   MEET--Non-compliant with CPAP  X   Diabetes (on insulin or more than 2 meds)         A1C: X    BPH/Urinary retention (on medication)  X   CKD  X   Home environment and support     Current ambulation status (use of cane, walker, W/C, Multiple falls/weakness)  X   Stairs to enter and throughout home X    Lives Alone  X   Doesn't have support at home  X   Outpatient Screening Assessment    Home needs: (Walker/BSC):  Needs walker  ? Steps 5 steps in   Caregiver 24-48hrs post-discharge:      Discharge Plan:    PT    Prescriptions: Meds to bed     Home medications:   [] Blood thinner/anti-coag therapy--   [] BPH or diuretic--   ? BP meds-- Norvasc, Coreg, Lisinopril   [] Pain/Anti-inflammatories--   DM--Metformin, Actos, Rybelsus     Pre-op Education:  Educate patient on spinal anesthesia/pain control:  ? patient verbalize understanding  Educate patient on hospital course/timeline:  ?  patient verbalize understanding    Joint Care Class:  ?  yes [] no  Notes:

## 2025-07-01 ENCOUNTER — OFFICE VISIT (OUTPATIENT)
Dept: ORTHOPEDIC SURGERY | Facility: CLINIC | Age: 75
End: 2025-07-01
Payer: MEDICARE

## 2025-07-01 VITALS — TEMPERATURE: 97.7 F | HEIGHT: 61 IN | BODY MASS INDEX: 30.49 KG/M2 | WEIGHT: 161.5 LBS

## 2025-07-01 DIAGNOSIS — M16.11 PRIMARY OSTEOARTHRITIS OF RIGHT HIP: Primary | ICD-10-CM

## 2025-07-01 PROCEDURE — 1159F MED LIST DOCD IN RCRD: CPT | Performed by: NURSE PRACTITIONER

## 2025-07-01 PROCEDURE — 1160F RVW MEDS BY RX/DR IN RCRD: CPT | Performed by: NURSE PRACTITIONER

## 2025-07-01 PROCEDURE — S0260 H&P FOR SURGERY: HCPCS | Performed by: NURSE PRACTITIONER

## 2025-07-01 NOTE — H&P (VIEW-ONLY)
Patient: Kimi Dominguez    Date of Admission: 7/7/2025    YOB: 1950    Medical Record Number: [id number]    Admitting Physician: Dr. Derick Bower    Reason for Admission: End Stage Right Hip OA    History of Present Illness: 75 y.o. female presents with severe end stage hip osteoarthritis which has not been responsive to the full compliment of conservative measures. Despite conservative attempts, there is still severe, constant activity limiting hip pain. Given the severity of the pain, the patient has elected to proceed with hip replacement.    Allergies: No Known Allergies      Current Medications:  Home Medications:    Current Outpatient Medications on File Prior to Visit   Medication Sig    acetaminophen (TYLENOL) 650 MG 8 hr tablet Take 1 tablet by mouth Every 8 (Eight) Hours As Needed for Mild Pain.    amLODIPine (NORVASC) 10 MG tablet Take 1 tablet by mouth Daily.    atorvastatin (LIPITOR) 20 MG tablet TAKE 1 TABLET BY MOUTH DAILY (Patient taking differently: Take 1 tablet by mouth Every Night.)    carvedilol (COREG) 3.125 MG tablet TAKE 1 TABLET BY MOUTH TWICE A DAY WITH A MEAL    cyancobalamin (VITAMIN B-12) 100 MCG tablet Take 1 tablet by mouth Daily. TO HOLD 1 WEEK BEFORE SURGERY    ELDERBERRY PO Take 2 tablets by mouth Daily. TO HOLD 1 WEEK BEFORE SURGERY    lisinopril (PRINIVIL,ZESTRIL) 40 MG tablet TAKE 1 TABLET BY MOUTH DAILY    Melatonin 10 MG tablet Take 1 tablet by mouth At Night As Needed.    metFORMIN ER (GLUCOPHAGE-XR) 500 MG 24 hr tablet TAKE 2 TABLETS BY MOUTH TWICE A DAY    Multiple Vitamins-Minerals (MULTIVITAMIN ADULT PO) Take 1 tablet by mouth Daily. TO HOLD 1 WEEK BEFORE SURGERY    Multiple Vitamins-Minerals (OCUVITE ADULT 50+) capsule Take 1 capsule by mouth Daily. TO HOLD 1 WEEK BEFORE SURGERY    pioglitazone (ACTOS) 15 MG tablet TAKE 1 TABLET BY MOUTH EVERY MORNING    Semaglutide (Rybelsus) 7 MG tablet Take 7 mg by mouth Daily.     No current facility-administered medications  on file prior to visit.     PRN Meds:.    PMH:  Past Medical History:   Diagnosis Date    Arthritis     B12 deficiency     Diabetes mellitus     Diverticulitis of colon     Female stress incontinence     Hyperlipidemia     Hypertension     Knee swelling     Loose stools     Obesity     Osteoarthritis     Rectocele 2018    Shingles     left eye    Skin cancer     Tear of meniscus of knee     Urinary incontinence         PSURGH:  Past Surgical History:   Procedure Laterality Date    ADENOIDECTOMY      CATARACT EXTRACTION Bilateral     COLONOSCOPY      COLONOSCOPY N/A 10/04/2018    Procedure: COLONOSCOPY to Cecum WITH BIOPSY;  Surgeon: Geovanna Rebollar MD;  Location: [facility] ENDOSCOPY;  Service: General    KNEE ARTHROSCOPY Left     MOHS SURGERY      NOSE    PAP SMEAR      POSTERIOR VAGINAL REPAIR  2018    for rectocele    PUBOVAGINAL SLING N/A 2021    Procedure: pubovaginal sling cystourethroscopy;  Surgeon: Janneth Matamoros MD;  Location: [facility] MAIN OR;  Service: Gynecology;  Laterality: N/A;    SKIN CANCER DESTRUCTION      basal cells removed on nose bridge, left thigh squamous removed    SKIN GRAFT      BOTH EARS    TONSILLECTOMY      TUBAL ABDOMINAL LIGATION         SocialHx:  Social History     Occupational History    Not on file   Tobacco Use    Smoking status: Former     Current packs/day: 0.00     Types: Cigarettes    Smokeless tobacco: Never   Vaping Use    Vaping status: Never Used   Substance and Sexual Activity    Alcohol use: Not Currently     Comment: RARELY    Drug use: No    Sexual activity: Not Currently     Partners: Male     Birth control/protection: None, Tubal ligation, Birth control pill      Social History     Social History Narrative    Not on file       FamHx:  Family History   Problem Relation Age of Onset    Stroke Mother     Arthritis Mother     Irritable bowel syndrome Mother     Coronary artery disease Father          82    Prostate cancer Father      "Hypertension Father     Cancer Father     No Known Problems Sister     Diabetes Paternal Grandmother         Type 2    Breast cancer Maternal Aunt     Thyroid cancer Neg Hx     Colon cancer Neg Hx     Malig Hyperthermia Neg Hx          Review of Systems:   A 14 point review of systems was performed, pertinent positives discussed above, all other systems are negative    Physical Exam: 75 y.o. female  Vital Signs :   Vitals:    07/01/25 0802   Temp: 97.7 °F (36.5 °C)   Weight: 73.3 kg (161 lb 8 oz)   Height: 154.9 cm (61\")   PainSc: 10-Worst pain ever     General: Alert and Oriented x 3, No acute distress.  Psych: mood and affect appropriate; recent and remote memory intact  Eyes: conjunctivae clear; pupils equally round and reactive, sclerae antiicteric  CV: no peripheral edema  Resp: normal respiratory effort  Skin: no rashes or wounds; normal turgor  Musculosketetal; pain with hip range of motion. Positive Stinchfeld test. No trochanteric tenderness.  Vascular: palpable distal pulses    Labs:    Pre-Admission Testing on 06/18/2025   Component Date Value Ref Range Status    Glucose 06/18/2025 122 (H)  65 - 99 mg/dL Final    BUN 06/18/2025 9.0  8.0 - 23.0 mg/dL Final    Creatinine 06/18/2025 0.51 (L)  0.57 - 1.00 mg/dL Final    Sodium 06/18/2025 139  136 - 145 mmol/L Final    Potassium 06/18/2025 4.1  3.5 - 5.2 mmol/L Final    Chloride 06/18/2025 100  98 - 107 mmol/L Final    CO2 06/18/2025 27.1  22.0 - 29.0 mmol/L Final    Calcium 06/18/2025 9.7  8.6 - 10.5 mg/dL Final    BUN/Creatinine Ratio 06/18/2025 17.6  7.0 - 25.0 Final    Anion Gap 06/18/2025 11.9  5.0 - 15.0 mmol/L Final    eGFR 06/18/2025 98.1  >60.0 mL/min/1.73 Final    WBC 06/18/2025 9.20  3.40 - 10.80 10*3/mm3 Final    RBC 06/18/2025 4.55  3.77 - 5.28 10*6/mm3 Final    Hemoglobin 06/18/2025 12.7  12.0 - 15.9 g/dL Final    Hematocrit 06/18/2025 40.1  34.0 - 46.6 % Final    MCV 06/18/2025 88.1  79.0 - 97.0 fL Final    MCH 06/18/2025 27.9  26.6 - 33.0 pg " Final    MCHC 06/18/2025 31.7  31.5 - 35.7 g/dL Final    RDW 06/18/2025 13.6  12.3 - 15.4 % Final    RDW-SD 06/18/2025 43.6  37.0 - 54.0 fl Final    MPV 06/18/2025 9.0  6.0 - 12.0 fL Final    Platelets 06/18/2025 376  140 - 450 10*3/mm3 Final    QT Interval 06/18/2025 414  ms Final    QTC Interval 06/18/2025 507  ms Final     Xrays:  Xrays AP pelvis and a lateral of the Right hip were reviewed demonstrating  End stage hip OA with bone on bone articulation, subchondral cysts and periarticular osteophytes.    Assessment:  End-stage Right hip osteoarthritis. Conservative measures have failed.      Plan:  The plan is to proceed with Right Total Hip Replacement. The patient voiced understanding of the risks, benefits, and alternative forms of treatment that were discussed at the time of scheduling.  Same day home health, the patient had left bundle branch block on EKG, unchanged from 2021, patient asymptomatic, reviewed with Dr. Bower and he is okay to proceed with surgery.    Julienne Arias, APRN  7/1/2025

## 2025-07-01 NOTE — H&P
Patient: Kimi Dominguez    Date of Admission: 7/7/2025    YOB: 1950    Medical Record Number: [id number]    Admitting Physician: Dr. Derick Bower    Reason for Admission: End Stage Right Hip OA    History of Present Illness: 75 y.o. female presents with severe end stage hip osteoarthritis which has not been responsive to the full compliment of conservative measures. Despite conservative attempts, there is still severe, constant activity limiting hip pain. Given the severity of the pain, the patient has elected to proceed with hip replacement.    Allergies: No Known Allergies      Current Medications:  Home Medications:    Current Outpatient Medications on File Prior to Visit   Medication Sig    acetaminophen (TYLENOL) 650 MG 8 hr tablet Take 1 tablet by mouth Every 8 (Eight) Hours As Needed for Mild Pain.    amLODIPine (NORVASC) 10 MG tablet Take 1 tablet by mouth Daily.    atorvastatin (LIPITOR) 20 MG tablet TAKE 1 TABLET BY MOUTH DAILY (Patient taking differently: Take 1 tablet by mouth Every Night.)    carvedilol (COREG) 3.125 MG tablet TAKE 1 TABLET BY MOUTH TWICE A DAY WITH A MEAL    cyancobalamin (VITAMIN B-12) 100 MCG tablet Take 1 tablet by mouth Daily. TO HOLD 1 WEEK BEFORE SURGERY    ELDERBERRY PO Take 2 tablets by mouth Daily. TO HOLD 1 WEEK BEFORE SURGERY    lisinopril (PRINIVIL,ZESTRIL) 40 MG tablet TAKE 1 TABLET BY MOUTH DAILY    Melatonin 10 MG tablet Take 1 tablet by mouth At Night As Needed.    metFORMIN ER (GLUCOPHAGE-XR) 500 MG 24 hr tablet TAKE 2 TABLETS BY MOUTH TWICE A DAY    Multiple Vitamins-Minerals (MULTIVITAMIN ADULT PO) Take 1 tablet by mouth Daily. TO HOLD 1 WEEK BEFORE SURGERY    Multiple Vitamins-Minerals (OCUVITE ADULT 50+) capsule Take 1 capsule by mouth Daily. TO HOLD 1 WEEK BEFORE SURGERY    pioglitazone (ACTOS) 15 MG tablet TAKE 1 TABLET BY MOUTH EVERY MORNING    Semaglutide (Rybelsus) 7 MG tablet Take 7 mg by mouth Daily.     No current facility-administered medications  on file prior to visit.     PRN Meds:.    PMH:  Past Medical History:   Diagnosis Date    Arthritis     B12 deficiency     Diabetes mellitus     Diverticulitis of colon     Female stress incontinence     Hyperlipidemia     Hypertension     Knee swelling     Loose stools     Obesity     Osteoarthritis     Rectocele 2018    Shingles     left eye    Skin cancer     Tear of meniscus of knee     Urinary incontinence         PSURGH:  Past Surgical History:   Procedure Laterality Date    ADENOIDECTOMY      CATARACT EXTRACTION Bilateral     COLONOSCOPY      COLONOSCOPY N/A 10/04/2018    Procedure: COLONOSCOPY to Cecum WITH BIOPSY;  Surgeon: Geovanna Rebollar MD;  Location: [facility] ENDOSCOPY;  Service: General    KNEE ARTHROSCOPY Left     MOHS SURGERY      NOSE    PAP SMEAR      POSTERIOR VAGINAL REPAIR  2018    for rectocele    PUBOVAGINAL SLING N/A 2021    Procedure: pubovaginal sling cystourethroscopy;  Surgeon: Janneth Matamoros MD;  Location: [facility] MAIN OR;  Service: Gynecology;  Laterality: N/A;    SKIN CANCER DESTRUCTION      basal cells removed on nose bridge, left thigh squamous removed    SKIN GRAFT      BOTH EARS    TONSILLECTOMY      TUBAL ABDOMINAL LIGATION         SocialHx:  Social History     Occupational History    Not on file   Tobacco Use    Smoking status: Former     Current packs/day: 0.00     Types: Cigarettes    Smokeless tobacco: Never   Vaping Use    Vaping status: Never Used   Substance and Sexual Activity    Alcohol use: Not Currently     Comment: RARELY    Drug use: No    Sexual activity: Not Currently     Partners: Male     Birth control/protection: None, Tubal ligation, Birth control pill      Social History     Social History Narrative    Not on file       FamHx:  Family History   Problem Relation Age of Onset    Stroke Mother     Arthritis Mother     Irritable bowel syndrome Mother     Coronary artery disease Father          82    Prostate cancer Father      "Hypertension Father     Cancer Father     No Known Problems Sister     Diabetes Paternal Grandmother         Type 2    Breast cancer Maternal Aunt     Thyroid cancer Neg Hx     Colon cancer Neg Hx     Malig Hyperthermia Neg Hx          Review of Systems:   A 14 point review of systems was performed, pertinent positives discussed above, all other systems are negative    Physical Exam: 75 y.o. female  Vital Signs :   Vitals:    07/01/25 0802   Temp: 97.7 °F (36.5 °C)   Weight: 73.3 kg (161 lb 8 oz)   Height: 154.9 cm (61\")   PainSc: 10-Worst pain ever     General: Alert and Oriented x 3, No acute distress.  Psych: mood and affect appropriate; recent and remote memory intact  Eyes: conjunctivae clear; pupils equally round and reactive, sclerae antiicteric  CV: no peripheral edema  Resp: normal respiratory effort  Skin: no rashes or wounds; normal turgor  Musculosketetal; pain with hip range of motion. Positive Stinchfeld test. No trochanteric tenderness.  Vascular: palpable distal pulses    Labs:    Pre-Admission Testing on 06/18/2025   Component Date Value Ref Range Status    Glucose 06/18/2025 122 (H)  65 - 99 mg/dL Final    BUN 06/18/2025 9.0  8.0 - 23.0 mg/dL Final    Creatinine 06/18/2025 0.51 (L)  0.57 - 1.00 mg/dL Final    Sodium 06/18/2025 139  136 - 145 mmol/L Final    Potassium 06/18/2025 4.1  3.5 - 5.2 mmol/L Final    Chloride 06/18/2025 100  98 - 107 mmol/L Final    CO2 06/18/2025 27.1  22.0 - 29.0 mmol/L Final    Calcium 06/18/2025 9.7  8.6 - 10.5 mg/dL Final    BUN/Creatinine Ratio 06/18/2025 17.6  7.0 - 25.0 Final    Anion Gap 06/18/2025 11.9  5.0 - 15.0 mmol/L Final    eGFR 06/18/2025 98.1  >60.0 mL/min/1.73 Final    WBC 06/18/2025 9.20  3.40 - 10.80 10*3/mm3 Final    RBC 06/18/2025 4.55  3.77 - 5.28 10*6/mm3 Final    Hemoglobin 06/18/2025 12.7  12.0 - 15.9 g/dL Final    Hematocrit 06/18/2025 40.1  34.0 - 46.6 % Final    MCV 06/18/2025 88.1  79.0 - 97.0 fL Final    MCH 06/18/2025 27.9  26.6 - 33.0 pg " Final    MCHC 06/18/2025 31.7  31.5 - 35.7 g/dL Final    RDW 06/18/2025 13.6  12.3 - 15.4 % Final    RDW-SD 06/18/2025 43.6  37.0 - 54.0 fl Final    MPV 06/18/2025 9.0  6.0 - 12.0 fL Final    Platelets 06/18/2025 376  140 - 450 10*3/mm3 Final    QT Interval 06/18/2025 414  ms Final    QTC Interval 06/18/2025 507  ms Final     Xrays:  Xrays AP pelvis and a lateral of the Right hip were reviewed demonstrating  End stage hip OA with bone on bone articulation, subchondral cysts and periarticular osteophytes.    Assessment:  End-stage Right hip osteoarthritis. Conservative measures have failed.      Plan:  The plan is to proceed with Right Total Hip Replacement. The patient voiced understanding of the risks, benefits, and alternative forms of treatment that were discussed at the time of scheduling.  Same day home health, the patient had left bundle branch block on EKG, unchanged from 2021, patient asymptomatic, reviewed with Dr. Bower and he is okay to proceed with surgery.    Julienne Arias, APRN  7/1/2025

## 2025-07-03 ENCOUNTER — TELEPHONE (OUTPATIENT)
Dept: ORTHOPEDIC SURGERY | Facility: CLINIC | Age: 75
End: 2025-07-03
Payer: MEDICARE

## 2025-07-03 NOTE — DISCHARGE PLACEMENT REQUEST
"Kiel Bess (75 y.o. Female)       Date of Birth   1950    Social Security Number       Address   [address] [place], [place] [address]    Home Phone   [phone]    MRN   [id number]       Sabianist   Restorationism    Marital Status                               Admission Date       Admission Type   Elective    Admitting Provider   Derick Bower MD    Attending Provider   Derick Bower MD    Department, Room/Bed   [facility] OR, --/--       Discharge Date       Discharge Disposition       Discharge Destination                                 Attending Provider: Derick Bower MD    Allergies: No Known Allergies    Isolation: None   Infection: None   Code Status: Not on file    Ht: 154.9 cm (61\")   Wt: 73.3 kg (161 lb 8 oz)    Admission Cmt: None   Principal Problem: Primary osteoarthritis of right hip [M16.11]                   Active Insurance as of 7/7/2025       Primary Coverage       Payor Plan Insurance Group Employer/Plan Group    MEDICARE MEDICARE A & B        Payor Plan Address Payor Plan Phone Number Payor Plan Fax Number Effective Dates    [address] [phone]  5/1/2016 - None Entered    [facility] [address]         Subscriber Name Subscriber Birth Date Member ID       KIEL BESS 1950 [id number]               Secondary Coverage       Payor Plan Insurance Group Employer/Plan Group    [facility] SUPP [id number]       Payor Plan Address Payor Plan Phone Number Payor Plan Fax Number Effective Dates    [address]   12/1/2019 - None Entered    [facility] [address]         Subscriber Name Subscriber Birth Date Member ID       KIEL BESS 1950 [id number]                     Emergency Contacts        (Rel.) Home Phone Work Phone Mobile Phone    Avtar Bess (Spouse) [phone] -- [phone]    Anai Mcneill (Daughter) [phone] -- --                "

## 2025-07-07 ENCOUNTER — ANESTHESIA EVENT (OUTPATIENT)
Dept: PERIOP | Facility: HOSPITAL | Age: 75
End: 2025-07-07
Payer: MEDICARE

## 2025-07-07 ENCOUNTER — HOSPITAL ENCOUNTER (OUTPATIENT)
Facility: HOSPITAL | Age: 75
Setting detail: HOSPITAL OUTPATIENT SURGERY
Discharge: HOME HEALTH | End: 2025-07-07
Attending: ORTHOPAEDIC SURGERY | Admitting: ORTHOPAEDIC SURGERY
Payer: MEDICARE

## 2025-07-07 ENCOUNTER — APPOINTMENT (OUTPATIENT)
Dept: GENERAL RADIOLOGY | Facility: HOSPITAL | Age: 75
End: 2025-07-07
Payer: MEDICARE

## 2025-07-07 ENCOUNTER — ANESTHESIA (OUTPATIENT)
Dept: PERIOP | Facility: HOSPITAL | Age: 75
End: 2025-07-07
Payer: MEDICARE

## 2025-07-07 VITALS
SYSTOLIC BLOOD PRESSURE: 132 MMHG | OXYGEN SATURATION: 100 % | TEMPERATURE: 97.7 F | RESPIRATION RATE: 14 BRPM | DIASTOLIC BLOOD PRESSURE: 68 MMHG | HEART RATE: 82 BPM

## 2025-07-07 DIAGNOSIS — Z96.641 STATUS POST TOTAL HIP REPLACEMENT, RIGHT: Primary | ICD-10-CM

## 2025-07-07 DIAGNOSIS — M16.11 PRIMARY OSTEOARTHRITIS OF RIGHT HIP: ICD-10-CM

## 2025-07-07 LAB
ABO GROUP BLD: NORMAL
BLD GP AB SCN SERPL QL: NEGATIVE
GLUCOSE BLDC GLUCOMTR-MCNC: 150 MG/DL (ref 70–130)
GLUCOSE BLDC GLUCOMTR-MCNC: 167 MG/DL (ref 70–130)
RH BLD: POSITIVE
T&S EXPIRATION DATE: NORMAL

## 2025-07-07 PROCEDURE — 25010000002 VANCOMYCIN 1 G RECONSTITUTED SOLUTION 1 EACH VIAL: Performed by: NURSE PRACTITIONER

## 2025-07-07 PROCEDURE — 27130 TOTAL HIP ARTHROPLASTY: CPT | Performed by: ORTHOPAEDIC SURGERY

## 2025-07-07 PROCEDURE — 25810000003 LACTATED RINGERS SOLUTION: Performed by: NURSE PRACTITIONER

## 2025-07-07 PROCEDURE — 97162 PT EVAL MOD COMPLEX 30 MIN: CPT

## 2025-07-07 PROCEDURE — 82948 REAGENT STRIP/BLOOD GLUCOSE: CPT

## 2025-07-07 PROCEDURE — 25010000002 MORPHINE PER 10 MG: Performed by: ORTHOPAEDIC SURGERY

## 2025-07-07 PROCEDURE — 25010000002 KETOROLAC TROMETHAMINE PER 15 MG: Performed by: ORTHOPAEDIC SURGERY

## 2025-07-07 PROCEDURE — 25010000002 PROPOFOL 10 MG/ML EMULSION: Performed by: NURSE ANESTHETIST, CERTIFIED REGISTERED

## 2025-07-07 PROCEDURE — 25010000002 SUGAMMADEX 200 MG/2ML SOLUTION: Performed by: NURSE ANESTHETIST, CERTIFIED REGISTERED

## 2025-07-07 PROCEDURE — 25010000002 ROPIVACAINE PER 1 MG: Performed by: ORTHOPAEDIC SURGERY

## 2025-07-07 PROCEDURE — C1776 JOINT DEVICE (IMPLANTABLE): HCPCS | Performed by: ORTHOPAEDIC SURGERY

## 2025-07-07 PROCEDURE — 86901 BLOOD TYPING SEROLOGIC RH(D): CPT | Performed by: NURSE PRACTITIONER

## 2025-07-07 PROCEDURE — 86900 BLOOD TYPING SEROLOGIC ABO: CPT | Performed by: NURSE PRACTITIONER

## 2025-07-07 PROCEDURE — 25810000003 SODIUM CHLORIDE 0.9 % SOLUTION 250 ML FLEX CONT: Performed by: NURSE PRACTITIONER

## 2025-07-07 PROCEDURE — 25010000002 ACETAMINOPHEN 10 MG/ML SOLUTION: Performed by: NURSE ANESTHETIST, CERTIFIED REGISTERED

## 2025-07-07 PROCEDURE — 27130 TOTAL HIP ARTHROPLASTY: CPT | Performed by: NURSE PRACTITIONER

## 2025-07-07 PROCEDURE — 97110 THERAPEUTIC EXERCISES: CPT

## 2025-07-07 PROCEDURE — 25010000002 MIDAZOLAM PER 1 MG: Performed by: ANESTHESIOLOGY

## 2025-07-07 PROCEDURE — 73501 X-RAY EXAM HIP UNI 1 VIEW: CPT

## 2025-07-07 PROCEDURE — 76000 FLUOROSCOPY <1 HR PHYS/QHP: CPT

## 2025-07-07 PROCEDURE — 25010000002 PROPOFOL 200 MG/20ML EMULSION: Performed by: NURSE ANESTHETIST, CERTIFIED REGISTERED

## 2025-07-07 PROCEDURE — 25010000002 FAMOTIDINE 10 MG/ML SOLUTION: Performed by: ANESTHESIOLOGY

## 2025-07-07 PROCEDURE — 25810000003 LACTATED RINGERS PER 1000 ML: Performed by: ANESTHESIOLOGY

## 2025-07-07 PROCEDURE — 25010000002 MAGNESIUM SULFATE PER 500 MG OF MAGNESIUM: Performed by: NURSE ANESTHETIST, CERTIFIED REGISTERED

## 2025-07-07 PROCEDURE — 25010000002 FENTANYL CITRATE (PF) 50 MCG/ML SOLUTION: Performed by: NURSE ANESTHETIST, CERTIFIED REGISTERED

## 2025-07-07 PROCEDURE — 25010000002 ONDANSETRON PER 1 MG: Performed by: NURSE ANESTHETIST, CERTIFIED REGISTERED

## 2025-07-07 PROCEDURE — 25010000002 LABETALOL 5 MG/ML SOLUTION: Performed by: NURSE ANESTHETIST, CERTIFIED REGISTERED

## 2025-07-07 PROCEDURE — 25010000002 DEXAMETHASONE SODIUM PHOSPHATE 20 MG/5ML SOLUTION: Performed by: NURSE ANESTHETIST, CERTIFIED REGISTERED

## 2025-07-07 PROCEDURE — 25010000002 LIDOCAINE 2% SOLUTION: Performed by: NURSE ANESTHETIST, CERTIFIED REGISTERED

## 2025-07-07 PROCEDURE — 86850 RBC ANTIBODY SCREEN: CPT | Performed by: NURSE PRACTITIONER

## 2025-07-07 PROCEDURE — 25010000002 EPINEPHRINE 1 MG/ML SOLUTION 30 ML VIAL: Performed by: ORTHOPAEDIC SURGERY

## 2025-07-07 PROCEDURE — C1713 ANCHOR/SCREW BN/BN,TIS/BN: HCPCS | Performed by: ORTHOPAEDIC SURGERY

## 2025-07-07 PROCEDURE — 25010000002 CEFAZOLIN PER 500 MG: Performed by: NURSE PRACTITIONER

## 2025-07-07 DEVICE — KNOTLESS TISSUE CONTROL DEVICE, UNDYED UNIDIRECTIONAL (ANTIBACTERIAL) SYNTHETIC ABSORBABLE DEVICE
Type: IMPLANTABLE DEVICE | Site: HIP | Status: FUNCTIONAL
Brand: STRATAFIX

## 2025-07-07 DEVICE — KNOTLESS TISSUE CONTROL DEVICE, VIOLET UNIDIRECTIONAL (ANTIBACTERIAL) SYNTHETIC ABSORBABLE DEVICE
Type: IMPLANTABLE DEVICE | Site: HIP | Status: FUNCTIONAL
Brand: STRATAFIX

## 2025-07-07 DEVICE — IMPLANTABLE DEVICE: Type: IMPLANTABLE DEVICE | Status: FUNCTIONAL

## 2025-07-07 DEVICE — R3 3 HOLE ACETABULAR SHELL 50MM
Type: IMPLANTABLE DEVICE | Site: HIP | Status: FUNCTIONAL
Brand: R3 ACETABULAR

## 2025-07-07 DEVICE — OXINIUM FEMORAL HEAD 12/14 TAPER                                    36 MM +0
Type: IMPLANTABLE DEVICE | Site: HIP | Status: FUNCTIONAL
Brand: OXINIUM

## 2025-07-07 DEVICE — REFLECTION SPHERICAL HEAD SCREW 20MM
Type: IMPLANTABLE DEVICE | Site: HIP | Status: FUNCTIONAL
Brand: REFLECTION

## 2025-07-07 DEVICE — POLARSTEM COLLAR STANDARD                                    NON-CEMENTED WITH TI/HA 1
Type: IMPLANTABLE DEVICE | Site: HIP | Status: FUNCTIONAL
Brand: POLARSTEM

## 2025-07-07 DEVICE — R3 0 DEG XLPE ACET LNR 36MM ID X                                    OD 50MM
Type: IMPLANTABLE DEVICE | Site: HIP | Status: FUNCTIONAL
Brand: R3

## 2025-07-07 RX ORDER — NALOXONE HCL 0.4 MG/ML
0.2 VIAL (ML) INJECTION AS NEEDED
Status: DISCONTINUED | OUTPATIENT
Start: 2025-07-07 | End: 2025-07-07 | Stop reason: HOSPADM

## 2025-07-07 RX ORDER — ASPIRIN 81 MG/1
81 TABLET ORAL EVERY 12 HOURS SCHEDULED
Status: CANCELLED | OUTPATIENT
Start: 2025-07-08

## 2025-07-07 RX ORDER — HYDROCODONE BITARTRATE AND ACETAMINOPHEN 7.5; 325 MG/1; MG/1
1 TABLET ORAL EVERY 4 HOURS PRN
Qty: 40 TABLET | Refills: 0 | Status: SHIPPED | OUTPATIENT
Start: 2025-07-07 | End: 2025-07-10 | Stop reason: SDUPTHER

## 2025-07-07 RX ORDER — SODIUM CHLORIDE 0.9 % (FLUSH) 0.9 %
3 SYRINGE (ML) INJECTION EVERY 12 HOURS SCHEDULED
Status: DISCONTINUED | OUTPATIENT
Start: 2025-07-07 | End: 2025-07-07 | Stop reason: HOSPADM

## 2025-07-07 RX ORDER — SODIUM CHLORIDE, SODIUM LACTATE, POTASSIUM CHLORIDE, CALCIUM CHLORIDE 600; 310; 30; 20 MG/100ML; MG/100ML; MG/100ML; MG/100ML
9 INJECTION, SOLUTION INTRAVENOUS CONTINUOUS
Status: DISCONTINUED | OUTPATIENT
Start: 2025-07-07 | End: 2025-07-07 | Stop reason: HOSPADM

## 2025-07-07 RX ORDER — DROPERIDOL 2.5 MG/ML
0.62 INJECTION, SOLUTION INTRAMUSCULAR; INTRAVENOUS
Status: DISCONTINUED | OUTPATIENT
Start: 2025-07-07 | End: 2025-07-07 | Stop reason: HOSPADM

## 2025-07-07 RX ORDER — MELOXICAM 15 MG/1
15 TABLET ORAL ONCE
Status: COMPLETED | OUTPATIENT
Start: 2025-07-07 | End: 2025-07-07

## 2025-07-07 RX ORDER — LABETALOL HYDROCHLORIDE 5 MG/ML
INJECTION, SOLUTION INTRAVENOUS AS NEEDED
Status: DISCONTINUED | OUTPATIENT
Start: 2025-07-07 | End: 2025-07-07 | Stop reason: SURG

## 2025-07-07 RX ORDER — FENTANYL CITRATE 50 UG/ML
50 INJECTION, SOLUTION INTRAMUSCULAR; INTRAVENOUS
Status: DISCONTINUED | OUTPATIENT
Start: 2025-07-07 | End: 2025-07-07 | Stop reason: HOSPADM

## 2025-07-07 RX ORDER — POLYETHYLENE GLYCOL 3350 17 G/17G
17 POWDER, FOR SOLUTION ORAL 2 TIMES DAILY
Qty: 238 G | Refills: 0 | Status: SHIPPED | OUTPATIENT
Start: 2025-07-07 | End: 2025-07-14

## 2025-07-07 RX ORDER — ONDANSETRON 2 MG/ML
4 INJECTION INTRAMUSCULAR; INTRAVENOUS ONCE AS NEEDED
Status: DISCONTINUED | OUTPATIENT
Start: 2025-07-07 | End: 2025-07-07 | Stop reason: HOSPADM

## 2025-07-07 RX ORDER — ACETAMINOPHEN 325 MG/1
650 TABLET ORAL EVERY 6 HOURS PRN
Status: DISCONTINUED | OUTPATIENT
Start: 2025-07-07 | End: 2025-07-07 | Stop reason: HOSPADM

## 2025-07-07 RX ORDER — PROMETHAZINE HYDROCHLORIDE 25 MG/1
12.5 TABLET ORAL EVERY 4 HOURS PRN
Status: DISCONTINUED | OUTPATIENT
Start: 2025-07-07 | End: 2025-07-07 | Stop reason: HOSPADM

## 2025-07-07 RX ORDER — ATROPINE SULFATE 0.4 MG/ML
0.4 INJECTION, SOLUTION INTRAMUSCULAR; INTRAVENOUS; SUBCUTANEOUS ONCE AS NEEDED
Status: DISCONTINUED | OUTPATIENT
Start: 2025-07-07 | End: 2025-07-07 | Stop reason: HOSPADM

## 2025-07-07 RX ORDER — ASPIRIN 81 MG/1
TABLET ORAL
Qty: 60 TABLET | Refills: 0 | Status: SHIPPED | OUTPATIENT
Start: 2025-07-07 | End: 2025-08-18

## 2025-07-07 RX ORDER — PANTOPRAZOLE SODIUM 40 MG/1
40 TABLET, DELAYED RELEASE ORAL DAILY
Qty: 14 TABLET | Refills: 0 | Status: SHIPPED | OUTPATIENT
Start: 2025-07-07 | End: 2025-07-21

## 2025-07-07 RX ORDER — FENTANYL CITRATE 50 UG/ML
INJECTION, SOLUTION INTRAMUSCULAR; INTRAVENOUS AS NEEDED
Status: DISCONTINUED | OUTPATIENT
Start: 2025-07-07 | End: 2025-07-07 | Stop reason: SURG

## 2025-07-07 RX ORDER — PROMETHAZINE HYDROCHLORIDE 25 MG/1
25 SUPPOSITORY RECTAL ONCE AS NEEDED
Status: DISCONTINUED | OUTPATIENT
Start: 2025-07-07 | End: 2025-07-07 | Stop reason: HOSPADM

## 2025-07-07 RX ORDER — ONDANSETRON 4 MG/1
4 TABLET, FILM COATED ORAL EVERY 8 HOURS PRN
Qty: 10 TABLET | Refills: 0 | Status: SHIPPED | OUTPATIENT
Start: 2025-07-07

## 2025-07-07 RX ORDER — HYDROCODONE BITARTRATE AND ACETAMINOPHEN 7.5; 325 MG/1; MG/1
2 TABLET ORAL EVERY 4 HOURS PRN
Refills: 0 | Status: CANCELLED | OUTPATIENT
Start: 2025-07-07 | End: 2025-07-14

## 2025-07-07 RX ORDER — HYDRALAZINE HYDROCHLORIDE 20 MG/ML
5 INJECTION INTRAMUSCULAR; INTRAVENOUS
Status: DISCONTINUED | OUTPATIENT
Start: 2025-07-07 | End: 2025-07-07 | Stop reason: HOSPADM

## 2025-07-07 RX ORDER — ONDANSETRON 4 MG/1
4 TABLET, ORALLY DISINTEGRATING ORAL EVERY 6 HOURS PRN
Status: DISCONTINUED | OUTPATIENT
Start: 2025-07-07 | End: 2025-07-07 | Stop reason: HOSPADM

## 2025-07-07 RX ORDER — MAGNESIUM SULFATE HEPTAHYDRATE 500 MG/ML
INJECTION, SOLUTION INTRAMUSCULAR; INTRAVENOUS AS NEEDED
Status: DISCONTINUED | OUTPATIENT
Start: 2025-07-07 | End: 2025-07-07 | Stop reason: SURG

## 2025-07-07 RX ORDER — HYDROCODONE BITARTRATE AND ACETAMINOPHEN 5; 325 MG/1; MG/1
1 TABLET ORAL ONCE AS NEEDED
Status: COMPLETED | OUTPATIENT
Start: 2025-07-07 | End: 2025-07-07

## 2025-07-07 RX ORDER — LABETALOL HYDROCHLORIDE 5 MG/ML
5 INJECTION, SOLUTION INTRAVENOUS
Status: DISCONTINUED | OUTPATIENT
Start: 2025-07-07 | End: 2025-07-07 | Stop reason: HOSPADM

## 2025-07-07 RX ORDER — HYDROCODONE BITARTRATE AND ACETAMINOPHEN 7.5; 325 MG/1; MG/1
1 TABLET ORAL EVERY 4 HOURS PRN
Refills: 0 | Status: CANCELLED | OUTPATIENT
Start: 2025-07-07 | End: 2025-07-14

## 2025-07-07 RX ORDER — OXYCODONE AND ACETAMINOPHEN 7.5; 325 MG/1; MG/1
1 TABLET ORAL EVERY 4 HOURS PRN
Status: DISCONTINUED | OUTPATIENT
Start: 2025-07-07 | End: 2025-07-07 | Stop reason: HOSPADM

## 2025-07-07 RX ORDER — MIDAZOLAM HYDROCHLORIDE 1 MG/ML
0.5 INJECTION, SOLUTION INTRAMUSCULAR; INTRAVENOUS
Status: DISCONTINUED | OUTPATIENT
Start: 2025-07-07 | End: 2025-07-07 | Stop reason: HOSPADM

## 2025-07-07 RX ORDER — LIDOCAINE HYDROCHLORIDE 20 MG/ML
INJECTION, SOLUTION INFILTRATION; PERINEURAL AS NEEDED
Status: DISCONTINUED | OUTPATIENT
Start: 2025-07-07 | End: 2025-07-07 | Stop reason: SURG

## 2025-07-07 RX ORDER — DEXAMETHASONE SODIUM PHOSPHATE 4 MG/ML
INJECTION, SOLUTION INTRA-ARTICULAR; INTRALESIONAL; INTRAMUSCULAR; INTRAVENOUS; SOFT TISSUE AS NEEDED
Status: DISCONTINUED | OUTPATIENT
Start: 2025-07-07 | End: 2025-07-07 | Stop reason: SURG

## 2025-07-07 RX ORDER — IPRATROPIUM BROMIDE AND ALBUTEROL SULFATE 2.5; .5 MG/3ML; MG/3ML
3 SOLUTION RESPIRATORY (INHALATION) ONCE AS NEEDED
Status: DISCONTINUED | OUTPATIENT
Start: 2025-07-07 | End: 2025-07-07 | Stop reason: HOSPADM

## 2025-07-07 RX ORDER — ACETAMINOPHEN 10 MG/ML
INJECTION, SOLUTION INTRAVENOUS AS NEEDED
Status: DISCONTINUED | OUTPATIENT
Start: 2025-07-07 | End: 2025-07-07 | Stop reason: SURG

## 2025-07-07 RX ORDER — PROMETHAZINE HYDROCHLORIDE 25 MG/1
25 TABLET ORAL ONCE AS NEEDED
Status: DISCONTINUED | OUTPATIENT
Start: 2025-07-07 | End: 2025-07-07 | Stop reason: HOSPADM

## 2025-07-07 RX ORDER — PROPOFOL 10 MG/ML
INJECTION, EMULSION INTRAVENOUS AS NEEDED
Status: DISCONTINUED | OUTPATIENT
Start: 2025-07-07 | End: 2025-07-07 | Stop reason: SURG

## 2025-07-07 RX ORDER — SODIUM CHLORIDE 0.9 % (FLUSH) 0.9 %
3-10 SYRINGE (ML) INJECTION AS NEEDED
Status: DISCONTINUED | OUTPATIENT
Start: 2025-07-07 | End: 2025-07-07 | Stop reason: HOSPADM

## 2025-07-07 RX ORDER — ROCURONIUM BROMIDE 10 MG/ML
INJECTION, SOLUTION INTRAVENOUS AS NEEDED
Status: DISCONTINUED | OUTPATIENT
Start: 2025-07-07 | End: 2025-07-07 | Stop reason: SURG

## 2025-07-07 RX ORDER — MELOXICAM 15 MG/1
15 TABLET ORAL DAILY
Qty: 14 TABLET | Refills: 0 | Status: SHIPPED | OUTPATIENT
Start: 2025-07-07

## 2025-07-07 RX ORDER — EPHEDRINE SULFATE 50 MG/ML
5 INJECTION, SOLUTION INTRAVENOUS ONCE AS NEEDED
Status: COMPLETED | OUTPATIENT
Start: 2025-07-07 | End: 2025-07-07

## 2025-07-07 RX ORDER — FLUMAZENIL 0.1 MG/ML
0.2 INJECTION INTRAVENOUS AS NEEDED
Status: DISCONTINUED | OUTPATIENT
Start: 2025-07-07 | End: 2025-07-07 | Stop reason: HOSPADM

## 2025-07-07 RX ORDER — TRANEXAMIC ACID 100 MG/ML
INJECTION, SOLUTION INTRAVENOUS AS NEEDED
Status: DISCONTINUED | OUTPATIENT
Start: 2025-07-07 | End: 2025-07-07 | Stop reason: SURG

## 2025-07-07 RX ORDER — ONDANSETRON 2 MG/ML
INJECTION INTRAMUSCULAR; INTRAVENOUS AS NEEDED
Status: DISCONTINUED | OUTPATIENT
Start: 2025-07-07 | End: 2025-07-07 | Stop reason: SURG

## 2025-07-07 RX ORDER — FAMOTIDINE 10 MG/ML
20 INJECTION, SOLUTION INTRAVENOUS ONCE
Status: COMPLETED | OUTPATIENT
Start: 2025-07-07 | End: 2025-07-07

## 2025-07-07 RX ORDER — MAGNESIUM HYDROXIDE 1200 MG/15ML
LIQUID ORAL AS NEEDED
Status: DISCONTINUED | OUTPATIENT
Start: 2025-07-07 | End: 2025-07-07 | Stop reason: HOSPADM

## 2025-07-07 RX ORDER — HYDROCODONE BITARTRATE AND ACETAMINOPHEN 7.5; 325 MG/1; MG/1
1 TABLET ORAL EVERY 4 HOURS PRN
Status: DISCONTINUED | OUTPATIENT
Start: 2025-07-07 | End: 2025-07-07 | Stop reason: HOSPADM

## 2025-07-07 RX ORDER — PREGABALIN 75 MG/1
150 CAPSULE ORAL ONCE
Status: COMPLETED | OUTPATIENT
Start: 2025-07-07 | End: 2025-07-07

## 2025-07-07 RX ORDER — DIPHENHYDRAMINE HYDROCHLORIDE 50 MG/ML
12.5 INJECTION, SOLUTION INTRAMUSCULAR; INTRAVENOUS
Status: DISCONTINUED | OUTPATIENT
Start: 2025-07-07 | End: 2025-07-07 | Stop reason: HOSPADM

## 2025-07-07 RX ORDER — HYDROMORPHONE HYDROCHLORIDE 1 MG/ML
0.5 INJECTION, SOLUTION INTRAMUSCULAR; INTRAVENOUS; SUBCUTANEOUS
Status: DISCONTINUED | OUTPATIENT
Start: 2025-07-07 | End: 2025-07-07 | Stop reason: HOSPADM

## 2025-07-07 RX ADMIN — ONDANSETRON 4 MG: 2 INJECTION, SOLUTION INTRAMUSCULAR; INTRAVENOUS at 14:40

## 2025-07-07 RX ADMIN — DEXAMETHASONE SODIUM PHOSPHATE 4 MG: 4 INJECTION, SOLUTION INTRAMUSCULAR; INTRAVENOUS at 13:30

## 2025-07-07 RX ADMIN — EPHEDRINE SULFATE 10 MG: 50 INJECTION INTRAVENOUS at 15:19

## 2025-07-07 RX ADMIN — TRANEXAMIC ACID 1000 MG: 100 INJECTION INTRAVENOUS at 13:30

## 2025-07-07 RX ADMIN — CEFAZOLIN 2 G: 2 INJECTION, POWDER, FOR SOLUTION INTRAMUSCULAR; INTRAVENOUS at 13:01

## 2025-07-07 RX ADMIN — PREGABALIN 75 MG: 75 CAPSULE ORAL at 11:06

## 2025-07-07 RX ADMIN — LABETALOL HYDROCHLORIDE 5 MG: 5 INJECTION, SOLUTION INTRAVENOUS at 14:07

## 2025-07-07 RX ADMIN — LIDOCAINE HYDROCHLORIDE 100 MG: 20 INJECTION, SOLUTION INFILTRATION; PERINEURAL at 13:14

## 2025-07-07 RX ADMIN — SODIUM CHLORIDE 1000 MG: 9 INJECTION, SOLUTION INTRAVENOUS at 11:52

## 2025-07-07 RX ADMIN — HYDROCODONE BITARTRATE AND ACETAMINOPHEN 1 TABLET: 5; 325 TABLET ORAL at 16:04

## 2025-07-07 RX ADMIN — FENTANYL CITRATE 50 MCG: 50 INJECTION, SOLUTION INTRAMUSCULAR; INTRAVENOUS at 13:14

## 2025-07-07 RX ADMIN — LABETALOL HYDROCHLORIDE 5 MG: 5 INJECTION, SOLUTION INTRAVENOUS at 14:24

## 2025-07-07 RX ADMIN — FENTANYL CITRATE 50 MCG: 50 INJECTION, SOLUTION INTRAMUSCULAR; INTRAVENOUS at 13:44

## 2025-07-07 RX ADMIN — ROCURONIUM BROMIDE 50 MG: 10 INJECTION, SOLUTION INTRAVENOUS at 13:14

## 2025-07-07 RX ADMIN — FAMOTIDINE 20 MG: 10 INJECTION INTRAVENOUS at 11:40

## 2025-07-07 RX ADMIN — LABETALOL HYDROCHLORIDE 5 MG: 5 INJECTION, SOLUTION INTRAVENOUS at 14:00

## 2025-07-07 RX ADMIN — PROPOFOL 150 MG: 10 INJECTION, EMULSION INTRAVENOUS at 13:14

## 2025-07-07 RX ADMIN — PROPOFOL 125 MCG/KG/MIN: 10 INJECTION, EMULSION INTRAVENOUS at 13:15

## 2025-07-07 RX ADMIN — MELOXICAM 15 MG: 15 TABLET ORAL at 11:06

## 2025-07-07 RX ADMIN — TRANEXAMIC ACID 1000 MG: 100 INJECTION INTRAVENOUS at 14:45

## 2025-07-07 RX ADMIN — ROCURONIUM BROMIDE 20 MG: 10 INJECTION, SOLUTION INTRAVENOUS at 14:00

## 2025-07-07 RX ADMIN — ACETAMINOPHEN 1000 MG: 1000 INJECTION INTRAVENOUS at 13:30

## 2025-07-07 RX ADMIN — SODIUM CHLORIDE, POTASSIUM CHLORIDE, SODIUM LACTATE AND CALCIUM CHLORIDE 500 ML: 600; 310; 30; 20 INJECTION, SOLUTION INTRAVENOUS at 11:05

## 2025-07-07 RX ADMIN — MAGNESIUM SULFATE HEPTAHYDRATE 1 G: 500 INJECTION, SOLUTION INTRAMUSCULAR; INTRAVENOUS at 13:30

## 2025-07-07 RX ADMIN — SUGAMMADEX 200 MG: 100 INJECTION, SOLUTION INTRAVENOUS at 15:04

## 2025-07-07 RX ADMIN — SODIUM CHLORIDE, POTASSIUM CHLORIDE, SODIUM LACTATE AND CALCIUM CHLORIDE: 600; 310; 30; 20 INJECTION, SOLUTION INTRAVENOUS at 13:06

## 2025-07-07 RX ADMIN — MIDAZOLAM 0.5 MG: 1 INJECTION INTRAMUSCULAR; INTRAVENOUS at 11:40

## 2025-07-07 RX ADMIN — PROPOFOL 50 MG: 10 INJECTION, EMULSION INTRAVENOUS at 13:32

## 2025-07-07 NOTE — CASE MANAGEMENT/SOCIAL WORK
Continued Stay Note  [facility]     Patient Name: Kimi Dominguez  MRN: [id number]  Today's Date: 7/7/2025    Admit Date: (Not on file)    Plan: Home with Sabianism    Discharge Plan       Row Name 07/07/25 0933       Plan    Plan Home with Sabianism     Patient/Family in Agreement with Plan yes    Provided Post Acute Provider List? Yes    Post Acute Provider List Home Health    Delivered To Patient    Method of Delivery Telephone                   Discharge Codes    No documentation.                       Shannon Epley, RN

## 2025-07-07 NOTE — NURSING NOTE
MD notified that patient is feeling light headed post surgery with a heart rate in the 40s and blood pressure of 70's over 40's. Nurse is requesting anesthesia to come lay eyes on the patient. MD gave a verbal order for 10mg ephedrine now and he would come by to see her shortly.

## 2025-07-07 NOTE — PROGRESS NOTES
Start PACC Note    Home Health Referral    Evaluated patient and spouse Avtar on Home Care and services available. Patient offered choice of available HHC and agreeable to PT services with [facility].    Isolation Precautions: No active isolations    START PATIENT REGISTRATION INFORMATION  Order Information  Order Signing Physician: Derick Bower MD  Service Ordered RN?: No  Service Ordered PT?: Yes  Service Ordered OT?: No  Service Ordered ST?: No  Service Ordered MSW?: No  Service Ordered HHA?: No  Following Physician: Derick Bower MD  Following Physician Phone: [phone]   Overseeing Physician: Derick Bower MD  (Required for Residents Only)  Agreeable to Follow ? Yes  Date/Time of Call 07/07/25 10:30 EDT, Spoke with: Per written order in EPIC chart Dr Bower [facility] to follow and sign for home health orders.    Care Coordination  Same Day SOC?: Yes  Primary Care Physician: Janie Douglas APRN  Primary Care Physician Phone: [phone]  Primary Care Physician Address: [address] [place] [address]  Visit Instructions: N/A  Service Discharge Location Type: Home  Service Facility Name: N/A  Service Floor Facility: N/A  Service Room No: N/A    Demographics  Patient Last Name: Alberto  Patient First Name: Kimi  Language/Communication Barrier: None  Service Address: [address] [place] [address] City: [place]  Service State: [place]  Service Zip: [address]  Service Home Phone: [phone]  Other Phone Numbers:   Telephone Information:   Mobile [phone]     Emergency Contact:   Extended Emergency Contact Information  Primary Emergency Contact: Avtar Dominguez  Address: [address] [place], [place] [address] [facility]  Home Phone: [phone]  Mobile Phone: [phone]  Relation: Spouse  Secondary Emergency Contact: Anai Mcneill   [facility]  Home Phone: [phone]  Relation: Daughter    Admission Information  Admit Date: 7/7/2025  Patient Status at Discharge:  Hospital outpatient surgery  Admitting Diagnosis: Primary osteoarthritis of right hip [M16.11]    Caregiver Information  Caregiver First Name: N/A  Caregiver Last Name: N/a  Caregiver Relationship to Patient: N/A  Caregiver Phone Number: N/A  Caregiver Notes: N/A    HITECH  [employer]  HIGHTECH: HI TECH - [facility]  Procedure: Right SELENE  Date of Procedure: 07/07/2025  Precautions:   Surgeon: Dr Derick Bwoer      END PATIENT REGISTRATION INFORMATION    Start PACC Summary    Additional Comments:     Please call patient's cell phone as primary and spouse's cell phone secondary for scheduling.     Patient is agreeable to use [employer] onsite for any new DME if indicated, pending.     END PACC Summary    Discharge Date: Pending    Referral Source: [facility]    Signed By: Simi Dominguez, 7/7/2025, 10:30 EDT     Date/Time: 07/07/25 10:30 EDT    End PACC Note    Signed,  Simi Dominguez RN, BSN, BA  Post Acute Care Coordinator  [facility]  (C)[phone]  (O)[phone]

## 2025-07-07 NOTE — THERAPY EVALUATION
Patient Name: Kimi Dominguez  : 1950    MRN: [id number]                              Today's Date: 2025       Admit Date: 2025    Visit Dx:     ICD-10-CM ICD-9-CM   1. Status post total hip replacement, right  Z96.641 V43.64   2. Primary osteoarthritis of right hip  M16.11 715.15     Patient Active Problem List   Diagnosis    Hyperlipidemia    Hypertension    Obesity (BMI 30-39.9)    Primary osteoarthritis involving multiple joints    Type 2 diabetes mellitus with renal complication    Cobalamin deficiency    Lymphocytic colitis    Mixed stress and urge urinary incontinence    Female stress incontinence    Acute meniscal tear of knee    Rectocele    Urinary incontinence    Primary osteoarthritis of right hip     Past Medical History:   Diagnosis Date    Arthritis     B12 deficiency     Diabetes mellitus     Diverticulitis of colon     Female stress incontinence     Hyperlipidemia     Hypertension     Knee swelling     Loose stools     Obesity     Osteoarthritis     Rectocele 2018    Shingles     left eye    Skin cancer     Tear of meniscus of knee     Urinary incontinence      Past Surgical History:   Procedure Laterality Date    ADENOIDECTOMY      CATARACT EXTRACTION Bilateral     COLONOSCOPY      COLONOSCOPY N/A 10/04/2018    Procedure: COLONOSCOPY to Cecum WITH BIOPSY;  Surgeon: Geovanna Rebollar MD;  Location: [facility] ENDOSCOPY;  Service: General    KNEE ARTHROSCOPY Left     MOHS SURGERY      NOSE    PAP SMEAR      POSTERIOR VAGINAL REPAIR  2018    for rectocele    PUBOVAGINAL SLING N/A 2021    Procedure: pubovaginal sling cystourethroscopy;  Surgeon: Janneth Matamoros MD;  Location: [facility] OR;  Service: Gynecology;  Laterality: N/A;    SKIN CANCER DESTRUCTION      basal cells removed on nose bridge, left thigh squamous removed    SKIN GRAFT      BOTH EARS    TONSILLECTOMY      TUBAL ABDOMINAL LIGATION        General Information       Row Name 25 1765           Physical Therapy Time and Intention    Document Type evaluation  -RD     Mode of Treatment physical therapy;individual therapy  -RD       Row Name 07/07/25 1720          General Information    Patient Profile Reviewed yes  -RD     Prior Level of Function independent:;community mobility  -RD     Existing Precautions/Restrictions fall;hip, anterior  -RD     Barriers to Rehab none identified  -RD       Row Name 07/07/25 1720          Living Environment    Current Living Arrangements home  -RD     People in Home spouse  -RD       Row Name 07/07/25 1720          Home Main Entrance    Number of Stairs, Main Entrance four  -RD       Row Name 07/07/25 1720          Cognition    Orientation Status (Cognition) oriented x 4  -RD       Row Name 07/07/25 1720          Safety Issues/Impairments Affecting Functional Mobility    Impairments Affecting Function (Mobility) balance;endurance/activity tolerance;pain;range of motion (ROM);strength  -RD               User Key  (r) = Recorded By, (t) = Taken By, (c) = Cosigned By      Initials Name Provider Type    RD Thelma Benson, PT Physical Therapist                   Mobility       Row Name 07/07/25 1721          Sit-Stand Transfer    Sit-Stand [place]well (Transfers) verbal cues;standby assist  -RD     Assistive Device (Sit-Stand Transfers) walker, front-wheeled  -RD       Row Name 07/07/25 1721          Gait/Stairs (Locomotion)    Blackwell Level (Gait) verbal cues;standby assist  -RD     Assistive Device (Gait) walker, front-wheeled  -RD     Patient was able to Ambulate yes  -RD     Distance in Feet (Gait) 100  -RD     Deviations/Abnormal Patterns (Gait) stride length decreased;weight shifting decreased  -RD     Right Sided Gait Deviations decreased knee extension;heel strike decreased;weight shift ability decreased  -RD     Handrail Location (Stairs) right side (ascending)  -RD     Number of Steps (Stairs) 3  -RD     Ascending Technique (Stairs) step-to-step  -RD      Descending Technique (Stairs) step-to-step  -RD       Row Name 07/07/25 1721          Mobility    Extremity Weight-bearing Status right lower extremity  -RD     Right Lower Extremity (Weight-bearing Status) weight-bearing as tolerated (WBAT)  -RD               User Key  (r) = Recorded By, (t) = Taken By, (c) = Cosigned By      Initials Name Provider Type    Thelma Fung, PT Physical Therapist                   Obj/Interventions       Row Name 07/07/25 1722          Range of Motion Comprehensive    General Range of Motion no range of motion deficits identified  -RD     Comment, General Range of Motion followed hip prec R  -RD       Row Name 07/07/25 1722          Strength Comprehensive (MMT)    General Manual Muscle Testing (MMT) Assessment no strength deficits identified  -RD     Comment, General Manual Muscle Testing (MMT) Assessment R le 3-4/5  -RD       Row Name 07/07/25 1722          Motor Skills    Therapeutic Exercise other (see comments)  10 reps yael  -RD       Row Name 07/07/25 1722          Balance    Balance Assessment standing dynamic balance  -RD     Dynamic Standing Balance standby assist  -RD     Position/Device Used, Standing Balance walker, front-wheeled  -RD       Row Name 07/07/25 1722          Sensory Assessment (Somatosensory)    Sensory Assessment (Somatosensory) sensation intact  -RD               User Key  (r) = Recorded By, (t) = Taken By, (c) = Cosigned By      Initials Name Provider Type    Thelma Fung, PT Physical Therapist                   Goals/Plan    No documentation.                  Clinical Impression       Row Name 07/07/25 1722          Pain    Pretreatment Pain Rating 6/10  -RD     Posttreatment Pain Rating 6/10  -RD     Pain Location hip  -RD     Pain Side/Orientation right  -RD     Pain Management Interventions nursing notified;exercise or physical activity utilized  -RD     Response to Pain Interventions activity participation with tolerable pain  -RD        Row Name 07/07/25 1722          Plan of Care Review    Plan of Care Reviewed With patient;spouse;family  -RD     Progress improving  -RD     Outcome Evaluation 76 yo sp yael ant. Pt demonstrated safety and indep with gait, transfers, HEP, and stairs. Questions answered. Plan for DC to home.  -RD       Row Name 07/07/25 1722          Therapy Assessment/Plan (PT)    Patient/Family Therapy Goals Statement (PT) home  -RD     Rehab Potential (PT) good  -RD     Criteria for Skilled Interventions Met (PT) yes  -RD     Therapy Frequency (PT) evaluation only  -RD       Row Name 07/07/25 1722          Positioning and Restraints    Pre-Treatment Position sitting in chair/recliner  -RD     Post Treatment Position chair  -RD     In Chair reclined;call light within reach;encouraged to call for assist;with family/caregiver  -RD               User Key  (r) = Recorded By, (t) = Taken By, (c) = Cosigned By      Initials Name Provider Type    Thelma Fung, PT Physical Therapist                   Outcome Measures       Row Name 07/07/25 1724          How much help from another person do you currently need...    Turning from your back to your side while in flat bed without using bedrails? 3  -RD     Moving from lying on back to sitting on the side of a flat bed without bedrails? 3  -RD     Moving to and from a bed to a chair (including a wheelchair)? 3  -RD     Standing up from a chair using your arms (e.g., wheelchair, bedside chair)? 3  -RD     Climbing 3-5 steps with a railing? 3  -RD     To walk in hospital room? 3  -RD     AM-PAC 6 Clicks Score (PT) 18  -RD     Highest Level of Mobility Goal Walk 10 Steps or More-6  -RD       Row Name 07/07/25 1724          Functional Assessment    Outcome Measure Options AM-PAC 6 Clicks Basic Mobility (PT)  -RD               User Key  (r) = Recorded By, (t) = Taken By, (c) = Cosigned By      Initials Name Provider Type    Thelma Fung, PT Physical Therapist                                  Physical Therapy Education       Title: PT OT SLP Therapies (Done)       Topic: Physical Therapy (Done)       Point: Mobility training (Done)       Learning Progress Summary            Patient Acceptance, E,TB, VU by REGINE at 7/7/2025 1724                      Point: Home exercise program (Done)       Learning Progress Summary            Patient Acceptance, E,TB, VU by RD at 7/7/2025 1724                      Point: Body mechanics (Done)       Learning Progress Summary            Patient Acceptance, E,TB, VU by REGINE at 7/7/2025 1724                      Point: Precautions (Done)       Learning Progress Summary            Patient Acceptance, E,TB, VU by RD at 7/7/2025 1724                                      User Key       Initials Effective Dates Name Provider Type Discipline    RD 06/16/21 -  Thelma Benson, PT Physical Therapist PT                  PT Recommendation and Plan     Progress: improving  Outcome Evaluation: 76 yo sp yael ant. Pt demonstrated safety and indep with gait, transfers, HEP, and stairs. Questions answered. Plan for DC to home.     Time Calculation:         PT Charges       Row Name 07/07/25 1725             Time Calculation    Start Time 1700  -      Stop Time 1723  -      Time Calculation (min) 23 min  -      PT Received On 07/07/25  -         Time Calculation- PT    Total Timed Code Minutes- PT 10 minute(s)  -RD                User Key  (r) = Recorded By, (t) = Taken By, (c) = Cosigned By      Initials Name Provider Type    RD Thelma Benson, PT Physical Therapist                  Therapy Charges for Today       Code Description Service Date Service Provider Modifiers Qty    00426432066 HC PT EVAL MOD COMPLEXITY 2 7/7/2025 Thelma Benson, PT GP 1    02626760725 HC PT THER PROC EA 15 MIN 7/7/2025 Thelma Benson, PT GP 1            PT G-Codes  Outcome Measure Options: AM-PAC 6 Clicks Basic Mobility (PT)  AM-PAC 6 Clicks Score (PT): 18  PT Discharge  Summary  Anticipated Discharge Disposition (PT): home with home health    Thelma Benson, PT  7/7/2025

## 2025-07-07 NOTE — OP NOTE
Name: Kimi Dominguez  YOB: 1950    DATE OF SURGERY: 7/7/2025    PREOPERATIVE DIAGNOSIS: Right hip end-stage osteoarthritis    POSTOPERATIVE DIAGNOSIS: Right hip end-stage osteoarthritis    PROCEDURE PERFORMED: Right anterior total hip replacement    SURGEON: Derick Bower M.D.    ASSISTANT: WILLIAM LEONARDO    A surgical assistant was integral in ensuring a successful outcome with this procedure.  The assistant was utilized to assist in positioning the patient, draping the patient, was used throughout the case to provide with retraction of tissues, suctioning of blood and body fluids for visualization, positioning of the extremity to allow for proper exposure so that I could perform the procedure.  Without the use of a surgical assistant during this procedure I feel that the outcome may have been compromised or would have been suboptimal or at risk for complications.    IMPLANTS: [employer] Polar stem, R3 cup:  Implant Name Type Inv. Item Serial No.  Lot No. LRB No. Used Action   DEV CONTRL TISS STRATAFIX SPIRAL MNCRYL UD 3/0 PLS 30CM - [id number] Implant DEV CONTRL TISS STRATAFIX SPIRAL MNCRYL UD 3/0 PLS 30CM  [employer]  DIV OF J AND J [id number] Right 1 Implanted   DEV WND/CLS CONTRL TISS STRATAFIX SPIRAL PDS PLS CT1 0 30CM - [id number] Implant DEV WND/CLS CONTRL TISS STRATAFIX SPIRAL PDS PLS CT1 0 30CM  [employer]  DIV OF J AND J [id number] Right 1 Implanted   SHLL ACET R3 3H STD 50MM - [id number] Implant SHLL ACET R3 3H STD 50MM  [employer] [id number] Right 1 Implanted   LINER ACET R3 XLPE 0DEG [id number] - [id number] Implant LINER ACET R3 XLPE 0DEG [id number]  [employer]AY[employer] [id number] Right 1 Implanted   SCRW SPH HD REFLECTION 6.5X20MM - [id number] Implant SCRW SPH HD REFLECTION 6.5X20MM  [employer] [id number] Right 1 Implanted   SCRW SPH HD REFLECTION 6.5X20MM - [id number] Implant SCRW SPH HD REFLECTION 6.5X20MM  [employer]AY[employer] [id number] Right 1 Implanted   STEM FEM/HIP  "POLARSTEM W/COLR STD SZ1 - [id number] Implant STEM FEM/HIP POLARSTEM W/COLR STD SZ1  [employer] [id number] Right 1 Implanted   HD FEM/HIP OXINIUM TPR 12/14 36MM PLS0 - [id number] Implant HD FEM/HIP OXINIUM TPR 12/14 36MM PLS0  [employer]AY[employer]ES [employer] [id number] Right 1 Implanted       Estimated Blood Loss: 200cc  Specimens : none  Complications: none    DESCRIPTION OF PROCEDURE: The patient was taken to the operating room and placed in the supine position. A sequential compression device was carefully placed on the non-operative leg. Preoperative antibiotics were administered. Surgical time out was performed. After adequate induction of anesthesia, the feet were padded and placed in the Hale table boots. The patient ws then transferred onto the Hale table and positioned appropriately. C-arm image intensification was then used to take images of the pelvis and operative hip to be used for later comparison. The hip was then prepped and draped in the usual sterile fashion.   An incision was then made starting 2 cm lateral to the ASIS heading distal and lateral at approximately 30 degrees. The subcutaneous fat was then divided down to the fascia overlying the tensor fascia claritza (TFL) muscle. This was sharply divided staying a few cm lateral to the interval between the sartorius and the TFL muscle. This interval was then bluntly dissected. The circumflex vessels were then identified, cauterized, and divided. There was excellent hemostasis. Cobra retractors were then placed around the femoral neck capsule. The capsule was then divided using a \"T\" capsulotomy. The retractors were then placed intracapsular and the neck osteotomy was performed. A napkin ring neck fragment was then removed and then the head was removed using a corkscrew. There were end-stage arthritic findings.   The acetabulum was then exposed with \"number 7\" retractors. The labrum and pulvinar were excised. The starting reamer was then used to medialize the " cup and then the acetabular reaming proceeded in 2 mm increments. The cup was reamed line to line. The cup was then partially seated and c-arm was used to confirm the final cup position before final seating occurred. There was excellent position and stability of the cup.  The hip was then injected with anesthetic cocktail and the cup was anchored with 2 screws in the superior and posterior quadrants. The final liner was placed.   Attention was turned to the femur. Traction was removed from the hip and the leg was brought to the neutral position. The femoral elevator hook was placed. The leg was then moved to the external rotation, extension, and adduction position. Retractors were placed around the proximal femur and then the posterior capsule and conjoined tendon was then released. The box osteotome was then used to create the starting hole. The femur was then prepared using the rat tail broach, followed by the chili-pepper broach and then we progressively broached up the final broach which fit nicely with excellent rotational and axial stability. The hip was then reduced and c-arm images were taken which confirmed appropriate fit and position on the implants. There were no complicating factors noted, and fluoro images were taken which confirmed proper restoration of leg length and offset. The trial components were removed, the hip was copiously irrigated and the final implants were then seated. C-arm images again confirmed appropriate anatomy restoration without complicating factors noted. The final head was then placed on a clean dry taper and the hip reduced.   The hip was then copiously irrigated.  There was excellent hemostasis. We placed a one-eighth inch Hemovac drain. We closed the hip in multiple layers in standard fashion. Sterile dressings were applied. At the end of the case, the sponge and needle counts were reported as being correct. There were no known complications. The patient was then transported  to the recovery room.      Derick Bower M.D.  7/7/2025

## 2025-07-07 NOTE — ANESTHESIA POSTPROCEDURE EVALUATION
Patient: Kimi Dominguez    Procedure Summary       Date: 07/07/25 Room / Location:  [facility] [address] [place] [address] [facility]    Anesthesia Start: 1306 Anesthesia Stop: 1512    Procedure: TOTAL HIP ARTHROPLASTY ANTERIOR WITH HANA TABLE (Right: Hip) Diagnosis:       Primary osteoarthritis of right hip      (Primary osteoarthritis of right hip [M16.11])    Surgeons: Derick Bower MD Provider: Janneth Raphael MD    Anesthesia Type: general ASA Status: 3            Anesthesia Type: general    Vitals  Vitals Value Taken Time   /63 07/07/25 16:00   Temp 36.5 °C (97.7 °F) 07/07/25 16:00   Pulse 85 07/07/25 16:09   Resp 14 07/07/25 16:00   SpO2 98 % 07/07/25 16:09   Vitals shown include unfiled device data.        Post Anesthesia Care and Evaluation    Level of consciousness: awake  Pain management: adequate  Anesthetic complications: No anesthetic complications    Cardiovascular status: acceptable  Respiratory status: acceptable    Comments: /63 (BP Location: Right arm, Patient Position: Sitting)   Pulse 82   Temp 36.5 °C (97.7 °F) (Oral)   Resp 14   SpO2 100%

## 2025-07-07 NOTE — DISCHARGE INSTRUCTIONS
[facility] Surgery Nurse Navigator    You will receive a phone call from a Nurse Navigator 24 to 72 hours after your surgery.   If you have a non-urgent question, you can reach the Nurse Navigator at [phone].           NEXT DOSE OF PAIN MEDICATION CAN BE TAKEN AT 8:30 P.M.

## 2025-07-07 NOTE — ANESTHESIA PREPROCEDURE EVALUATION
Anesthesia Evaluation     no history of anesthetic complications:   NPO Solid Status: > 8 hours  NPO Liquid Status: > 2 hours           Airway   Mallampati: II  Neck ROM: full  no difficulty expected  Dental - normal exam     Pulmonary - normal exam   (+) a smoker Former,  (-) COPD, asthma, sleep apnea    PE comment: nonlabored  Cardiovascular - normal exam  Exercise tolerance: good (4-7 METS)    ECG reviewed  Rhythm: regular  Rate: normal    (+) hypertension, dysrhythmias (bundle branch block), hyperlipidemia  (-) valvular problems/murmurs, past MI, CAD, angina    ROS comment: EKG:  - ABNORMAL ECG -  Sinus rhythm  Probable  left atrial enlargement  Left bundle branch block  Prolonged QT interval  When compared with ECG of 17-Sep-2021 13:42:11,  No significant change      Neuro/Psych- negative ROS  (-) seizures, TIA, CVA  GI/Hepatic/Renal/Endo    (+) obesity, renal disease-, diabetes mellitus type 2  (-) GERD, liver disease, no thyroid disorder    Musculoskeletal     (+) arthralgias  Abdominal    Substance History      OB/GYN          Other   arthritis,   history of cancer (skin cancer)                  Anesthesia Plan    ASA 3     general   total IV anesthesia  intravenous induction     Anesthetic plan, risks, benefits, and alternatives have been provided, discussed and informed consent has been obtained with: patient.    CODE STATUS:

## 2025-07-07 NOTE — ANESTHESIA PROCEDURE NOTES
Airway  Reason: elective    Date/Time: 7/7/2025 1:22 PM  Airway not difficult    General Information and Staff    Patient location during procedure: OR  Anesthesiologist: Janneth Raphael MD  CRNA/CAA: Sushila Fraser CRNA    Indications and Patient Condition  Indications for airway management: airway protection    Preoxygenated: yes    Mask difficulty assessment: 1 - vent by mask    Final Airway Details    Final airway type: endotracheal airway      Successful airway: ETT  Cuffed: yes   Successful intubation technique: video laryngoscopy  Adjuncts used in placement: intubating stylet  Endotracheal tube insertion site: oral  Blade: CMAC  Blade size: D  ETT size (mm): 7.0  Placement verified by: chest auscultation and capnometry   Measured from: lips  ETT/EBT  to lips (cm): 20  Number of attempts at approach: 2  Assessment: lips, teeth, and gum same as pre-op and atraumatic intubation    Additional Comments  DL x1 with Mil 2, small mouth opening and airway ant, good view with CMAC, Atraumatic, MOP to cuff, BSBE, no change to dentition, secured with tape

## 2025-07-09 ENCOUNTER — TELEPHONE (OUTPATIENT)
Dept: PREOP | Facility: HOSPITAL | Age: 75
End: 2025-07-09
Payer: MEDICARE

## 2025-07-09 NOTE — TELEPHONE ENCOUNTER
Post op day 2: Phone Call     Discharge Instructions:    Ask patient about his or her discharge instructions:  Patient Confirmed Understanding    2.   What, if any, recommendations, teaching, or interventions did you provide?   Reinforced ice and elevate    Health status:    3.   Pain controlled?    Yes    4.   Recommended interventions:    No   If YES, comment Not Applicable    5.   Incision/dressing status:   Dressing intact and dry    6.   EMELY - Green light blinking?   Yes    7.   Difficulties urination?    No    8.   Last BM?  Date: 7/9/2025  If no BM by day 3-recommend OTC suppository or fleets enema: Not Applicable    Medications:    9.   Reviewed Medications with Patient/Family/Caregiver?   Yes     10. Patient taking medications as prescribed?   Yes    11. If not taking medications as prescribed, note specific medicine(s) and reason for each:    Not Applicable    Hospital Follow Up Plan:    12. Follow up Appointment with Orthopedic surgeon:   Patient confirms follow up appointment    13. Home Care ordered at discharge?  Yes         14. Home Care started, or contact made?   Yes  If no, action taken: Not Applicable    15. DME obtained/used in home?    Yes    16. Using IS?   Not Applicable    17. Other information:    Not Applicable

## 2025-07-10 DIAGNOSIS — Z96.641 STATUS POST TOTAL HIP REPLACEMENT, RIGHT: ICD-10-CM

## 2025-07-10 RX ORDER — HYDROCODONE BITARTRATE AND ACETAMINOPHEN 7.5; 325 MG/1; MG/1
1 TABLET ORAL EVERY 4 HOURS PRN
Qty: 40 TABLET | Refills: 0 | Status: SHIPPED | OUTPATIENT
Start: 2025-07-10

## 2025-07-10 NOTE — TELEPHONE ENCOUNTER
[facility] staff attempted to follow warm transfer process and was unsuccessful     Caller: [facility] PHYSICAL THERAPY [facility]/ [facility]     Relationship to patient: PROVIDER     Best call back number: [phone]    Patient is needing: NEED A VERBAL ORDER TO CONTINUE PHYSICAL THERAPY

## 2025-07-22 ENCOUNTER — OFFICE VISIT (OUTPATIENT)
Dept: ORTHOPEDIC SURGERY | Facility: CLINIC | Age: 75
End: 2025-07-22
Payer: MEDICARE

## 2025-07-22 VITALS — TEMPERATURE: 98 F | BODY MASS INDEX: 30.43 KG/M2 | HEIGHT: 61 IN | WEIGHT: 161.2 LBS

## 2025-07-22 DIAGNOSIS — R52 PAIN: Primary | ICD-10-CM

## 2025-07-22 PROCEDURE — 73502 X-RAY EXAM HIP UNI 2-3 VIEWS: CPT | Performed by: ORTHOPAEDIC SURGERY

## 2025-07-22 PROCEDURE — 1159F MED LIST DOCD IN RCRD: CPT | Performed by: ORTHOPAEDIC SURGERY

## 2025-07-22 PROCEDURE — 99024 POSTOP FOLLOW-UP VISIT: CPT | Performed by: ORTHOPAEDIC SURGERY

## 2025-07-22 PROCEDURE — 1160F RVW MEDS BY RX/DR IN RCRD: CPT | Performed by: ORTHOPAEDIC SURGERY

## 2025-07-22 NOTE — PROGRESS NOTES
Kimi Dominguez : 1950 MRN: [id number] DATE: 2025    DIAGNOSIS: 2 week follow up right total hip naterior, left knee pain    SUBJECTIVE:Patient returns today for 2 week follow up of right total hip replacement. Patient reports doing well with no unusual complaints. Appears to be progressing appropriately. Has severe left knee pain -  not responsive to injections 9gel/steroid) , NSAIDs and PT exercises    OBJECTIVE:   Exam:. The incision is healing appropriately. No sign of infection. Range of motion is progressing as expected. The calf is soft and nontender with a negative Homans sign.    Knee:  left    ALIGNMENT:     Varus  ,   Patella  tracks  midline    GAIT:    Antalgic    SKIN:    No abnormality    RANGE OF MOTION:   7  -  110   DEG    STRENGTH:   4  / 5    LIGAMENTS:    No varus / valgus instability.   Negative  Lachman.    MENISCUS:     Negative   Alejandra       DISTAL PULSES:    Paplable    DISTAL SENSATION :   Intact    LYMPHATICS:     No   lymphadenopathy    OTHER:          - Positive   effusion      - Crepitance with ROM      DIAGNOSTIC STUDIES  Xrays: 2 views of the right hip (AP pelvis and lateral right hip) were ordered and reviewed for evaluation of recent hip replacement. They demonstrate a well positioned, well aligned hip replacement without complicating factors noted. In comparison with previous films there has been interval implant placement.    Left knee xrays -  endstaeg Varus OA    ASSESSMENT: 2 week status post right hip replacement.    PLAN: 1) Staples removed and steri strips applied   2) PT exercises   3) Discontinue MICAELA hose   4) Continue ice PRN   5) WBAT   6) aspirin 81 mg orally every day for 1 month   7) Follow up in 6 weeks with repeat Xrays of right hip (2views) -at that point if the hip is sufficiently healed and knee pain is still severe and activity limiting it may be worth getting her scheduled for left knee replacement given the severe varus bone-on-bone findings and  the failure to respond to conservative measures.    Derick Bower MD  7/22/2025

## 2025-07-24 ENCOUNTER — OFFICE VISIT (OUTPATIENT)
Dept: INTERNAL MEDICINE | Age: 75
End: 2025-07-24
Payer: MEDICARE

## 2025-07-24 VITALS
RESPIRATION RATE: 17 BRPM | OXYGEN SATURATION: 97 % | HEIGHT: 61 IN | HEART RATE: 83 BPM | WEIGHT: 162 LBS | BODY MASS INDEX: 30.58 KG/M2 | DIASTOLIC BLOOD PRESSURE: 82 MMHG | TEMPERATURE: 96.4 F | SYSTOLIC BLOOD PRESSURE: 122 MMHG

## 2025-07-24 DIAGNOSIS — R71.8 OTHER ABNORMALITY OF RED BLOOD CELLS: ICD-10-CM

## 2025-07-24 DIAGNOSIS — E78.2 MIXED HYPERLIPIDEMIA: ICD-10-CM

## 2025-07-24 DIAGNOSIS — I10 PRIMARY HYPERTENSION: Chronic | ICD-10-CM

## 2025-07-24 DIAGNOSIS — Z00.00 ENCOUNTER FOR ANNUAL WELLNESS VISIT (AWV) IN MEDICARE PATIENT: Primary | ICD-10-CM

## 2025-07-24 DIAGNOSIS — R80.9 TYPE 2 DIABETES MELLITUS WITH MICROALBUMINURIA, WITHOUT LONG-TERM CURRENT USE OF INSULIN: ICD-10-CM

## 2025-07-24 DIAGNOSIS — Z12.31 ENCOUNTER FOR SCREENING MAMMOGRAM FOR MALIGNANT NEOPLASM OF BREAST: ICD-10-CM

## 2025-07-24 DIAGNOSIS — E11.29 TYPE 2 DIABETES MELLITUS WITH MICROALBUMINURIA, WITHOUT LONG-TERM CURRENT USE OF INSULIN: ICD-10-CM

## 2025-07-24 PROCEDURE — 1159F MED LIST DOCD IN RCRD: CPT

## 2025-07-24 PROCEDURE — G0439 PPPS, SUBSEQ VISIT: HCPCS

## 2025-07-24 PROCEDURE — 99214 OFFICE O/P EST MOD 30 MIN: CPT

## 2025-07-24 PROCEDURE — 1126F AMNT PAIN NOTED NONE PRSNT: CPT

## 2025-07-24 PROCEDURE — G2211 COMPLEX E/M VISIT ADD ON: HCPCS

## 2025-07-24 PROCEDURE — 3044F HG A1C LEVEL LT 7.0%: CPT

## 2025-07-24 PROCEDURE — 3079F DIAST BP 80-89 MM HG: CPT

## 2025-07-24 PROCEDURE — 1160F RVW MEDS BY RX/DR IN RCRD: CPT

## 2025-07-24 PROCEDURE — 1170F FXNL STATUS ASSESSED: CPT

## 2025-07-24 PROCEDURE — 3074F SYST BP LT 130 MM HG: CPT

## 2025-07-24 NOTE — PROGRESS NOTES
I N T E R N A L  M E D I C I JOEL E    SHADY Mireles      ENCOUNTER DATE:  07/24/2025    Kimi LAURA Alberto / 75 y.o. / female      MEDICARE ANNUAL WELLNESS VISIT       Chief Complaint:    Chief Complaint   Patient presents with    Medicare Wellness-subsequent         Patient's general assessment of her health since a year ago:     - Compared to one year ago, she feels her physical health is:   STABLE/SAME    - Compared to one year ago, she feels her mental health is:  STABLE/SAME    Recent Hospitalization (within past 365 days) (NO unless indicated)  No      Patient Care Team:    Patient Care Team:  Janie Douglas APRN as PCP - General (Family Medicine)  Jess Vo MD as Consulting Physician (Dermatology)  Latha Holloway APRN as Nurse Practitioner (Obstetrics and Gynecology)    Allergies:  Patient has no known allergies.    Medications:  Current Outpatient Medications on File Prior to Visit   Medication Sig Dispense Refill    amLODIPine (NORVASC) 10 MG tablet Take 1 tablet by mouth Daily. 90 tablet 1    aspirin 81 MG EC tablet Take 1 tablet by mouth 2 (Two) Times a Day for 14 days, THEN 1 tablet Daily for 28 days. 60 tablet 0    atorvastatin (LIPITOR) 20 MG tablet TAKE 1 TABLET BY MOUTH DAILY (Patient taking differently: Take 1 tablet by mouth Every Night.) 90 tablet 1    carvedilol (COREG) 3.125 MG tablet TAKE 1 TABLET BY MOUTH TWICE A DAY WITH A MEAL 180 tablet 0    lisinopril (PRINIVIL,ZESTRIL) 40 MG tablet TAKE 1 TABLET BY MOUTH DAILY 90 tablet 0    Melatonin 10 MG tablet Take 1 tablet by mouth At Night As Needed.      metFORMIN ER (GLUCOPHAGE-XR) 500 MG 24 hr tablet TAKE 2 TABLETS BY MOUTH TWICE A  tablet 1    pioglitazone (ACTOS) 15 MG tablet TAKE 1 TABLET BY MOUTH EVERY MORNING 90 tablet 1    Semaglutide (Rybelsus) 7 MG tablet Take 7 mg by mouth Daily. 90 tablet 1    [DISCONTINUED] HYDROcodone-acetaminophen (NORCO) 7.5-325 MG per tablet Take 1 tablet by mouth Every 4 (Four) Hours As  Needed for Moderate Pain or Severe Pain. (Patient not taking: Reported on 7/22/2025) 40 tablet 0    [DISCONTINUED] meloxicam (MOBIC) 15 MG tablet Take 1 tablet by mouth Daily. (Patient not taking: Reported on 7/22/2025) 14 tablet 0    [DISCONTINUED] ondansetron (Zofran) 4 MG tablet Take 1 tablet by mouth Every 8 (Eight) Hours As Needed for Nausea or Vomiting for up to 10 doses. 10 tablet 0     No current facility-administered medications on file prior to visit.          No opioid medication identified on active medication list. I have reviewed chart for other potential  high risk medication/s and harmful drug interactions in the elderly.       No opioid listed on medication list       HPI for other active medical problems:     Underwent right hip replacement with Dr. Bower on July 7, 2025.  Recovering well.  Following up with SHADY Da Silva on September 2, 2025.  Plan for possible left knee replacement this fall.       HTN: Remains well controlled on carvedilol 3.125 mg BID, lisinopril 40 mg daily, amlodipine 10 mg daily.       Type 2 diabetes: January 2025 Hemoglobin A1C of 6.3.  Remains on metformin 1000 mg BID, Actos 15 mg daily.  Resumed Rybelsus 7 mg daily this January.  FBS average 100s.  No episodes of hypoglycemia.  Weight is stable.  Up to date with diabetic eye exam.     HLD: Remains well controlled on atorvastatin 20 mg daily and fish oil supplementation.  January 2025 Lipid panel with triglycerides 75; LDL 54.       October 2024 Mammogram showed no evidence of malignancy.  Agreeable for order to update this fall.       March 2024 DEXA showed normal bone density.     Colonoscopy next due October 2028.      HISTORY     PFSH:     The following portions of the patient's history were reviewed and updated as appropriate: Allergies / Current Medications / Past Medical History / Surgical History / Social History / Family History    Problem List:  Patient Active Problem List   Diagnosis    Hyperlipidemia     Hypertension    Obesity (BMI 30-39.9)    Primary osteoarthritis involving multiple joints    Type 2 diabetes mellitus with renal complication    Cobalamin deficiency    Lymphocytic colitis    Mixed stress and urge urinary incontinence    Female stress incontinence    Acute meniscal tear of knee    Rectocele    Urinary incontinence    Primary osteoarthritis of right hip       Past Medical History:  Past Medical History:   Diagnosis Date    Arthritis     B12 deficiency     Diabetes mellitus     Diverticulitis of colon     Female stress incontinence     Hyperlipidemia     Hypertension     Knee swelling     Loose stools     Obesity     Osteoarthritis     Rectocele 05/02/2018    Shingles     left eye    Skin cancer     Tear of meniscus of knee     Urinary incontinence        Past Surgical History:  Past Surgical History:   Procedure Laterality Date    ADENOIDECTOMY      CATARACT EXTRACTION Bilateral     COLONOSCOPY  2008    COLONOSCOPY N/A 10/04/2018    Procedure: COLONOSCOPY to Cecum WITH BIOPSY;  Surgeon: Geovanna Rebollar MD;  Location: [facility] ENDOSCOPY;  Service: General    HIP SURGERY      KNEE ARTHROSCOPY Left     MOHS SURGERY      NOSE    PAP SMEAR      POSTERIOR VAGINAL REPAIR  05/02/2018    for rectocele    PUBOVAGINAL SLING N/A 09/21/2021    Procedure: pubovaginal sling cystourethroscopy;  Surgeon: Janneth Matamoros MD;  Location: [facility] OR;  Service: Gynecology;  Laterality: N/A;    SKIN CANCER DESTRUCTION      basal cells removed on nose bridge, left thigh squamous removed    SKIN GRAFT      BOTH EARS    TONSILLECTOMY      TOTAL HIP ARTHROPLASTY Right 07/07/2025    Procedure: TOTAL HIP ARTHROPLASTY ANTERIOR WITH HANA TABLE;  Surgeon: Derick Bower MD;  Location: [facility] OR [facility];  Service: Orthopedics;  Laterality: Right;    TUBAL ABDOMINAL LIGATION         Social History:  Social History     Socioeconomic History    Marital status:      Spouse name: Avtar Zarina)    Number of children: 3   Tobacco  "Use    Smoking status: Former     Current packs/day: 0.00     Types: Cigarettes     Quit date:      Years since quittin.5     Passive exposure: Never    Smokeless tobacco: Never   Vaping Use    Vaping status: Never Used   Substance and Sexual Activity    Alcohol use: Not Currently     Comment: RARELY    Drug use: No    Sexual activity: Not Currently     Partners: Male     Birth control/protection: None, Tubal ligation, Birth control pill       Family History:  Family History   Problem Relation Age of Onset    Stroke Mother     Arthritis Mother     Irritable bowel syndrome Mother     Coronary artery disease Father          82    Prostate cancer Father     Hypertension Father     Cancer Father     No Known Problems Sister     Diabetes Paternal Grandmother         Type 2    Breast cancer Maternal Aunt     Thyroid cancer Neg Hx     Colon cancer Neg Hx     Malig Hyperthermia Neg Hx          PATIENT ASSESSMENT     Vitals:  Vitals:    25 0730   BP: 122/82   BP Location: Left arm   Patient Position: Sitting   Cuff Size: Adult   Pulse: 83   Resp: 17   Temp: 96.4 °F (35.8 °C)   TempSrc: Temporal   SpO2: 97%   Weight: 73.5 kg (162 lb)   Height: 154.9 cm (60.98\")       BP Readings from Last 3 Encounters:   25 122/82   25 132/68   25 150/82     Wt Readings from Last 3 Encounters:   25 73.5 kg (162 lb)   25 73.1 kg (161 lb 3.2 oz)   25 73.3 kg (161 lb 8 oz)      Body mass index is 30.63 kg/m².    [unfilled]        Review of Systems:    Review of Systems   Constitutional:  Negative for chills, fever and unexpected weight change.   Respiratory:  Negative for cough, chest tightness and shortness of breath.    Cardiovascular:  Negative for chest pain, palpitations and leg swelling.   Musculoskeletal:  Positive for arthralgias (Left knee).   Neurological:  Negative for dizziness, weakness, light-headedness and headaches.   Psychiatric/Behavioral:  The patient is not nervous/anxious.  "         Physical Exam:    Physical Exam  Vitals reviewed.   Constitutional:       General: She is not in acute distress.     Appearance: Normal appearance. She is not ill-appearing, toxic-appearing or diaphoretic.   HENT:      Head: Normocephalic and atraumatic.   Cardiovascular:      Rate and Rhythm: Normal rate and regular rhythm.      Heart sounds: Normal heart sounds.   Pulmonary:      Effort: Pulmonary effort is normal.      Breath sounds: Normal breath sounds.   Feet:      Right foot:      Protective Sensation: 10 sites tested.  10 sites sensed.      Left foot:      Protective Sensation: 10 sites tested.  10 sites sensed.   Neurological:      Mental Status: She is alert and oriented to person, place, and time. Mental status is at baseline.   Psychiatric:         Mood and Affect: Mood normal.         Behavior: Behavior normal.         Thought Content: Thought content normal.         Judgment: Judgment normal.           Reviewed Data:    Labs:   Lab Results   Component Value Date     06/18/2025    K 4.1 06/18/2025    CALCIUM 9.7 06/18/2025    AST 15 01/23/2025    ALT 14 01/23/2025    BUN 9.0 06/18/2025    CREATININE 0.51 (L) 06/18/2025    CREATININE 0.67 01/23/2025    CREATININE 0.61 07/22/2024    EGFRIFNONA 114 09/17/2021    EGFRIFAFRI 116 04/06/2021       Lab Results   Component Value Date     (H) 04/27/2018    HGBA1C 6.30 (H) 01/23/2025    HGBA1C 6.40 (H) 07/22/2024    HGBA1C 6.60 (H) 01/22/2024    MICROALBUR 6.4 07/22/2024       Lab Results   Component Value Date    LDL 54 01/23/2025    LDL 50 07/22/2024    LDL 53 01/22/2024    HDL 61 (H) 01/23/2025    TRIG 75 01/23/2025    CHOLHDLRATIO 2.13 01/23/2025       Lab Results   Component Value Date    TSH 2.250 07/22/2024    FREET4 1.54 07/22/2024          Lab Results   Component Value Date    WBC 9.20 06/18/2025    HGB 12.7 06/18/2025    HGB 12.5 07/22/2024    HGB 12.5 07/18/2023     06/18/2025                 No results found for:  "\"PSA\"    Imaging:                Medical Tests:              Summary of old records / correspondence / consultant report:             Request outside records:           SCREENING ASSESSMENT      Screening for Glaucoma:  Previous screening for glaucoma?: Yes, scheduled for December 2025    Hearing Loss Screen:  Finger Rub Hearing Test (right ear): Passed  Finger Rub Hearing Test (left ear): Passed    Urinary Incontinence Screen:  Episodes of urinary incontinence? : Yes, stable, non worsening, pads    Depression Screen:      7/24/2025     7:38 AM   PHQ-2/PHQ-9 Depression Screening   Little interest or pleasure in doing things Not at all   Feeling down, depressed, or hopeless Not at all   How difficult have these problems made it for you to do your work, take care of things at home, or get along with other people? Not difficult at all        PHQ-2: 0 (Not depressed)    PHQ-9: 0 (Negative screening for depression)         FUNCTIONAL, FALL RISK, & COGNITIVE SCREENING (components below):     A) Functional and cognitive status based on patient responses:        7/24/2025     7:35 AM   Functional & Cognitive Status   Do you have difficulty preparing food and eating? No   Do you have difficulty bathing yourself, getting dressed or grooming yourself? No   Do you have difficulty using the toilet? No   Do you have difficulty moving around from place to place? No   Do you have trouble with steps or getting out of a bed or a chair? No   Current Diet Low Carb Diet   Dental Exam Up to date   Eye Exam Up to date   Exercise (times per week) 0 times per week   Current Exercises Include Walking   Do you need help using the phone?  No   Are you deaf or do you have serious difficulty hearing?  No   Do you need help to go to places out of walking distance? No   Do you need help shopping? No   Do you need help preparing meals?  No   Do you need help with housework?  No   Do you need help with laundry? No   Do you need help taking your " medications? No   Do you need help managing money? No   Do you ever drive or ride in a car without wearing a seat belt? No   Have you felt unusual fatigue (could be tiredness), stress, anger or loneliness in the last month? No   Who do you live with? Spouse   If you need help, do you have trouble finding someone available to you? No   Have you been bothered in the last four weeks by sexual problems? No   Do you have difficulty concentrating, remembering or making decisions? No       B) Assessment of Fall Risk:    Fall Risk Assessment was completed, and patient is at low risk for falls.    Need for further evaluation of gait, strength, and balance? : NO    Timed Up and Go (TUG): 10 seconds   (>= 12 seconds indicates high risk for falling)    Observable abnormalities included:   Normal balance and gait pattern    C. Assessment of Cognitive Function:    Mini-Cog Test:     1) Registration (3 objects): YES   2) Clock Draw: Passed? : Yes   3) Number of objects recalled: 3 (MA)     Further evaluation required? : No    **OVERALL ASSESSMENT OF FUNCTIONAL ABILITY**  (Assessment of ability to perform ADL's (showering/bathing, using toilet, dressing, feeding self, moving self around) and IADL's (use telephone, shop, prepare food, housekeep, do laundry, transport independently, take medications independently, and handle finances)    DEGREE OF FUNCTIONAL IMPAIRMENT:   NONE (based on assessment noted above)    ABILITY TO LIVE INDEPENDENTLY:    Capable of living with spouse/partner/family       COUNSELING       A. Identification of Health Risk Factors:    Risk factors include: cardiovascular risk factors      B. Age-Appropriate Screening Schedule:  (Refer to the list below for future screening recommendations based on patient's age, sex and/or medical conditions. Orders for these recommended tests are listed in the plan section. The patient has been provided with a written plan)    Health Maintenance Topics  Health Maintenance    Topic Date Due    URINE MICROALBUMIN-CREATININE RATIO (uACR)  07/22/2025    HEMOGLOBIN A1C  07/23/2025    COVID-19 Vaccine (1 - 2024-25 season) 01/24/2026 (Originally 9/1/2024)    INFLUENZA VACCINE  10/01/2025    DIABETIC EYE EXAM  12/10/2025    LIPID PANEL  01/23/2026    DXA SCAN  03/11/2026    ANNUAL WELLNESS VISIT  07/24/2026    DIABETIC FOOT EXAM  07/24/2026    MAMMOGRAM  10/08/2026    COLORECTAL CANCER SCREENING  10/04/2028    TDAP/TD VACCINES (3 - Td or Tdap) 07/22/2034    HEPATITIS C SCREENING  Completed    Pneumococcal Vaccine 50+  Completed    ZOSTER VACCINE  Addressed       Health Maintenance Topics Due or Over-Due  Health Maintenance Due   Topic Date Due    URINE MICROALBUMIN-CREATININE RATIO (uACR)  07/22/2025    HEMOGLOBIN A1C  07/23/2025         C. Advanced Care Planning:    Advance Care Planning   ACP discussion was held with the patient during this visit. Patient has an advance directive in EMR which is still valid.        D. Patient Self-Management and Personalized Health Advice:    She has been provided with PERSONALIZED COUNSELING/INFORMATION (AVS educational information) about:     -- reducing risk for cardiovascular disease (heart, stroke, vascular)      She has been recommended for the following PREVENTATIVE SERVICES which has been performed today, will be ordered today or ordered/performed on upcoming follow-up visit:     LIFESTYLE PREVENTATIVE MEASURES   EYE exam for DIABETES recommended annually     CARDIOVASCULAR SCREENING   N/A / Current    CANCER SCREENING   COLORECTAL cancer: Colonoscopy/Cologuard discussed   BREAST CANCER screening discussed and mammogram every 1-2 years recommended    MISC SCREENING  OSTEOPOROSIS screening DISCUSSED  DIABETES screening performed (current/reviewed labs/lab ordered)    VACCINATION/IMMUNIZATION   COVID-19 vaccination discussed/recommended      E. Miscellaneous Items: [id number]    Aspirin use counseling: Taking ASA appropriately as indicated (following  surgical procedure)    Discussed BMI with her. The BMI is above average; BMI management plan is completed (discussed plans for weight loss)    Reviewed use of high risk medication in the elderly: YES    Reviewed for potential of harmful drug interactions in the elderly: YES        WRAP UP       Assessment & Plan:    1) MEDICARE ANNUAL WELLNESS VISIT    2) OTHER MEDICAL CONDITIONS ADDRESSED TODAY:            Problem List Items Addressed This Visit       Hyperlipidemia (Chronic)    Overview   Continue atorvastatin 20 mg daily.         Relevant Medications    atorvastatin (LIPITOR) 20 MG tablet    Other Relevant Orders    Comprehensive Metabolic Panel    Lipid Panel With / Chol / HDL Ratio    Hypertension (Chronic)    Overview   Continue lisinopril 40 mg, amlodipine 10 mg, carvedilol 3.125 mg BID.          Relevant Medications    amLODIPine (NORVASC) 10 MG tablet    lisinopril (PRINIVIL,ZESTRIL) 40 MG tablet    carvedilol (COREG) 3.125 MG tablet    Other Relevant Orders    CBC & Differential    Comprehensive Metabolic Panel    TSH+Free T4    Urinalysis With Microscopic If Indicated (No Culture) - Urine, Clean Catch    Microalbumin / Creatinine Urine Ratio - Urine, Clean Catch    Ferritin     Other Visit Diagnoses         Encounter for annual wellness visit (AWV) in Medicare patient    -  Primary      Type 2 diabetes mellitus with microalbuminuria, without long-term current use of insulin        Relevant Orders    Comprehensive Metabolic Panel    Hemoglobin A1c    Microalbumin / Creatinine Urine Ratio - Urine, Clean Catch      Other abnormality of red blood cells        Relevant Orders    Ferritin      Encounter for screening mammogram for malignant neoplasm of breast        Relevant Orders    Mammo screening digital tomosynthesis bilateral w CAD                      Orders Placed This Encounter   Procedures    Mammo screening digital tomosynthesis bilateral w CAD    Comprehensive Metabolic Panel    Hemoglobin A1c     Lipid Panel With / Chol / HDL Ratio    TSH+Free T4    Urinalysis With Microscopic If Indicated (No Culture) - Urine, Clean Catch    Microalbumin / Creatinine Urine Ratio - Urine, Clean Catch    Ferritin    CBC & Differential       Discussion / Summary:    Continue healthy, low calorie diet and regular exercise as tolerated.    Follow up with ortho as scheduled.    Update mammogram this fall 2025.    Medications as of TODAY:              Current Outpatient Medications   Medication Sig Dispense Refill    amLODIPine (NORVASC) 10 MG tablet Take 1 tablet by mouth Daily. 90 tablet 1    aspirin 81 MG EC tablet Take 1 tablet by mouth 2 (Two) Times a Day for 14 days, THEN 1 tablet Daily for 28 days. 60 tablet 0    atorvastatin (LIPITOR) 20 MG tablet TAKE 1 TABLET BY MOUTH DAILY (Patient taking differently: Take 1 tablet by mouth Every Night.) 90 tablet 1    carvedilol (COREG) 3.125 MG tablet TAKE 1 TABLET BY MOUTH TWICE A DAY WITH A MEAL 180 tablet 0    lisinopril (PRINIVIL,ZESTRIL) 40 MG tablet TAKE 1 TABLET BY MOUTH DAILY 90 tablet 0    Melatonin 10 MG tablet Take 1 tablet by mouth At Night As Needed.      metFORMIN ER (GLUCOPHAGE-XR) 500 MG 24 hr tablet TAKE 2 TABLETS BY MOUTH TWICE A  tablet 1    pioglitazone (ACTOS) 15 MG tablet TAKE 1 TABLET BY MOUTH EVERY MORNING 90 tablet 1    Semaglutide (Rybelsus) 7 MG tablet Take 7 mg by mouth Daily. 90 tablet 1     No current facility-administered medications for this visit.         FOLLOW-UP:            Return for 6 month chronic care, 1 year AWV.              Future Appointments   Date Time Provider Department Center   9/2/2025  8:00 AM Danis Jacobs APRN [facility] [address] [place]   1/26/2026  9:00 AM Janie Douglas APRN [facility]  [place]   7/31/2026  7:30 AM Janie Douglas APRN [facility]  [place]           After Visit Summary (AVS) including the Personalized Prevention  Plan Services (PPPS) was either printed and given to the patient at check-out today and/or sent to [facility] for  review.

## 2025-07-25 DIAGNOSIS — E11.9 TYPE 2 DIABETES MELLITUS WITHOUT COMPLICATION, WITHOUT LONG-TERM CURRENT USE OF INSULIN: Chronic | ICD-10-CM

## 2025-07-25 LAB
ALBUMIN SERPL-MCNC: 4.1 G/DL (ref 3.5–5.2)
ALBUMIN/CREAT UR: <9 MG/G CREAT (ref 0–29)
ALBUMIN/GLOB SERPL: 1.5 G/DL
ALP SERPL-CCNC: 97 U/L (ref 39–117)
ALT SERPL-CCNC: 7 U/L (ref 1–33)
APPEARANCE UR: CLEAR
AST SERPL-CCNC: 10 U/L (ref 1–32)
BACTERIA #/AREA URNS HPF: NORMAL /HPF
BASOPHILS # BLD AUTO: 0.05 10*3/MM3 (ref 0–0.2)
BASOPHILS NFR BLD AUTO: 0.6 % (ref 0–1.5)
BILIRUB SERPL-MCNC: 0.4 MG/DL (ref 0–1.2)
BILIRUB UR QL STRIP: NEGATIVE
BUN SERPL-MCNC: 9 MG/DL (ref 8–23)
BUN/CREAT SERPL: 17 (ref 7–25)
CALCIUM SERPL-MCNC: 10 MG/DL (ref 8.6–10.5)
CASTS URNS MICRO: NORMAL
CHLORIDE SERPL-SCNC: 101 MMOL/L (ref 98–107)
CHOLEST SERPL-MCNC: 122 MG/DL (ref 0–200)
CHOLEST/HDLC SERPL: 2.26 {RATIO}
CO2 SERPL-SCNC: 27 MMOL/L (ref 22–29)
COLOR UR: YELLOW
CREAT SERPL-MCNC: 0.53 MG/DL (ref 0.57–1)
CREAT UR-MCNC: 34.4 MG/DL
EGFRCR SERPLBLD CKD-EPI 2021: 97.2 ML/MIN/1.73
EOSINOPHIL # BLD AUTO: 0.09 10*3/MM3 (ref 0–0.4)
EOSINOPHIL NFR BLD AUTO: 1.1 % (ref 0.3–6.2)
EPI CELLS #/AREA URNS HPF: NORMAL /HPF
ERYTHROCYTE [DISTWIDTH] IN BLOOD BY AUTOMATED COUNT: 13.3 % (ref 12.3–15.4)
FERRITIN SERPL-MCNC: 31.8 NG/ML (ref 13–150)
GLOBULIN SER CALC-MCNC: 2.7 GM/DL
GLUCOSE SERPL-MCNC: 104 MG/DL (ref 65–99)
GLUCOSE UR QL STRIP: NEGATIVE
HBA1C MFR BLD: 5.9 % (ref 4.8–5.6)
HCT VFR BLD AUTO: 35.2 % (ref 34–46.6)
HDLC SERPL-MCNC: 54 MG/DL (ref 40–60)
HGB BLD-MCNC: 10.9 G/DL (ref 12–15.9)
HGB UR QL STRIP: NEGATIVE
IMM GRANULOCYTES # BLD AUTO: 0.02 10*3/MM3 (ref 0–0.05)
IMM GRANULOCYTES NFR BLD AUTO: 0.2 % (ref 0–0.5)
KETONES UR QL STRIP: NEGATIVE
LDLC SERPL CALC-MCNC: 51 MG/DL (ref 0–100)
LEUKOCYTE ESTERASE UR QL STRIP: ABNORMAL
LYMPHOCYTES # BLD AUTO: 1.65 10*3/MM3 (ref 0.7–3.1)
LYMPHOCYTES NFR BLD AUTO: 20.3 % (ref 19.6–45.3)
MCH RBC QN AUTO: 27.7 PG (ref 26.6–33)
MCHC RBC AUTO-ENTMCNC: 31 G/DL (ref 31.5–35.7)
MCV RBC AUTO: 89.3 FL (ref 79–97)
MICROALBUMIN UR-MCNC: <3 UG/ML
MONOCYTES # BLD AUTO: 0.66 10*3/MM3 (ref 0.1–0.9)
MONOCYTES NFR BLD AUTO: 8.1 % (ref 5–12)
NEUTROPHILS # BLD AUTO: 5.65 10*3/MM3 (ref 1.7–7)
NEUTROPHILS NFR BLD AUTO: 69.7 % (ref 42.7–76)
NITRITE UR QL STRIP: NEGATIVE
NRBC BLD AUTO-RTO: 0 /100 WBC (ref 0–0.2)
PH UR STRIP: 7.5 [PH] (ref 5–8)
PLATELET # BLD AUTO: 581 10*3/MM3 (ref 140–450)
POTASSIUM SERPL-SCNC: 4.3 MMOL/L (ref 3.5–5.2)
PROT SERPL-MCNC: 6.8 G/DL (ref 6–8.5)
PROT UR QL STRIP: NEGATIVE
RBC # BLD AUTO: 3.94 10*6/MM3 (ref 3.77–5.28)
RBC #/AREA URNS HPF: NORMAL /HPF
SODIUM SERPL-SCNC: 141 MMOL/L (ref 136–145)
SP GR UR STRIP: 1.01 (ref 1–1.03)
T4 FREE SERPL-MCNC: 1.48 NG/DL (ref 0.92–1.68)
TRIGL SERPL-MCNC: 89 MG/DL (ref 0–150)
TSH SERPL DL<=0.005 MIU/L-ACNC: 1.2 UIU/ML (ref 0.27–4.2)
UROBILINOGEN UR STRIP-MCNC: ABNORMAL MG/DL
VLDLC SERPL CALC-MCNC: 17 MG/DL (ref 5–40)
WBC # BLD AUTO: 8.12 10*3/MM3 (ref 3.4–10.8)
WBC #/AREA URNS HPF: NORMAL /HPF

## 2025-07-25 RX ORDER — METFORMIN HYDROCHLORIDE 500 MG/1
500 TABLET, EXTENDED RELEASE ORAL 2 TIMES DAILY
Qty: 180 TABLET | Refills: 0 | Status: SHIPPED | OUTPATIENT
Start: 2025-07-25 | End: 2025-10-23

## 2025-07-28 DIAGNOSIS — E11.9 TYPE 2 DIABETES MELLITUS WITHOUT COMPLICATION, WITHOUT LONG-TERM CURRENT USE OF INSULIN: Chronic | ICD-10-CM

## 2025-07-28 RX ORDER — METFORMIN HYDROCHLORIDE 500 MG/1
1000 TABLET, EXTENDED RELEASE ORAL 2 TIMES DAILY
Qty: 360 TABLET | Refills: 1 | OUTPATIENT
Start: 2025-07-28

## 2025-08-01 DIAGNOSIS — D50.9 IRON DEFICIENCY ANEMIA, UNSPECIFIED IRON DEFICIENCY ANEMIA TYPE: ICD-10-CM

## 2025-08-04 DIAGNOSIS — E78.2 MIXED HYPERLIPIDEMIA: Primary | Chronic | ICD-10-CM

## 2025-08-05 RX ORDER — ATORVASTATIN CALCIUM 20 MG/1
20 TABLET, FILM COATED ORAL NIGHTLY
Qty: 90 TABLET | Refills: 0 | Status: SHIPPED | OUTPATIENT
Start: 2025-08-05

## 2025-08-14 DIAGNOSIS — D50.9 IRON DEFICIENCY ANEMIA, UNSPECIFIED IRON DEFICIENCY ANEMIA TYPE: Primary | ICD-10-CM

## 2025-08-29 LAB
BASOPHILS # BLD AUTO: 0.04 10*3/MM3 (ref 0–0.2)
BASOPHILS NFR BLD AUTO: 0.7 % (ref 0–1.5)
EOSINOPHIL # BLD AUTO: 0.07 10*3/MM3 (ref 0–0.4)
EOSINOPHIL NFR BLD AUTO: 1.2 % (ref 0.3–6.2)
ERYTHROCYTE [DISTWIDTH] IN BLOOD BY AUTOMATED COUNT: 13.3 % (ref 12.3–15.4)
FERRITIN SERPL-MCNC: 19.6 NG/ML (ref 13–150)
HCT VFR BLD AUTO: 36.3 % (ref 34–46.6)
HGB BLD-MCNC: 11.3 G/DL (ref 12–15.9)
IMM GRANULOCYTES # BLD AUTO: 0.01 10*3/MM3 (ref 0–0.05)
IMM GRANULOCYTES NFR BLD AUTO: 0.2 % (ref 0–0.5)
LYMPHOCYTES # BLD AUTO: 1.51 10*3/MM3 (ref 0.7–3.1)
LYMPHOCYTES NFR BLD AUTO: 26 % (ref 19.6–45.3)
MCH RBC QN AUTO: 27 PG (ref 26.6–33)
MCHC RBC AUTO-ENTMCNC: 31.1 G/DL (ref 31.5–35.7)
MCV RBC AUTO: 86.8 FL (ref 79–97)
MONOCYTES # BLD AUTO: 0.49 10*3/MM3 (ref 0.1–0.9)
MONOCYTES NFR BLD AUTO: 8.4 % (ref 5–12)
NEUTROPHILS # BLD AUTO: 3.68 10*3/MM3 (ref 1.7–7)
NEUTROPHILS NFR BLD AUTO: 63.5 % (ref 42.7–76)
NRBC BLD AUTO-RTO: 0 /100 WBC (ref 0–0.2)
PLATELET # BLD AUTO: 375 10*3/MM3 (ref 140–450)
RBC # BLD AUTO: 4.18 10*6/MM3 (ref 3.77–5.28)
WBC # BLD AUTO: 5.8 10*3/MM3 (ref 3.4–10.8)

## (undated) DEVICE — NEEDLE, QUINCKE, 18GX3.5": Brand: MEDLINE

## (undated) DEVICE — THE TORRENT IRRIGATION SCOPE CONNECTOR IS USED WITH THE TORRENT IRRIGATION TUBING TO PROVIDE IRRIGATION FLUIDS SUCH AS STERILE WATER DURING GASTROINTESTINAL ENDOSCOPIC PROCEDURES WHEN USED IN CONJUNCTION WITH AN IRRIGATION PUMP (OR ELECTROSURGICAL UNIT).: Brand: TORRENT

## (undated) DEVICE — PREP SOL POVIDONE/IODINE BT 4OZ

## (undated) DEVICE — PATIENT RETURN ELECTRODE, SINGLE-USE, CONTACT QUALITY MONITORING, ADULT, WITH 9FT CORD, FOR PATIENTS WEIGING OVER 33LBS. (15KG): Brand: MEGADYNE

## (undated) DEVICE — UNDERGLV SURG BIOGEL INDICATOR LF PF 7.5

## (undated) DEVICE — LEGGINGS, PAIR, 31X48, STERILE: Brand: MEDLINE

## (undated) DEVICE — ANTIBACTERIAL UNDYED BRAIDED (POLYGLACTIN 910), SYNTHETIC ABSORBABLE SUTURE: Brand: COATED VICRYL

## (undated) DEVICE — SUT PDS 1 CT1 36IN Z347H

## (undated) DEVICE — TUBING, SUCTION, 1/4" X 10', STRAIGHT: Brand: MEDLINE

## (undated) DEVICE — ST IRR CYSTO W/SPK 77IN LF

## (undated) DEVICE — GLV SURG SENSICARE PI PF LF 7 GRN STRL

## (undated) DEVICE — DRP Z/FRICTION 10X16IN

## (undated) DEVICE — SUT MNCRYL 4/0 SH 27IN Y415H

## (undated) DEVICE — 450 ML BOTTLE OF 0.05% CHLORHEXIDINE GLUCONATE IN 99.95% STERILE WATER FOR IRRIGATION, USP AND APPLICATOR.: Brand: IRRISEPT ANTIMICROBIAL WOUND LAVAGE

## (undated) DEVICE — SYR LL TP 10ML STRL

## (undated) DEVICE — MAT FLR ABS LIQUILOC 28X56IN PNK

## (undated) DEVICE — SYR CONTRL LUERLOK 10CC

## (undated) DEVICE — DRAPE,UNDERBUTTOCKS,PCH,STERILE: Brand: MEDLINE

## (undated) DEVICE — DUAL CUT SAGITTAL BLADE

## (undated) DEVICE — PENCL SMOKE/EVAC MEGADYNE TELESCP 10FT

## (undated) DEVICE — SYS SKIN EXOFIN WND CLS 4X22CM

## (undated) DEVICE — PK ANT HIP 40

## (undated) DEVICE — REFLECTION FLEXIBLE DRILL 25MM: Brand: REFLECTION

## (undated) DEVICE — SINGLE-USE BIOPSY FORCEPS: Brand: RADIAL JAW 4

## (undated) DEVICE — TRAP FLD MINIVAC MEGADYNE 100ML

## (undated) DEVICE — TOWEL,OR,DSP,ST,BLUE,STD,4/PK,20PK/CS: Brand: MEDLINE

## (undated) DEVICE — 3M™ IOBAN™ 2 ANTIMICROBIAL INCISE DRAPE 6640EZ: Brand: IOBAN™ 2

## (undated) DEVICE — SUT VIC 0 TIES 18IN J912G

## (undated) DEVICE — DECANTER BAG 9": Brand: MEDLINE INDUSTRIES, INC.

## (undated) DEVICE — 3M™ STERI-DRAPE™ INSTRUMENT POUCH 1018: Brand: STERI-DRAPE™

## (undated) DEVICE — GLV SURG SENSICARE W/ALOE PF LF 8 STRL

## (undated) DEVICE — SPONGE,LAP,18"X18",DLX,XR,ST,5/PK,40/PK: Brand: MEDLINE

## (undated) DEVICE — SUT VIC PLS UD BR COAT ANTIB ABS CT1 SZ1 36MM 27IN VCPP40D

## (undated) DEVICE — THE STERILE LIGHT HANDLE COVER IS USED WITH STERIS SURGICAL LIGHTING AND VISUALIZATION SYSTEMS.

## (undated) DEVICE — GLV SURG SENSICARE W/ALOE PF LF 7.5 STRL

## (undated) DEVICE — APPL DURAPREP IODOPHOR APL 26ML

## (undated) DEVICE — ADAPTER,CATHETER/SYRINGE/LUER,STERILE: Brand: MEDLINE

## (undated) DEVICE — PK HYST VAG 40

## (undated) DEVICE — PENCL E/S ULTRAVAC TELESCP NOSE HOLSTR 10FT

## (undated) DEVICE — PUNCH BIOP 2MM

## (undated) DEVICE — GLV SURG SENSICARE PI MIC PF SZ7 LF STRL

## (undated) DEVICE — WEREWOLF FASTSEAL 6.0 HEMOSTASIS WAND: Brand: FASTSEAL 6.0 HEMOSTASIS WAND

## (undated) DEVICE — NDL HYPO PRECISIONGLIDE REG 25G 1 1/2

## (undated) DEVICE — GLV SURG BIOGEL LTX PF 6 1/2

## (undated) DEVICE — ADHS SKIN SURG TISS VISC PREMIERPRO EXOFIN HI/VISC FAST/DRY

## (undated) DEVICE — DRAPE,REIN 53X77,STERILE: Brand: MEDLINE

## (undated) DEVICE — STPLR SKIN VISISTAT WD 35CT

## (undated) DEVICE — GLV SURG BIOGEL M LTX PF 7 1/2

## (undated) DEVICE — NEEDLE, QUINCKE 22GX3.5": Brand: MEDLINE INDUSTRIES, INC.

## (undated) DEVICE — 3M™ STERI-STRIP™ REINFORCED ADHESIVE SKIN CLOSURES, R1547, 1/2 IN X 4 IN (12 MM X 100 MM), 6 STRIPS/ENVELOPE: Brand: 3M™ STERI-STRIP™

## (undated) DEVICE — Device: Brand: DEFENDO AIR/WATER/SUCTION AND BIOPSY VALVE

## (undated) DEVICE — SKIN PREP TRAY 4 COMPARTM TRAY: Brand: MEDLINE INDUSTRIES, INC.

## (undated) DEVICE — GOWN ,SIRUS,NONREINFORCED SMALL: Brand: MEDLINE

## (undated) DEVICE — TUBING, SUCTION, 1/4" X 20', STRAIGHT: Brand: MEDLINE INDUSTRIES, INC.

## (undated) DEVICE — CANNULA,ADULT,SOFT-TOUCH,7'TUBE,UC: Brand: PENDING